# Patient Record
Sex: FEMALE | Race: WHITE | NOT HISPANIC OR LATINO | Employment: FULL TIME | URBAN - METROPOLITAN AREA
[De-identification: names, ages, dates, MRNs, and addresses within clinical notes are randomized per-mention and may not be internally consistent; named-entity substitution may affect disease eponyms.]

---

## 2017-04-17 ENCOUNTER — OFFICE VISIT (OUTPATIENT)
Dept: OTOLARYNGOLOGY | Facility: CLINIC | Age: 57
End: 2017-04-17
Payer: COMMERCIAL

## 2017-04-17 ENCOUNTER — OFFICE VISIT (OUTPATIENT)
Dept: AUDIOLOGY | Facility: CLINIC | Age: 57
End: 2017-04-17
Payer: COMMERCIAL

## 2017-04-17 VITALS — BODY MASS INDEX: 34.71 KG/M2 | RESPIRATION RATE: 18 BRPM | HEIGHT: 62 IN | WEIGHT: 188.6 LBS

## 2017-04-17 DIAGNOSIS — H90.2 CONDUCTIVE HEARING LOSS, UNILATERAL: ICD-10-CM

## 2017-04-17 DIAGNOSIS — H69.91 DYSFUNCTION OF EUSTACHIAN TUBE, RIGHT: ICD-10-CM

## 2017-04-17 DIAGNOSIS — H93.13 BILATERAL TINNITUS: ICD-10-CM

## 2017-04-17 DIAGNOSIS — Z98.890 HISTORY OF TYMPANOPLASTY OF RIGHT EAR: ICD-10-CM

## 2017-04-17 DIAGNOSIS — H93.13 TINNITUS, BILATERAL: Primary | ICD-10-CM

## 2017-04-17 DIAGNOSIS — H90.5 SNHL (SENSORINEURAL HEARING LOSS): ICD-10-CM

## 2017-04-17 PROBLEM — E78.00 HYPERCHOLESTEREMIA: Status: ACTIVE | Noted: 2017-04-17

## 2017-04-17 PROBLEM — M79.7 FIBROMYALGIA: Status: ACTIVE | Noted: 2017-04-17

## 2017-04-17 PROBLEM — I10 BENIGN ESSENTIAL HYPERTENSION: Status: ACTIVE | Noted: 2017-04-17

## 2017-04-17 PROCEDURE — 92567 TYMPANOMETRY: CPT | Performed by: AUDIOLOGIST

## 2017-04-17 PROCEDURE — 92557 COMPREHENSIVE HEARING TEST: CPT | Performed by: AUDIOLOGIST

## 2017-04-17 PROCEDURE — 99203 OFFICE O/P NEW LOW 30 MIN: CPT | Performed by: OTOLARYNGOLOGY

## 2017-04-17 RX ORDER — LISINOPRIL 40 MG/1
40 TABLET ORAL DAILY
COMMUNITY
End: 2017-12-09

## 2017-04-17 RX ORDER — MELOXICAM 15 MG/1
15 TABLET ORAL DAILY
COMMUNITY
End: 2017-12-09

## 2017-04-17 RX ORDER — TURMERIC 100 %
1 POWDER (GRAM) MISCELLANEOUS EVERY EVENING
COMMUNITY

## 2017-04-17 RX ORDER — IMIPRAMINE HCL 50 MG
150 TABLET ORAL AT BEDTIME
COMMUNITY
End: 2019-07-08

## 2017-04-17 RX ORDER — LEVOTHYROXINE SODIUM 112 UG/1
112 TABLET ORAL EVERY EVENING
COMMUNITY
End: 2019-07-03

## 2017-04-17 RX ORDER — PRAVASTATIN SODIUM 20 MG
20 TABLET ORAL DAILY
COMMUNITY
End: 2019-06-11 | Stop reason: DRUGHIGH

## 2017-04-17 RX ORDER — CETIRIZINE HYDROCHLORIDE 10 MG/1
10 TABLET ORAL DAILY
COMMUNITY
End: 2017-12-09

## 2017-04-17 RX ORDER — PREGABALIN 100 MG/1
100 CAPSULE ORAL 3 TIMES DAILY
COMMUNITY

## 2017-04-17 ASSESSMENT — PAIN SCALES - GENERAL: PAINLEVEL: NO PAIN (0)

## 2017-04-17 NOTE — MR AVS SNAPSHOT
"              After Visit Summary   2017    Britni Tavarez    MRN: [id number]           Patient Information     Date Of Birth          1960        Visit Information        Provider Department      2017 1:00 PM Alexa Orosco, [facility] [place]        Today's Diagnoses     Bilateral tinnitus        Dysfunction of eustachian tube, right        Conductive hearing loss, unilateral           Follow-ups after your visit        Who to contact     If you have questions or need follow up information about today's clinic visit or your schedule please contact [facility] directly at [phone].  Normal or non-critical lab and imaging results will be communicated to you by MyChart, letter or phone within 4 business days after the clinic has received the results. If you do not hear from us within 7 days, please contact the clinic through [employer] or phone. If you have a critical or abnormal lab result, we will notify you by phone as soon as possible.  Submit refill requests through [employer] or call your pharmacy and they will forward the refill request to us. Please allow 3 business days for your refill to be completed.          Additional Information About Your Visit        MyChart Information     [employer] lets you send messages to your doctor, view your test results, renew your prescriptions, schedule appointments and more. To sign up, go to [email or web address] . Click on \"Log in\" on the left side of the screen, which will take you to the Welcome page. Then click on \"Sign up Now\" on the right side of the page.     You will be asked to enter the access code listed below, as well as some personal information. Please follow the directions to create your username and password.     Your access code is: [email or web address]  Expires: 2017  1:54 PM     Your access code will  in 90 days. If you need help or a new code, please call your [facility] or [phone].      "   Care EveryWhere ID     This is your Care EveryWhere ID. This could be used by other organizations to access your [place] medical records  [id number]         Blood Pressure from Last 3 Encounters:   No data found for BP    Weight from Last 3 Encounters:   04/17/17 188 lb 9.6 oz (85.5 kg)              We Performed the Following     AUDIOGRAM/TYMPANOGRAM - INTERFACE     COMPREHENSIVE HEARING TEST     TYMPANOMETRY        Primary Care Provider Office Phone #    [facility] [phone]       No address on file        Thank you!     Thank you for choosing [facility]  for your care. Our goal is always to provide you with excellent care. Hearing back from our patients is one way we can continue to improve our services. Please take a few minutes to complete the written survey that you may receive in the mail after your visit with us. Thank you!             Your Updated Medication List - Protect others around you: Learn how to safely use, store and throw away your medicines at www.disposemymeds.org.          This list is accurate as of: 4/17/17  5:27 PM.  Always use your most recent med list.                   Brand Name Dispense Instructions for use    aspirin 81 MG tablet      Take by mouth daily       cetirizine 10 MG tablet    zyrTEC     Take 10 mg by mouth daily       cholecalciferol 5000 UNITS Caps capsule    vitamin D3     Take by mouth daily       Diclofenac Sodium & Capsaicin 1.5 & 0.025 % Thpk          imipramine 50 MG tablet    TOFRANIL     Take 50 mg by mouth At Bedtime       levothyroxine 112 MCG tablet    SYNTHROID/LEVOTHROID     Take 112 mcg by mouth daily       lisinopril 40 MG tablet    PRINIVIL/ZESTRIL     Take 40 mg by mouth daily       meloxicam 15 MG tablet    MOBIC     Take 15 mg by mouth daily       MULTIVITAMIN ADULT PO          pravastatin 20 MG tablet    PRAVACHOL     Take by mouth daily       pregabalin 100 MG capsule    LYRICA     Take by mouth 3 times daily        tiZANidine 4 MG tablet    ZANAFLEX     Take by mouth 3 times daily       Turmeric Root Powd

## 2017-04-17 NOTE — PATIENT INSTRUCTIONS
Scheduling Information  To schedule your CT/MRI scan, please contact [facility] at [phone] OR [facility] at [phone]    To schedule your Surgery, please contact our Specialty Schedulers at [phone]      ENT Clinic Locations Clinic Hours Telephone Number     [facility]  [address] [place] [address]   Monday:           1:00pm -- 5:00pm    Friday:              8:00am - 12:00pm   To schedule/reschedule an appointment with   Dr. Peterson,   please contact our   Specialty Scheduling Department at:     [phone]       [facility]  [address] [place] [place] [address] Tuesday:          8:00am -- 2:00pm         Urgent Care Locations Clinic Hours Telephone Numbers     [facility]  [address] [place] [address]     Monday-Friday:     11:00am - 9:00pm    Saturday-Sunday:  9:00am - 5:00pm   [phone]     [facility]  [address] [place] [address]     Monday-Friday:      5:00pm - 9:00pm     Saturday-Sunday:  9:00am - 5:00pm   [phone]

## 2017-04-17 NOTE — PROGRESS NOTES
History of Present Illness - Britni Tavarez (Jacki) is a 56 year old female who presents to me today for the first time for ear issues.  Specifically, the RIGHT ear is ringing.  She tells me that she had issues since childhood.  She had Greek Measles, and the RIGHT tympanic membrane broke down. She tells me that she has had three tympanoplasties on the RIGHT side.  Things were great for many years, barely any ear issues other than RIGHT hearing loss.    Then in the past Feburary 2017 she has started to have issues.  The RIGHT side feels like its draining.  There was no water exposure, no prodromal illness, no abnormal water exposure.    Past Medical History -   Patient Active Problem List   Diagnosis     SNHL (sensorineural hearing loss)     Tinnitus, bilateral     Benign essential hypertension     Hypercholesteremia     Fibromyalgia       Current Medications -   Current Outpatient Prescriptions:      aspirin 81 MG tablet, Take by mouth daily, Disp: , Rfl:      Diclofenac Sodium & Capsaicin 1.5 & 0.025 % THPK, , Disp: , Rfl:      imipramine (TOFRANIL) 50 MG tablet, Take 50 mg by mouth At Bedtime, Disp: , Rfl:      levothyroxine (SYNTHROID/LEVOTHROID) 112 MCG tablet, Take 112 mcg by mouth daily, Disp: , Rfl:      lisinopril (PRINIVIL/ZESTRIL) 40 MG tablet, Take 40 mg by mouth daily, Disp: , Rfl:      meloxicam (MOBIC) 15 MG tablet, Take 15 mg by mouth daily, Disp: , Rfl:      Multiple Vitamins-Minerals (MULTIVITAMIN ADULT PO), , Disp: , Rfl:      pravastatin (PRAVACHOL) 20 MG tablet, Take by mouth daily, Disp: , Rfl:      pregabalin (LYRICA) 100 MG capsule, Take by mouth 3 times daily, Disp: , Rfl:      tiZANidine (ZANAFLEX) 4 MG tablet, Take by mouth 3 times daily, Disp: , Rfl:      Turmeric, Curcuma Longa, (TURMERIC ROOT) POWD, , Disp: , Rfl:      cholecalciferol (VITAMIN D3) 5000 UNITS CAPS capsule, Take by mouth daily, Disp: , Rfl:      cetirizine (ZYRTEC) 10 MG tablet, Take 10 mg by mouth daily, Disp: ,  "Rfl:     Allergies -   Allergies   Allergen Reactions     Atorvastatin      Augmentin      Cortisone      Flagyl [Metronidazole]      Ibuprofen      Morphine      Penicillins      Prednisone      Propranolol      Simvastatin      Sulfa Drugs      Valtrex [Valacyclovir]        Social History -   Social History     Social History     Marital status:      Spouse name: N/A     Number of children: N/A     Years of education: N/A     Social History Main Topics     Smoking status: Not on file     Smokeless tobacco: Not on file     Alcohol use Not on file     Drug use: Not on file     Sexual activity: Not on file     Other Topics Concern     Not on file     Social History Narrative       Family History - No family history on file.    Review of Systems - As per HPI and PMHx, otherwise 7 system review of the head and neck negative.    Physical Exam  Resp 18  Ht 1.58 m (5' 2.21\")  Wt 85.5 kg (188 lb 9.6 oz)  BMI 34.27 kg/m2    General - The patient is well nourished and well developed, and appears to have good nutritional status.  Alert and oriented to person and place, answers questions and cooperates with examination appropriately.   Head and Face - Normocephalic and atraumatic, with no gross asymmetry noted of the contour of the facial features.  The facial nerve is intact, with strong symmetric movements.  Voice and Breathing - The patient was breathing comfortably without the use of accessory muscles. There was no wheezing, stridor, or stertor.  The patients voice was clear and strong, and had appropriate pitch and quality.  Ears - The LEFT ear tympanic membrane and canal are healthy and normal.  The RIGHT tympanic membrane is hevaily scarred, with a T Tube in place in the anterior superior qudrant, interstingly.  There is a large plug of dark material in the lumen.  Using a scope and pick, I was able to slowly tease this out and away.  Eyes - Extraocular movements intact, and the pupils were reactive to light.  " Sclera were not icteric or injected, conjunctiva were pink and moist.  Mouth - Examination of the oral cavity showed pink, healthy oral mucosa. No lesions or ulcerations noted.  The tongue was mobile and midline, and the dentition were in good condition.    Throat - The walls of the oropharynx were smooth, pink, moist, symmetric, and had no lesions or ulcerations.  The tonsillar pillars and soft palate were symmetric.  The uvula was midline on elevation.    Neck - Normal midline excursion of the laryngotracheal complex during swallowing.  Full range of motion on passive movement.  Palpation of the occipital, submental, submandibular, internal jugular chain, and supraclavicular nodes did not demonstrate any abnormal lymph nodes or masses.  The carotid pulse was palpable bilaterally.  Palpation of the thyroid was soft and smooth, with no nodules or goiter appreciated.  The trachea was mobile and midline.  Nose - External contour is symmetric, no gross deflection or scars.  Nasal mucosa is pink and moist with no abnormal mucus.  The septum was midline and non-obstructive, turbinates of normal size and position.  No polyps, masses, or purulence noted on examination.  Neurological - Cranial nerves 2 through 12 were grossly intact, and rapid alternating hand movements, and finger to nose with eyes closed were normal.    Audiologic Studies - An audiogram and tympanogram were performed today as part of the evaluation and personally reviewed. The tympanogram shows a normal Type A curve, with normal canal volume and middle ear pressure.  There is no sign of eustachian tube dysfunction or middle ear effusion.  The audiogram showed normal nerve lines bilaterally.  However, the RIGHT ear has a 20-30dB conductive hearing loss.      A/P - Britni Tavarez is a 56 year old female  (H93.13) Tinnitus, bilateral  (primary encounter diagnosis)  (H90.5) SNHL (sensorineural hearing loss)  (H69.81) Dysfunction of eustachian tube,  right  (Z98.788) History of tympanoplasty of right ear  Comment:A very interesting situation.  I suspect that jsut the weight of the debris that was on and in the T Tube is what was causing the shift in conductive hearing loss.  Now that it is cleared, the patient reports notable relief.    If everything normalizes, come and see me with a new audiogram annually.  Otherwise, return to clinic, and we may need a temporal bone CT.

## 2017-04-17 NOTE — NURSING NOTE
"Chief Complaint   Patient presents with     Tinnitus       Initial Resp 18  Ht 1.58 m (5' 2.21\")  Wt 85.5 kg (188 lb 9.6 oz)  BMI 34.27 kg/m2 Estimated body mass index is 34.27 kg/(m^2) as calculated from the following:    Height as of this encounter: 1.58 m (5' 2.21\").    Weight as of this encounter: 85.5 kg (188 lb 9.6 oz).  Medication Reconciliation: complete     Raven García MA    "

## 2017-04-17 NOTE — PROGRESS NOTES
AUDIOLOGY NOTE    SUBJECTIVE:  Britni Tavarez is a 56 year old female, was seen at [facility] and was referred for audiologic evaluation by ENT today.  The patient reports severe tinnitus bilaterally for the past 2 months and many years of right tinnitus prior with a permanent pressure equalization tube right only. The patient denies any other problems with the left ear but long standing hearing loss right.     OBJECTIVE:    Pure Tone Thresholds assessed using conventional audiometry with good reliability from 250-8000 Hz bilaterally using high frequency circumaural headphones revealed;     RIGHT:  Mild to moderate conductive hearing loss      LEFT:    Normal hearing   Please refer to scanned audiogram(s) for further details.     Speech Reception Threshold:    RIGHT: 25 dB HL    LEFT:   10 dB HL    Word Recognition Score:     RIGHT: 100% at 55 dB HL using NU-6  recorded word list.    LEFT:   96% at 45 dB HL using NU-6 recorded word list.    Tympanogram:     RIGHT: Type B - Flat     LEFT:   Type A     ASSESSMENT:   Eustachian Tube Dysfunction right   Conductive hearing loss right     Today s results were discussed with the patient in detail and a copy of the audiogram was provided upon request.    PLAN:  Britni was returned to ENT for follow up consult. Please call this clinic with questions regarding these results or recommendations.    Alexa Orosco M.S., F-AAA  Licensed Audiologist, [place] #[id number]

## 2017-04-17 NOTE — MR AVS SNAPSHOT
After Visit Summary   4/17/2017    Britni Tavarez    MRN: [id number]           Patient Information     Date Of Birth          1960        Visit Information        Provider Department      4/17/2017 1:30 PM Robert Peterson MD [facility]        Today's Diagnoses     Tinnitus, bilateral    -  1    SNHL (sensorineural hearing loss)        Dysfunction of eustachian tube, right        History of tympanoplasty of right ear          Care Instructions    Scheduling Information  To schedule your CT/MRI scan, please contact [facility] at [phone] OR [facility] at [phone]    To schedule your Surgery, please contact our Specialty Schedulers at [phone]      ENT Clinic Locations Clinic Hours Telephone Number     [facility]  [address] [place] [address]   Monday:           1:00pm -- 5:00pm    Friday:              8:00am - 12:00pm   To schedule/reschedule an appointment with   Dr. Peterson,   please contact our   Specialty Scheduling Department at:     [phone]       [facility]  [address] [place] [address] Tuesday:          8:00am -- 2:00pm         Urgent Care Locations Clinic Hours Telephone Numbers     [facility]  [address] [place] [address]     Monday-Friday:     11:00am - 9:00pm    Saturday-Sunday:  9:00am - 5:00pm   [phone]     [facility]  [address] [place] [address]     Monday-Friday:      5:00pm - 9:00pm     Saturday-Sunday:  9:00am - 5:00pm   [phone]               Follow-ups after your visit        Who to contact     If you have questions or need follow up information about today's clinic visit or your schedule please contact [facility] directly at [phone].  Normal or non-critical lab and imaging results will be communicated to you by MyChart, letter or phone within 4 business days after the clinic has received the results. If you do  "not hear from us within 7 days, please contact the clinic through [employer] or phone. If you have a critical or abnormal lab result, we will notify you by phone as soon as possible.  Submit refill requests through [employer] or call your pharmacy and they will forward the refill request to us. Please allow 3 business days for your refill to be completed.          Additional Information About Your Visit        [email or web address] Information     [employer] lets you send messages to your doctor, view your test results, renew your prescriptions, schedule appointments and more. To sign up, go to [email or web address] . Click on \"Log in\" on the left side of the screen, which will take you to the Welcome page. Then click on \"Sign up Now\" on the right side of the page.     You will be asked to enter the access code listed below, as well as some personal information. Please follow the directions to create your username and password.     Your access code is: [email or web address]  Expires: 2017  1:54 PM     Your access code will  in 90 days. If you need help or a new code, please call your [place] clinic or [phone].        Care EveryWhere ID     This is your Care EveryWhere ID. This could be used by other organizations to access your [place] medical records  [id number]        Your Vitals Were     Respirations Height BMI (Body Mass Index)             18 1.58 m (5' 2.21\") 34.27 kg/m2          Blood Pressure from Last 3 Encounters:   No data found for BP    Weight from Last 3 Encounters:   17 85.5 kg (188 lb 9.6 oz)              Today, you had the following     No orders found for display       Primary Care Provider Office Phone #    [facility] [phone]       No address on file        Thank you!     Thank you for choosing [facility]  for your care. Our goal is always to provide you with excellent care. Hearing back from our patients is one way we can continue to improve our services. Please take a " few minutes to complete the written survey that you may receive in the mail after your visit with us. Thank you!             Your Updated Medication List - Protect others around you: Learn how to safely use, store and throw away your medicines at www.disposemymeds.org.          This list is accurate as of: 4/17/17  1:54 PM.  Always use your most recent med list.                   Brand Name Dispense Instructions for use    aspirin 81 MG tablet      Take by mouth daily       cetirizine 10 MG tablet    zyrTEC     Take 10 mg by mouth daily       cholecalciferol 5000 UNITS Caps capsule    vitamin D3     Take by mouth daily       Diclofenac Sodium & Capsaicin 1.5 & 0.025 % Thpk          imipramine 50 MG tablet    TOFRANIL     Take 50 mg by mouth At Bedtime       levothyroxine 112 MCG tablet    SYNTHROID/LEVOTHROID     Take 112 mcg by mouth daily       lisinopril 40 MG tablet    PRINIVIL/ZESTRIL     Take 40 mg by mouth daily       meloxicam 15 MG tablet    MOBIC     Take 15 mg by mouth daily       MULTIVITAMIN ADULT PO          pravastatin 20 MG tablet    PRAVACHOL     Take by mouth daily       pregabalin 100 MG capsule    LYRICA     Take by mouth 3 times daily       tiZANidine 4 MG tablet    ZANAFLEX     Take by mouth 3 times daily       Turmeric Root Powd

## 2017-12-09 ENCOUNTER — HOSPITAL ENCOUNTER (EMERGENCY)
Facility: CLINIC | Age: 57
Discharge: HOME | End: 2017-12-09
Attending: FAMILY MEDICINE | Admitting: FAMILY MEDICINE
Payer: COMMERCIAL

## 2017-12-09 VITALS
WEIGHT: 180 LBS | RESPIRATION RATE: 16 BRPM | BODY MASS INDEX: 32.71 KG/M2 | SYSTOLIC BLOOD PRESSURE: 144 MMHG | HEART RATE: 78 BPM | TEMPERATURE: 98 F | OXYGEN SATURATION: 96 % | DIASTOLIC BLOOD PRESSURE: 86 MMHG

## 2017-12-09 DIAGNOSIS — M53.3 SI (SACROILIAC) JOINT DYSFUNCTION: ICD-10-CM

## 2017-12-09 DIAGNOSIS — G57.01 PIRIFORMIS SYNDROME, RIGHT: ICD-10-CM

## 2017-12-09 PROCEDURE — 25000131 ZZH RX MED GY IP 250 OP 636 PS 637: Performed by: FAMILY MEDICINE

## 2017-12-09 PROCEDURE — 99283 EMERGENCY DEPT VISIT LOW MDM: CPT | Performed by: FAMILY MEDICINE

## 2017-12-09 PROCEDURE — 99284 EMERGENCY DEPT VISIT MOD MDM: CPT | Mod: Z6 | Performed by: FAMILY MEDICINE

## 2017-12-09 RX ORDER — SULINDAC 200 MG/1
200 TABLET ORAL 2 TIMES DAILY WITH MEALS
Qty: 14 TABLET | Refills: 0 | Status: SHIPPED | OUTPATIENT
Start: 2017-12-09 | End: 2018-08-24

## 2017-12-09 RX ORDER — SODIUM PHOSPHATE,MONO-DIBASIC 19G-7G/118
1 ENEMA (ML) RECTAL 2 TIMES DAILY
COMMUNITY

## 2017-12-09 RX ORDER — DEXAMETHASONE 4 MG/1
8 TABLET ORAL ONCE
Status: DISCONTINUED | OUTPATIENT
Start: 2017-12-09 | End: 2017-12-09 | Stop reason: HOSPADM

## 2017-12-09 RX ORDER — CHLORAL HYDRATE 500 MG
1 CAPSULE ORAL EVERY EVENING
COMMUNITY

## 2017-12-09 RX ORDER — OXYCODONE AND ACETAMINOPHEN 5; 325 MG/1; MG/1
1 TABLET ORAL EVERY 8 HOURS PRN
Status: ON HOLD | COMMUNITY
End: 2019-07-29

## 2017-12-09 RX ADMIN — DEXAMETHASONE 10 MG: 2 TABLET ORAL at 13:08

## 2017-12-09 NOTE — ED AVS SNAPSHOT
[facility] Emergency Department    [address] [facility] [place] [address]    Phone:  [phone]    Fax:  [phone]                                       Britni Tavarez   MRN: [id number]    Department:  [facility] Emergency Department   Date of Visit:  12/9/2017           Patient Information     Date Of Birth          1960        Your diagnoses for this visit were:     SI (sacroiliac) joint dysfunction continue chiropracter care - SI joint maneuvers.  trial with TENS, voltaren gel.    Piriformis syndrome, right you were given decadron 10 mg orally - this will have onset in 6 hours and will last for 60-70 hours. ideally steroid injection for piriformis syndrome and possibly SI joint dysfunction may be helpful.  Take sulindac twice daily for the next 5-7 days.  return for numbness inner thighs, foot drop, new incontinence/retention urine/stool.       You were seen by Charlie Farfan MD.      Follow-up Information     Follow up with Clinic, [facility] In 1 week.    Contact information:    [address] [place] [address] [phone]          Follow up with [facility] Emergency Department.    Specialty:  EMERGENCY MEDICINE    Why:  As needed, If symptoms worsen    Contact information:    [address] [place] [id number] [phone]    Additional information:    The medical center is located at   [address] [place] [address]. (between [address] and   [address] in [place], [place] miles north   of [place]).        Discharge Instructions         ICD-10-CM    1. SI (sacroiliac) joint dysfunction M53.3     continue chiropracter care - SI joint maneuvers.  trial with TENS, voltaren gel.   2. Piriformis syndrome, right G57.01     you were given decadron 10 mg orally - this will have onset in 6 hours and will last for 60-70 hours. ideally steroid injection for piriformis syndrome and possibly SI joint dysfunction may be helpful.  Take sulindac twice daily for the next 5-7  days.  return for numbness inner thighs, foot drop, new incontinence/retention urine/stool.         24 Hour Appointment Hotline       To make an appointment at any [facility] clinic, call [phone] ([phone]). If you don't have a family doctor or clinic, we will help you find one. [facility] clinics are conveniently located to serve the needs of you and your family.             Review of your medicines      START taking        Dose / Directions Last dose taken    sulindac 200 MG tablet   Commonly known as:  CLINORIL   Dose:  200 mg   Quantity:  14 tablet        Take 1 tablet (200 mg) by mouth 2 times daily (with meals)   Refills:  0          Our records show that you are taking the medicines listed below. If these are incorrect, please call your family doctor or clinic.        Dose / Directions Last dose taken    AMLODIPINE BESYLATE PO   Dose:  10 mg        Take 10 mg by mouth daily   Refills:  0        aspirin 81 MG tablet        Take by mouth daily   Refills:  0        CRANBERRY PO        Take by mouth daily   Refills:  0        fish oil-omega-3 fatty acids 1000 MG capsule   Dose:  1 g        Take 1 g by mouth daily   Refills:  0        glucosamine-chondroitin 500-400 MG Caps per capsule   Dose:  1 capsule        Take 1 capsule by mouth daily   Refills:  0        imipramine 50 MG tablet   Commonly known as:  TOFRANIL   Dose:  150 mg        Take 150 mg by mouth At Bedtime   Refills:  0        levothyroxine 112 MCG tablet   Commonly known as:  SYNTHROID/LEVOTHROID   Dose:  112 mcg        Take 112 mcg by mouth daily   Refills:  0        MAGNESIUM OXIDE PO   Dose:  400 mg        Take 400 mg by mouth daily   Refills:  0        MULTIVITAMIN ADULT PO        Refills:  0        oxyCODONE-acetaminophen 5-325 MG per tablet   Commonly known as:  PERCOCET   Dose:  1 tablet        Take 1 tablet by mouth every 8 hours as needed for moderate to severe pain   Refills:  0        pravastatin 20 MG tablet   Commonly known as:   PRAVACHOL        Take by mouth daily   Refills:  0        pregabalin 100 MG capsule   Commonly known as:  LYRICA        Take by mouth 3 times daily   Refills:  0        tiZANidine 4 MG tablet   Commonly known as:  ZANAFLEX   Dose:  8 mg        Take 8 mg by mouth At Bedtime   Refills:  0        Turmeric Root Powd        Refills:  0        VITAMIN C PO   Dose:  500 mg        Take 500 mg by mouth daily   Refills:  0        VITAMIN D (CHOLECALCIFEROL) PO   Dose:  2000 Units        Take 2,000 Units by mouth daily   Refills:  0        XYZAL PO   Dose:  5 mg        Take 5 mg by mouth every evening   Refills:  0                Prescriptions were sent or printed at these locations (1 Prescription)                   [facility] [address] [place] [address] [address]    [address] [facility] [address]    Telephone:  [phone]   Fax:  [phone]   Hours:                  E-Prescribed (1 of 1)         sulindac (CLINORIL) 200 MG tablet                Orders Needing Specimen Collection     None      Pending Results     No orders found from 12/7/2017 to 12/10/2017.            Pending Culture Results     No orders found from 12/7/2017 to 12/10/2017.            Pending Results Instructions     If you had any lab results that were not finalized at the time of your Discharge, you can call the ED Lab Result RN at [phone]. You will be contacted by this team for any positive Lab results or changes in treatment. The nurses are available 7 days a week from 10A to 6:30P.  You can leave a message 24 hours per day and they will return your call.        Test Results From Your Hospital Stay               Thank you for choosing [facility]       Thank you for choosing [facility] for your care. Our goal is always to provide you with excellent care. Hearing back from our patients is one way we can continue to improve our services. Please take a few minutes to complete the written survey that you may receive in the mail after you  "visit with us. Thank you!        [employer] Information     [employer] lets you send messages to your doctor, view your test results, renew your prescriptions, schedule appointments and more. To sign up, go to [email or web address][email or web address] . Click on \"Log in\" on the left side of the screen, which will take you to the Welcome page. Then click on \"Sign up Now\" on the right side of the page.     You will be asked to enter the access code listed below, as well as some personal information. Please follow the directions to create your username and password.     Your access code is: [email or web address]  Expires: 3/9/2018 12:58 PM     Your access code will  in 90 days. If you need help or a new code, please call your [facility] clinic or [phone].        Care EveryWhere ID     This is your Care EveryWhere ID. This could be used by other organizations to access your [facility] medical records  [id number]        Equal Access to Services     ROSHAN PEREZ : Hadii chuck fong hadasho Soluis aali, waaxda luqadaha, qaybta kaalmada adeegyacornell, aliyah yan . So [facility] [phone].    ATENCIÓN: Si habla español, tiene a plunkett disposición servicios gratuitos de asistencia lingüística. Llame al [phone].    We comply with applicable federal civil rights laws and [place] laws. We do not discriminate on the basis of race, color, national origin, age, disability, sex, sexual orientation, or gender identity.            After Visit Summary       This is your record. Keep this with you and show to your community pharmacist(s) and doctor(s) at your next visit.                  "

## 2017-12-09 NOTE — DISCHARGE INSTRUCTIONS
ICD-10-CM    1. SI (sacroiliac) joint dysfunction M53.3     continue chiropracter care - SI joint maneuvers.  trial with TENS, voltaren gel.   2. Piriformis syndrome, right G57.01     you were given decadron 10 mg orally - this will have onset in 6 hours and will last for 60-70 hours. ideally steroid injection for piriformis syndrome and possibly SI joint dysfunction may be helpful.  Take sulindac twice daily for the next 5-7 days.  return for numbness inner thighs, foot drop, new incontinence/retention urine/stool.

## 2017-12-09 NOTE — ED PROVIDER NOTES
History     Chief Complaint   Patient presents with     Back Pain     low back pain since Wed night,      HPI  Britni Tavarez is a 57 year old female who history fibromyalgia and low back pain followed by [facility] pain management,  Dr. Nahun Singleton, Dr. alan Gallego - for ankle pain - s/p fusion, fibromyalgia - on chronic tizanidine, percocet, lyrica.      Now with low back pain  without recent injury - no fall, no recent heavy lifting.  right SI joint region - onset wednesday.  onset overnight while sitting.  associated with decreased range of motion.    pain radiates down the lateral right thigh - mid-way but not below the knee. Has been using percocet, accupuncture, chiropracter.    chronic stress incontinence and otherwise No symptoms suggestive of  cauda equina syndrome (central spinal stenosis) such as incontinence or retention of urine or stool, inner thigh numbness or foot drop.         Problem List:    Patient Active Problem List    Diagnosis Date Noted     SNHL (sensorineural hearing loss) 04/17/2017     Priority: Medium     Tinnitus, bilateral 04/17/2017     Priority: Medium     Benign essential hypertension 04/17/2017     Priority: Medium     Hypercholesteremia 04/17/2017     Priority: Medium     Fibromyalgia 04/17/2017     Priority: Medium     History of tympanoplasty of right ear 04/17/2017     Priority: Medium     Depression 10/13/2016     Priority: Medium     Midline low back pain without sciatica 06/09/2015     Priority: Medium     Spinal enthesopathy (H) 01/20/2015     Priority: Medium     Pain medication agreement 12/06/2011     Priority: Medium     Overview:   Signed 12/6/11 - OK for #75 Norco (Hydrocodone/Acetaminophen 5/325 mg) per month.  Referral to Pain Clinic made in July 2016, patient says she has an appointment October 2016. In 12/2016 Patient says she went to a pain clinic and they wanted her to come off her blood pressure meds and continue Norco (no record of that visit  have been received).  her  checked 3/10/2017 showing compliance with her agreement.  Last urine drug screen 9/2016 shows no unexpected findings.       Hypothyroidism (acquired) 12/13/2005     Priority: Medium     Migraine headache 12/13/2005     Priority: Medium     Overview:   Was on propranolol for prevention but caused falls  Also takes imipramine.  100 mg each night, consider decreasing as HA less frequent as of 12/2016  Neurology referral as of September 2016-didn't go because HA less frequent.       Osteopenia 12/13/2005     Priority: Medium     Overview:   Last DEXA in 2012 (stable), repeat in 2014--Declined in 2014.  Normal vitamin D level x 2.          Past Medical History:    No past medical history on file.    Past Surgical History:    No past surgical history on file.    Family History:    No family history on file.    Social History:  Marital Status:   [5]  Social History   Substance Use Topics     Smoking status: Current Every Day Smoker     Smokeless tobacco: Not on file     Alcohol use Not on file        Medications:      oxyCODONE-acetaminophen (PERCOCET) 5-325 MG per tablet   AMLODIPINE BESYLATE PO   Levocetirizine Dihydrochloride (XYZAL PO)   glucosamine-chondroitin 500-400 MG CAPS per capsule   CRANBERRY PO   Ascorbic Acid (VITAMIN C PO)   fish oil-omega-3 fatty acids 1000 MG capsule   VITAMIN D, CHOLECALCIFEROL, PO   MAGNESIUM OXIDE PO   aspirin 81 MG tablet   imipramine (TOFRANIL) 50 MG tablet   levothyroxine (SYNTHROID/LEVOTHROID) 112 MCG tablet   Multiple Vitamins-Minerals (MULTIVITAMIN ADULT PO)   pravastatin (PRAVACHOL) 20 MG tablet   pregabalin (LYRICA) 100 MG capsule   tiZANidine (ZANAFLEX) 4 MG tablet   Turmeric, Curcuma Longa, (TURMERIC ROOT) POWD         Review of Systems   Constitutional: Negative for chills, diaphoresis and fever.   HENT: Negative for ear pain, sinus pressure and sore throat.    Eyes: Negative for visual disturbance.   Respiratory: Negative for cough,  shortness of breath and wheezing.    Cardiovascular: Negative for chest pain and palpitations.   Gastrointestinal: Negative for abdominal pain, blood in stool, constipation, diarrhea, nausea and vomiting.   Genitourinary: Negative for dysuria, frequency and urgency.   Musculoskeletal: Positive for back pain.   Skin: Negative for rash.   Neurological: Negative for headaches.   All other systems reviewed and are negative.      Physical Exam   BP: 144/86  Pulse: 78  Temp: 98  F (36.7  C)  Resp: 16  Weight: 81.6 kg (180 lb)  SpO2: 96 %      Physical Exam     GENERAL: Alert and moderate distress in all directions  CARDIOVASCULAR: Peripheral pulses are palpable  GASTROINTESTINAL: No abdominal tenderness, mass or organomegaly.  No flank pain    MUSCULOSKELTAL:  Lumbosacral spine area reveals local tenderness at SI joint and sciatic notch.  painful and reduced range of motion in lateral and forward bending. .  Straight leg raise     Lying: Left (-) Right (-)  SI Joint Testing     functional leg length discrepancy on the RT, with the RT leg length increasing on moving from lying to sitting.    Other joints      Hips: full range of motion without pain.      Knees: full range of motion without pain.    NEURO:      Deep Tendon Reflexes: Present and symmetric      Motor strength: 5/5 throughout      Sensation to light touch intact        ED Course     ED Course     Procedures               Critical Care time:  none                   Assessments & Plan (with Medical Decision Making)     MDM: Britni Tavarez is a 57 year old female who presented with a history of chronic back pain, fibromyalgia, and followed by the [facility] also for chronic ankle pain status post prior ankle  fracture.  She is also followed by chiropractic care and they have been working with her on SI joint most recently.  Her current findings on exam and history suggest to diagnoses one is an SI joint dysfunction and the other is piriformis syndrome.   I recommended that she continue with the chiropractic care is manual medicine for the SI joint may be very beneficial.  We also discussed additional measures that are described below.  She has also the piriformis syndrome which could benefit from a localized steroid injection at [facility].  We discussed the potential for a steroid dose that could be given here.   single dose of Decadron here as this may offer benefit both at the SI and at the sciatic notch/piriformis.  We also discussed maintaining low back range of motion.  Local measures to the low back she already has a topical NSAID that she uses sometimes and I encouraged that as well as possibly using her TENS unit in this area.  We also discussed switching an NSAID out and using in place of ibuprofen or other NSAIDs sulindac for short period of time and this is written for her as well.  She is given precautions for return.  She has no red flag findings today.  No cauda equina symptoms or significant weakness or fever.      I have reviewed the nursing notes.    I have reviewed the findings, diagnosis, plan and need for follow up with the patient.       New Prescriptions    No medications on file       Final diagnoses:   SI (sacroiliac) joint dysfunction - continue chiropracter care - SI joint maneuvers.  trial with TENS, voltaren gel.   Piriformis syndrome, right - you were given decadron 10 mg orally - this will have onset in 6 hours and will last for 60-70 hours. ideally steroid injection for piriformis syndrome and possibly SI joint dysfunction may be helpful.  Take sulindac twice daily for the next 5-7 days.  return for numbness inner thighs, foot drop, new incontinence/retention urine/stool.       12/9/2017   [facility] EMERGENCY DEPARTMENT     Charlie Farfan MD  12/10/17 0861

## 2017-12-09 NOTE — ED AVS SNAPSHOT
[facility] Emergency Department    [address] [facility] [address]    Phone:  [phone]    Fax:  [phone]                                       Britni Tavarez   MRN: [id number]    Department:  [facility] Emergency Department   Date of Visit:  12/9/2017           After Visit Summary Signature Page     I have received my discharge instructions, and my questions have been answered. I have discussed any challenges I see with this plan with the nurse or doctor.    ..........................................................................................................................................  Patient/Patient Representative Signature      ..........................................................................................................................................  Patient Representative Print Name and Relationship to Patient    ..................................................               ................................................  Date                                            Time    ..........................................................................................................................................  Reviewed by Signature/Title    ...................................................              ..............................................  Date                                                            Time

## 2017-12-10 ASSESSMENT — ENCOUNTER SYMPTOMS
BLOOD IN STOOL: 0
SINUS PRESSURE: 0
CHILLS: 0
DIARRHEA: 0
COUGH: 0
SORE THROAT: 0
SHORTNESS OF BREATH: 0
FREQUENCY: 0
VOMITING: 0
ABDOMINAL PAIN: 0
FEVER: 0
CONSTIPATION: 0
DYSURIA: 0
HEADACHES: 0
WHEEZING: 0
DIAPHORESIS: 0
PALPITATIONS: 0
NAUSEA: 0
BACK PAIN: 1

## 2017-12-22 ENCOUNTER — HOSPITAL ENCOUNTER (EMERGENCY)
Facility: CLINIC | Age: 57
Discharge: HOME | End: 2017-12-22
Attending: NURSE PRACTITIONER | Admitting: NURSE PRACTITIONER
Payer: COMMERCIAL

## 2017-12-22 ENCOUNTER — APPOINTMENT (OUTPATIENT)
Dept: CT IMAGING | Facility: CLINIC | Age: 57
End: 2017-12-22
Attending: NURSE PRACTITIONER
Payer: COMMERCIAL

## 2017-12-22 VITALS
RESPIRATION RATE: 16 BRPM | TEMPERATURE: 97.9 F | OXYGEN SATURATION: 98 % | DIASTOLIC BLOOD PRESSURE: 84 MMHG | SYSTOLIC BLOOD PRESSURE: 135 MMHG

## 2017-12-22 DIAGNOSIS — J01.01 ACUTE RECURRENT MAXILLARY SINUSITIS: ICD-10-CM

## 2017-12-22 LAB
BASOPHILS # BLD AUTO: 0.1 10E9/L (ref 0–0.2)
BASOPHILS NFR BLD AUTO: 0.6 %
DIFFERENTIAL METHOD BLD: ABNORMAL
EOSINOPHIL # BLD AUTO: 0.2 10E9/L (ref 0–0.7)
EOSINOPHIL NFR BLD AUTO: 1.5 %
ERYTHROCYTE [DISTWIDTH] IN BLOOD BY AUTOMATED COUNT: 12.9 % (ref 10–15)
HCT VFR BLD AUTO: 42.5 % (ref 35–47)
HGB BLD-MCNC: 14.4 G/DL (ref 11.7–15.7)
IMM GRANULOCYTES # BLD: 0 10E9/L (ref 0–0.4)
IMM GRANULOCYTES NFR BLD: 0.2 %
LYMPHOCYTES # BLD AUTO: 3.5 10E9/L (ref 0.8–5.3)
LYMPHOCYTES NFR BLD AUTO: 28.6 %
MCH RBC QN AUTO: 30.2 PG (ref 26.5–33)
MCHC RBC AUTO-ENTMCNC: 33.9 G/DL (ref 31.5–36.5)
MCV RBC AUTO: 89 FL (ref 78–100)
MONOCYTES # BLD AUTO: 0.8 10E9/L (ref 0–1.3)
MONOCYTES NFR BLD AUTO: 6.8 %
NEUTROPHILS # BLD AUTO: 7.6 10E9/L (ref 1.6–8.3)
NEUTROPHILS NFR BLD AUTO: 62.3 %
PLATELET # BLD AUTO: 320 10E9/L (ref 150–450)
RBC # BLD AUTO: 4.77 10E12/L (ref 3.8–5.2)
WBC # BLD AUTO: 12.1 10E9/L (ref 4–11)

## 2017-12-22 PROCEDURE — 70486 CT MAXILLOFACIAL W/O DYE: CPT

## 2017-12-22 PROCEDURE — 85025 COMPLETE CBC W/AUTO DIFF WBC: CPT | Performed by: NURSE PRACTITIONER

## 2017-12-22 PROCEDURE — 99284 EMERGENCY DEPT VISIT MOD MDM: CPT | Mod: 25 | Performed by: NURSE PRACTITIONER

## 2017-12-22 PROCEDURE — 99284 EMERGENCY DEPT VISIT MOD MDM: CPT | Mod: Z6 | Performed by: NURSE PRACTITIONER

## 2017-12-22 RX ORDER — DOXYCYCLINE 100 MG/1
100 TABLET ORAL 2 TIMES DAILY
Qty: 20 TABLET | Refills: 0 | Status: SHIPPED | OUTPATIENT
Start: 2017-12-22 | End: 2018-01-01

## 2017-12-22 ASSESSMENT — ENCOUNTER SYMPTOMS
BACK PAIN: 0
FEVER: 0
HEADACHES: 1
CHILLS: 0
DYSURIA: 0
SORE THROAT: 1
SINUS PRESSURE: 1
SHORTNESS OF BREATH: 0
DIZZINESS: 1
COUGH: 1
WEAKNESS: 0
SINUS PAIN: 1
ABDOMINAL PAIN: 0
FATIGUE: 0
APPETITE CHANGE: 0
LIGHT-HEADEDNESS: 0

## 2017-12-22 NOTE — ED PROVIDER NOTES
History     Chief Complaint   Patient presents with     Sinusitis     Pt has had sinus congestion since September.  Ringing in ears, sore throat, pounding in throat, green drainage from eyes.  Illness is getting worse, not better.  Z-sirisha in Oct, Levaquin in Nov, recently finished steroid.      HPI  Britni Tavarez is a 57 year old female who presents to the emergency department for chronic recurrent sinusitis. Symptoms have been persistent sine September (4 months).  Patient has been treated with Zithromax in October and Levaquin in November.  Reports mild improvement with antibiotics but has not been completely resolved since September when this started. Increased sinus pressure and nasal congestion for the last couple weeks. Patient has history to T-tube to right TM x 3 and hearing loss in right ear. Evaluated by ENT this past year and told she may need a CT if persistent symptoms. Denies fever.     Problem List:    Patient Active Problem List    Diagnosis Date Noted     SNHL (sensorineural hearing loss) 04/17/2017     Priority: Medium     Tinnitus, bilateral 04/17/2017     Priority: Medium     Benign essential hypertension 04/17/2017     Priority: Medium     Hypercholesteremia 04/17/2017     Priority: Medium     Fibromyalgia 04/17/2017     Priority: Medium     History of tympanoplasty of right ear 04/17/2017     Priority: Medium     Depression 10/13/2016     Priority: Medium     Midline low back pain without sciatica 06/09/2015     Priority: Medium     Spinal enthesopathy (H) 01/20/2015     Priority: Medium     Pain medication agreement 12/06/2011     Priority: Medium     Overview:   Signed 12/6/11 - OK for #75 Norco (Hydrocodone/Acetaminophen 5/325 mg) per month.  Referral to Pain Clinic made in July 2016, patient says she has an appointment October 2016. In 12/2016 Patient says she went to a pain clinic and they wanted her to come off her blood pressure meds and continue Norco (no record of that visit  have been received).  her  checked 3/10/2017 showing compliance with her agreement.  Last urine drug screen 9/2016 shows no unexpected findings.       Hypothyroidism (acquired) 12/13/2005     Priority: Medium     Migraine headache 12/13/2005     Priority: Medium     Overview:   Was on propranolol for prevention but caused falls  Also takes imipramine.  100 mg each night, consider decreasing as HA less frequent as of 12/2016  Neurology referral as of September 2016-didn't go because HA less frequent.       Osteopenia 12/13/2005     Priority: Medium     Overview:   Last DEXA in 2012 (stable), repeat in 2014--Declined in 2014.  Normal vitamin D level x 2.          Past Medical History:    No past medical history on file.    Past Surgical History:    No past surgical history on file.    Family History:    No family history on file.    Social History:  Marital Status:   [5]  Social History   Substance Use Topics     Smoking status: Current Every Day Smoker     Smokeless tobacco: Not on file     Alcohol use Not on file        Medications:      doxycycline Monohydrate 100 MG TABS   oxyCODONE-acetaminophen (PERCOCET) 5-325 MG per tablet   AMLODIPINE BESYLATE PO   Levocetirizine Dihydrochloride (XYZAL PO)   glucosamine-chondroitin 500-400 MG CAPS per capsule   CRANBERRY PO   Ascorbic Acid (VITAMIN C PO)   fish oil-omega-3 fatty acids 1000 MG capsule   VITAMIN D, CHOLECALCIFEROL, PO   MAGNESIUM OXIDE PO   sulindac (CLINORIL) 200 MG tablet   aspirin 81 MG tablet   imipramine (TOFRANIL) 50 MG tablet   levothyroxine (SYNTHROID/LEVOTHROID) 112 MCG tablet   Multiple Vitamins-Minerals (MULTIVITAMIN ADULT PO)   pravastatin (PRAVACHOL) 20 MG tablet   pregabalin (LYRICA) 100 MG capsule   tiZANidine (ZANAFLEX) 4 MG tablet   Turmeric, Curcuma Longa, (TURMERIC ROOT) POWD         Review of Systems   Constitutional: Negative for appetite change, chills, fatigue and fever.   HENT: Positive for congestion, ear discharge, sinus  pain, sinus pressure and sore throat. Negative for ear pain and tinnitus.    Respiratory: Positive for cough. Negative for shortness of breath.    Cardiovascular: Negative for chest pain.   Gastrointestinal: Negative for abdominal pain.   Genitourinary: Negative for dysuria.   Musculoskeletal: Negative for back pain.   Skin: Negative for rash.   Neurological: Positive for dizziness (when bending forward) and headaches. Negative for weakness and light-headedness.         Physical Exam   BP: 135/84  Heart Rate: 67  Temp: 97.9  F (36.6  C)  Resp: 16  SpO2: 98 %      Physical Exam   Constitutional: She is oriented to person, place, and time. She appears well-developed and well-nourished. No distress.   HENT:   Head: Normocephalic and atraumatic.   Right Ear: External ear normal. Tympanic membrane is scarred and perforated (T-tube). Tympanic membrane is not injected, not erythematous and not bulging. A middle ear effusion is present.   Left Ear: External ear normal. Tympanic membrane is not injected, not erythematous and not bulging. A middle ear effusion is present.   Nose: Rhinorrhea present. Right sinus exhibits maxillary sinus tenderness and frontal sinus tenderness. Left sinus exhibits maxillary sinus tenderness and frontal sinus tenderness.       Mouth/Throat: Oropharynx is clear and moist. No oropharyngeal exudate.   Eyes: Conjunctivae and EOM are normal.   Neck: Normal range of motion. Neck supple.   Cardiovascular: Normal rate, regular rhythm and normal heart sounds.    No murmur heard.  Pulmonary/Chest: Effort normal and breath sounds normal. No respiratory distress. She has no wheezes. She has no rales.   Musculoskeletal: Normal range of motion.   Neurological: She is alert and oriented to person, place, and time.   Skin: Skin is warm and dry.       ED Course     ED Course     Procedures          Results for orders placed or performed during the hospital encounter of 12/22/17 (from the past 48 hour(s))   CBC  with platelets differential   Result Value Ref Range    WBC 12.1 (H) 4.0 - 11.0 10e9/L    RBC Count 4.77 3.8 - 5.2 10e12/L    Hemoglobin 14.4 11.7 - 15.7 g/dL    Hematocrit 42.5 35.0 - 47.0 %    MCV 89 78 - 100 fl    MCH 30.2 26.5 - 33.0 pg    MCHC 33.9 31.5 - 36.5 g/dL    RDW 12.9 10.0 - 15.0 %    Platelet Count 320 150 - 450 10e9/L    Diff Method Automated Method     % Neutrophils 62.3 %    % Lymphocytes 28.6 %    % Monocytes 6.8 %    % Eosinophils 1.5 %    % Basophils 0.6 %    % Immature Granulocytes 0.2 %    Absolute Neutrophil 7.6 1.6 - 8.3 10e9/L    Absolute Lymphocytes 3.5 0.8 - 5.3 10e9/L    Absolute Monocytes 0.8 0.0 - 1.3 10e9/L    Absolute Eosinophils 0.2 0.0 - 0.7 10e9/L    Absolute Basophils 0.1 0.0 - 0.2 10e9/L    Abs Immature Granulocytes 0.0 0 - 0.4 10e9/L   CT Maxillofacial w/o Contrast    Narrative    CT SCAN OF THE PARANASAL SINUSES AND FACE  12/22/2017 11:36 AM     HISTORY: Chronic sinusitis for three months. History of right  tympanostomy tube.    TECHNIQUE: Radiation dose for this scan was reduced using automated  exposure control, adjustment of the mA and/or kV according to patient  size, or iterative reconstruction technique.  Noncontrast axial scans  and coronal and sagittal reformations.       COMPARISON: None.    FINDINGS:   Frontal sinuses:   Normal.      Ethmoid sinuses:   Normal.     Right maxillary sinus:   Normal.  The ostiomeatal unit is normal with  a patent infundibulum.     Left maxillary sinus:   Normal.  The ostiomeatal unit is normal with a  patent infundibulum.      Sphenoid sinus:  Moderate mucosal thickening on the left with  obstructed left ostium. Mild mucosal thickening on the right with  patent right ostiomeatal.    Nasal septum:  Slightly deviated to the right.    Turbinates and nasal cavity:   Normal.     Laminae papyracea and cribriform plate: Normal.     Other findings: Orbits, sella, maxilla and nasopharynx appear normal.   Bony temporomandibular joints appear  normal.      Impression    IMPRESSION:  Mucosal thickening in the sphenoid sinus, left more than  right. Otherwise the sinuses are clear.    KEVEN ROPER MD       Assessments & Plan (with Medical Decision Making)     Britni Tavarez is a 57 year old female who presents to the emergency department for chronic recurrent sinusitis. Symptoms have been persistent sine September (4 months).  Patient has been treated with Zithromax in October and Levaquin in November.  Reports mild improvement with antibiotics but has not been completely resolved since September when this started. Increased sinus pressure and nasal congestion for the last couple weeks. Patient has history to T-tube to right TM x 3 and hearing loss in right ear. Evaluated by ENT this past year and told she may need a CT if persistent symptoms. Denies fever. On exam patient is afebrile, NAD. Rhinorrhea. Tenderness with palpation to the ethmoid and maxillary sinus region. Less tender over the frontal sinus. Right TM middle ear effusion, scarring, and T-tube.  No erythema, bulging or injection of the TM. Left TM middle ear effusion. Left TM is not erythematous or bulging. Given the ongoing symptoms and suggestion by patient and per ENT note from 4/2017 a CT of maxillofacial bones obtained and reveals mucosal thickening in the sphenoid sinus (left>right), otherwise no other significant findings. WBC is 12.1 today. I discussed results with patient. Recommend treating bacterial sinus infection with Doxycyline given not relieved by course of Azithromycin or Levaquin and patient is allergic to Augmentin.  Instructed to follow-up with ENT in 2 weeks for recheck of likely chronic sinusitis. Return for fever, headaches, vomiting or worse in any way.    I have reviewed the nursing notes.    I have reviewed the findings, diagnosis, plan and need for follow up with the patient.    New Prescriptions    DOXYCYCLINE MONOHYDRATE 100 MG TABS    Take 100 mg by mouth 2  times daily for 10 days       Final diagnoses:   Acute recurrent maxillary sinusitis       12/22/2017   [facility] EMERGENCY DEPARTMENT     Usha Goodman APRN CNP  12/22/17 [id number]

## 2017-12-22 NOTE — DISCHARGE INSTRUCTIONS
Discharge Instructions  Sinus Infection    You have acute sinusitis, or an infection of the sinuses. The sinuses are the hollow areas within the facial bones that are connected to the nasal opening. The most common cause of acute sinusitis is a virus infection associated with the common cold. Bacterial sinusitis occurs much less commonly, usually as a complication of viral sinusitis. Experts say that most sinusitis is caused by a virus within the first 7-10 days of illness. Antibiotics do nothing to help with virus infections, so most people do not need antibiotics for acute sinusitis.     Return to the Emergency Department if:    Your vision changes.    You are confused or have difficulty thinking clearly.    You have swelling around your eye.    You develop a severe headache or neck stiffness.    You have a fever over 101 degrees.    Your symptoms get worse and you are unable to see your primary doctor.    Follow-up with your doctor:     See your primary doctor in one week if not improving.    Treatment:    Pain relief -- Non-prescription pain medications, such as Tylenol  (acetaminophen) or Motrin  or Advil  (ibuprofen) are recommended for pain.  Do not use a medicine that you are allergic to, or if your doctor has told you not to use it.       Nasal irrigation -- Flushing the nose and sinuses with a saline solution several times per day can help to decrease pain caused by congestion.    Nasal decongestants -- Nasal decongestant sprays, including Afrin  (oxymetazoline) and Luis-Synephrine  (phenylephrine) can be used to temporarily treat congestion. However, these sprays should not be used for more than two to three days due to the risk of rebound congestion (when the nose is congested constantly unless the medication is used repeatedly).    Nasal glucocorticoids -- These are prescription steroids delivered by a nasal spray that can help to reduce swelling inside the nose, usually within two to three days. These  "drugs have few side effects and dramatically relieve symptoms in most people.  If you use these in conjunction with Afrin  you will need to use at least 15 minutes prior to the nasal decongestant.      Do I need an antibiotic? -- Sometimes, but not always, antibiotics are used along with the above treatments.    Antibiotic Warning:     If you have been placed on antibiotics - watch for signs of allergic reaction.  These include rash, lip swelling, difficulty breathing, wheezing, and dizziness.  If you develop any of these symptoms, stop the antibiotic immediately and go to an Emergency Department or Urgent Care for evaluation.    Probiotics: If you have been given an antibiotic, you may want to also take a probiotic pill or eat yogurt with live cultures. Probiotics have \"good bacteria\" to help your intestines stay healthy. Studies have shown that probiotics help prevent diarrhea and other intestine problems (including C. diff infection) when you take antibiotics. You can buy these without a prescription in the pharmacy section of the store.   If you were given a prescription for medicine here today, be sure to read all of the information (including the package insert) that comes with your prescription.  This will include important information about the medicine, its side effects, and any warnings that you need to know about.  The pharmacist who fills the prescription can provide more information and answer questions you may have about the medicine.  If you have questions or concerns that the pharmacist cannot address, please call or return to the Emergency Department.   Remember that you can always come back to the Emergency Department if you are not able to see your regular doctor in the amount of time listed above, if you get any new symptoms, or if there is anything that worries you.    "

## 2017-12-22 NOTE — ED AVS SNAPSHOT
[facility]    [address] [address]    Phone:  [phone]    Fax:  [phone]                                       Britni Tavarez   MRN: [id number]    Department:  [facility]   Date of Visit:  12/22/2017           After Visit Summary Signature Page     I have received my discharge instructions, and my questions have been answered. I have discussed any challenges I see with this plan with the nurse or doctor.    ..........................................................................................................................................  Patient/Patient Representative Signature      ..........................................................................................................................................  Patient Representative Print Name and Relationship to Patient    ..................................................               ................................................  Date                                            Time    ..........................................................................................................................................  Reviewed by Signature/Title    ...................................................              ..............................................  Date                                                            Time

## 2017-12-22 NOTE — ED AVS SNAPSHOT
[facility] Emergency Department    [address]    [facility] [place] [address]    Phone:  [phone]    Fax:  [phone]                                       Britni Tavarez   MRN: [id number]    Department:  [facility] Emergency Department   Date of Visit:  12/22/2017           Patient Information     Date Of Birth          1960        Your diagnoses for this visit were:     Acute recurrent maxillary sinusitis        You were seen by Usha Goodman, JOSE LAINEZ.      Follow-up Information     Follow up with [facility].    Why:  As needed    Contact information:    [address]  [place] [address]  [phone]          Discharge Instructions       Discharge Instructions  Sinus Infection    You have acute sinusitis, or an infection of the sinuses. The sinuses are the hollow areas within the facial bones that are connected to the nasal opening. The most common cause of acute sinusitis is a virus infection associated with the common cold. Bacterial sinusitis occurs much less commonly, usually as a complication of viral sinusitis. Experts say that most sinusitis is caused by a virus within the first 7-10 days of illness. Antibiotics do nothing to help with virus infections, so most people do not need antibiotics for acute sinusitis.     Return to the Emergency Department if:    Your vision changes.    You are confused or have difficulty thinking clearly.    You have swelling around your eye.    You develop a severe headache or neck stiffness.    You have a fever over 101 degrees.    Your symptoms get worse and you are unable to see your primary doctor.    Follow-up with your doctor:     See your primary doctor in one week if not improving.    Treatment:    Pain relief -- Non-prescription pain medications, such as Tylenol  (acetaminophen) or Motrin  or Advil  (ibuprofen) are recommended for pain.  Do not use a medicine that you are allergic to, or if your doctor has told  "you not to use it.       Nasal irrigation -- Flushing the nose and sinuses with a saline solution several times per day can help to decrease pain caused by congestion.    Nasal decongestants -- Nasal decongestant sprays, including Afrin  (oxymetazoline) and Luis-Synephrine  (phenylephrine) can be used to temporarily treat congestion. However, these sprays should not be used for more than two to three days due to the risk of rebound congestion (when the nose is congested constantly unless the medication is used repeatedly).    Nasal glucocorticoids -- These are prescription steroids delivered by a nasal spray that can help to reduce swelling inside the nose, usually within two to three days. These drugs have few side effects and dramatically relieve symptoms in most people.  If you use these in conjunction with Afrin  you will need to use at least 15 minutes prior to the nasal decongestant.      Do I need an antibiotic? -- Sometimes, but not always, antibiotics are used along with the above treatments.    Antibiotic Warning:     If you have been placed on antibiotics - watch for signs of allergic reaction.  These include rash, lip swelling, difficulty breathing, wheezing, and dizziness.  If you develop any of these symptoms, stop the antibiotic immediately and go to an Emergency Department or Urgent Care for evaluation.    Probiotics: If you have been given an antibiotic, you may want to also take a probiotic pill or eat yogurt with live cultures. Probiotics have \"good bacteria\" to help your intestines stay healthy. Studies have shown that probiotics help prevent diarrhea and other intestine problems (including C. diff infection) when you take antibiotics. You can buy these without a prescription in the pharmacy section of the store.   If you were given a prescription for medicine here today, be sure to read all of the information (including the package insert) that comes with your prescription.  This will include " important information about the medicine, its side effects, and any warnings that you need to know about.  The pharmacist who fills the prescription can provide more information and answer questions you may have about the medicine.  If you have questions or concerns that the pharmacist cannot address, please call or return to the Emergency Department.   Remember that you can always come back to the Emergency Department if you are not able to see your regular doctor in the amount of time listed above, if you get any new symptoms, or if there is anything that worries you.      24 Hour Appointment Hotline       To make an appointment at any [facility]3], call [phone] ([phone]). If you don't have a family doctor or clinic, we will help you find one. [facility] clinics are conveniently located to serve the needs of you and your family.             Review of your medicines      START taking        Dose / Directions Last dose taken    doxycycline Monohydrate 100 MG Tabs   Dose:  100 mg   Quantity:  20 tablet        Take 100 mg by mouth 2 times daily for 10 days   Refills:  0          Our records show that you are taking the medicines listed below. If these are incorrect, please call your family doctor or clinic.        Dose / Directions Last dose taken    AMLODIPINE BESYLATE PO   Dose:  10 mg        Take 10 mg by mouth daily   Refills:  0        aspirin 81 MG tablet        Take by mouth daily   Refills:  0        CRANBERRY PO        Take by mouth daily   Refills:  0        fish oil-omega-3 fatty acids 1000 MG capsule   Dose:  1 g        Take 1 g by mouth daily   Refills:  0        glucosamine-chondroitin 500-400 MG Caps per capsule   Dose:  1 capsule        Take 1 capsule by mouth daily   Refills:  0        imipramine 50 MG tablet   Commonly known as:  TOFRANIL   Dose:  150 mg        Take 150 mg by mouth At Bedtime   Refills:  0        levothyroxine 112 MCG tablet   Commonly known as:  SYNTHROID/LEVOTHROID    Dose:  112 mcg        Take 112 mcg by mouth daily   Refills:  0        MAGNESIUM OXIDE PO   Dose:  400 mg        Take 400 mg by mouth daily   Refills:  0        MULTIVITAMIN ADULT PO        Refills:  0        oxyCODONE-acetaminophen 5-325 MG per tablet   Commonly known as:  PERCOCET   Dose:  1 tablet        Take 1 tablet by mouth every 8 hours as needed for moderate to severe pain   Refills:  0        pravastatin 20 MG tablet   Commonly known as:  PRAVACHOL        Take by mouth daily   Refills:  0        pregabalin 100 MG capsule   Commonly known as:  LYRICA        Take by mouth 3 times daily   Refills:  0        sulindac 200 MG tablet   Commonly known as:  CLINORIL   Dose:  200 mg   Quantity:  14 tablet        Take 1 tablet (200 mg) by mouth 2 times daily (with meals)   Refills:  0        tiZANidine 4 MG tablet   Commonly known as:  ZANAFLEX   Dose:  8 mg        Take 8 mg by mouth At Bedtime   Refills:  0        Turmeric Root Powd        Refills:  0        VITAMIN C PO   Dose:  500 mg        Take 500 mg by mouth daily   Refills:  0        VITAMIN D (CHOLECALCIFEROL) PO   Dose:  2000 Units        Take 2,000 Units by mouth daily   Refills:  0        XYZAL PO   Dose:  5 mg        Take 5 mg by mouth every evening   Refills:  0                Prescriptions were sent or printed at these locations (1 Prescription)                   [facility] [address] [place], [place] [address] [address]   [address] [facility] [address]    Telephone:  [phone]   Fax:  [phone]   Hours:                  E-Prescribed (1 of 1)         doxycycline Monohydrate 100 MG TABS                Procedures and tests performed during your visit     CBC with platelets differential    CT Maxillofacial w/o Contrast      Orders Needing Specimen Collection     None      Pending Results     No orders found from 12/20/2017 to 12/23/2017.            Pending Culture Results     No orders found from 12/20/2017 to 12/23/2017.            Pending  Results Instructions     If you had any lab results that were not finalized at the time of your Discharge, you can call the ED Lab Result RN at [phone]. You will be contacted by this team for any positive Lab results or changes in treatment. The nurses are available 7 days a week from 10A to 6:30P.  You can leave a message 24 hours per day and they will return your call.        Test Results From Your Hospital Stay        12/22/2017 11:31 AM      Component Results     Component Value Ref Range & Units Status    WBC 12.1 (H) 4.0 - 11.0 10e9/L Final    RBC Count 4.77 3.8 - 5.2 10e12/L Final    Hemoglobin 14.4 11.7 - 15.7 g/dL Final    Hematocrit 42.5 35.0 - 47.0 % Final    MCV 89 78 - 100 fl Final    MCH 30.2 26.5 - 33.0 pg Final    MCHC 33.9 31.5 - 36.5 g/dL Final    RDW 12.9 10.0 - 15.0 % Final    Platelet Count 320 150 - 450 10e9/L Final    Diff Method Automated Method  Final    % Neutrophils 62.3 % Final    % Lymphocytes 28.6 % Final    % Monocytes 6.8 % Final    % Eosinophils 1.5 % Final    % Basophils 0.6 % Final    % Immature Granulocytes 0.2 % Final    Absolute Neutrophil 7.6 1.6 - 8.3 10e9/L Final    Absolute Lymphocytes 3.5 0.8 - 5.3 10e9/L Final    Absolute Monocytes 0.8 0.0 - 1.3 10e9/L Final    Absolute Eosinophils 0.2 0.0 - 0.7 10e9/L Final    Absolute Basophils 0.1 0.0 - 0.2 10e9/L Final    Abs Immature Granulocytes 0.0 0 - 0.4 10e9/L Final         12/22/2017 12:09 PM      Narrative     CT SCAN OF THE PARANASAL SINUSES AND FACE  12/22/2017 11:36 AM     HISTORY: Chronic sinusitis for three months. History of right  tympanostomy tube.    TECHNIQUE: Radiation dose for this scan was reduced using automated  exposure control, adjustment of the mA and/or kV according to patient  size, or iterative reconstruction technique.  Noncontrast axial scans  and coronal and sagittal reformations.       COMPARISON: None.    FINDINGS:   Frontal sinuses:   Normal.      Ethmoid sinuses:   Normal.     Right maxillary  "sinus:   Normal.  The ostiomeatal unit is normal with  a patent infundibulum.     Left maxillary sinus:   Normal.  The ostiomeatal unit is normal with a  patent infundibulum.      Sphenoid sinus:  Moderate mucosal thickening on the left with  obstructed left ostium. Mild mucosal thickening on the right with  patent right ostiomeatal.    Nasal septum:  Slightly deviated to the right.    Turbinates and nasal cavity:   Normal.     Laminae papyracea and cribriform plate: Normal.     Other findings: Orbits, sella, maxilla and nasopharynx appear normal.   Bony temporomandibular joints appear normal.        Impression     IMPRESSION:  Mucosal thickening in the sphenoid sinus, left more than  right. Otherwise the sinuses are clear.    KEVEN ROPER MD                Thank you for choosing [facility]       Thank you for choosing [facility] for your care. Our goal is always to provide you with excellent care. Hearing back from our patients is one way we can continue to improve our services. Please take a few minutes to complete the written survey that you may receive in the mail after you visit with us. Thank you!        [employer] Information     [employer] lets you send messages to your doctor, view your test results, renew your prescriptions, schedule appointments and more. To sign up, go to [email or web address] . Click on \"Log in\" on the left side of the screen, which will take you to the Welcome page. Then click on \"Sign up Now\" on the right side of the page.     You will be asked to enter the access code listed below, as well as some personal information. Please follow the directions to create your username and password.     Your access code is: [email or web address]  Expires: 3/9/2018 12:58 PM     Your access code will  in 90 days. If you need help or a new code, please call your [facility] clinic or [phone].        Care EveryWhere ID     This is your Care EveryWhere ID. This could be used by other organizations to access " your [facility] medical records  [id number]        Equal Access to Services     ROSHAN PEREZ : Anna Olmos, michela dhillon, aliyah guzman. So [facility] [phone].    ATENCIÓN: Si habla español, tiene a plunkett disposición servicios gratuitos de asistencia lingüística. Llame al [phone].    We comply with applicable federal civil rights laws and [place] laws. We do not discriminate on the basis of race, color, national origin, age, disability, sex, sexual orientation, or gender identity.            After Visit Summary       This is your record. Keep this with you and show to your community pharmacist(s) and doctor(s) at your next visit.

## 2018-05-06 ENCOUNTER — HOSPITAL ENCOUNTER (EMERGENCY)
Facility: CLINIC | Age: 58
Discharge: HOME | End: 2018-05-06
Attending: FAMILY MEDICINE | Admitting: FAMILY MEDICINE
Payer: COMMERCIAL

## 2018-05-06 VITALS
HEART RATE: 81 BPM | RESPIRATION RATE: 16 BRPM | DIASTOLIC BLOOD PRESSURE: 72 MMHG | SYSTOLIC BLOOD PRESSURE: 118 MMHG | BODY MASS INDEX: 33.61 KG/M2 | OXYGEN SATURATION: 96 % | WEIGHT: 185 LBS | TEMPERATURE: 98 F

## 2018-05-06 DIAGNOSIS — L03.119 CELLULITIS OF LOWER EXTREMITY, UNSPECIFIED LATERALITY: ICD-10-CM

## 2018-05-06 PROCEDURE — 25000132 ZZH RX MED GY IP 250 OP 250 PS 637: Performed by: FAMILY MEDICINE

## 2018-05-06 PROCEDURE — 99283 EMERGENCY DEPT VISIT LOW MDM: CPT | Mod: Z6 | Performed by: FAMILY MEDICINE

## 2018-05-06 PROCEDURE — 99283 EMERGENCY DEPT VISIT LOW MDM: CPT

## 2018-05-06 RX ORDER — CEPHALEXIN 500 MG/1
1000 CAPSULE ORAL ONCE
Status: COMPLETED | OUTPATIENT
Start: 2018-05-06 | End: 2018-05-06

## 2018-05-06 RX ORDER — CEPHALEXIN 500 MG/1
500 CAPSULE ORAL 4 TIMES DAILY
Qty: 40 CAPSULE | Refills: 0 | Status: SHIPPED | OUTPATIENT
Start: 2018-05-06 | End: 2018-05-16

## 2018-05-06 RX ADMIN — CEPHALEXIN 1000 MG: 500 CAPSULE ORAL at 14:28

## 2018-05-06 NOTE — ED AVS SNAPSHOT
[facility] Emergency Department    [address] [address]    Phone:  [phone]    Fax:  [phone]                                       Britni Tavarez   MRN: [id number]    Department:  [facility] Emergency Department   Date of Visit:  5/6/2018           Patient Information     Date Of Birth          1960        Your diagnoses for this visit were:     Cellulitis of lower extremity, unspecified laterality        You were seen by Greg Odonnell MD and Aníbal Ramires MD.      Follow-up Information     Schedule an appointment as soon as possible for a visit with [facility].    Why:  As needed, If symptoms worsen    Contact information:    [address]  [place] [address]  [phone]          Discharge Instructions         Discharge Instructions for Cellulitis  You have been diagnosed with cellulitis. This is an infection in the deepest layer of the skin. In some cases, the infection also affects the muscle. Cellulitis is caused by bacteria. The bacteria can enter the body through broken skin. This can happen with a cut, scratch, animal bite, or an insect bite that has been scratched. You may have been treated in the hospital with antibiotics and fluids. You will likely be given a prescription for antibiotics to take at home. This sheet will help you take care of yourself at home.  Home care  When you are home:    Take the prescribed antibiotic medicine you are given as directed until it is gone. Take it even if you feel better. It treats the infection and stops it from returning. Not taking all the medicine can make future infections hard to treat.    Keep the infected area clean.    When possible, raise the infected area above the level of your heart. This helps keep swelling down.    Talk with your healthcare provider if you are in pain. Ask what kind of over-the-counter medicine you can take for pain.    Apply clean bandages as  advised.    Take your temperature once a day for a week.    Wash your hands often to prevent spreading the infection.  In the future, wash your hands before and after you touch cuts, scratches, or bandages. This will help prevent infection.   When to call your healthcare provider  Call your healthcare provider immediately if you have any of the following:    Difficulty or pain when moving the joints above or below the infected area    Discharge or pus draining from the area    Fever of 100.4 F (38 C) or higher, or as directed by your healthcare provider    Pain that gets worse in or around the infected     Redness that gets worse in or around the infected area, particularly if the area of redness expands to a wider area    Shaking chills    Swelling of the infected area    Vomiting   Date Last Reviewed: 8/1/2016 2000-2017 [employer]. [address] [place] [address]. All rights reserved. This information is not intended as a substitute for professional medical care. Always follow your healthcare professional's instructions.    Leg elevation, rest.  Keflex 500 mg 4 times daily ×10 days.  If not clearly improving over the next 48-72 hours please follow-up in urgent care or with your primary care provider.  Return to the emergency department if worse or changes.      24 Hour Appointment Hotline       To make an appointment at any [facility], call [phone] ([phone]). If you don't have a family doctor or clinic, we will help you find one. [facility] clinics are conveniently located to serve the needs of you and your family.             Review of your medicines      START taking        Dose / Directions Last dose taken    cephALEXin 500 MG capsule   Commonly known as:  KEFLEX   Dose:  500 mg   Quantity:  40 capsule        Take 1 capsule (500 mg) by mouth 4 times daily for 10 days   Refills:  0          Our records show that you are taking the medicines listed below. If these are  incorrect, please call your family doctor or clinic.        Dose / Directions Last dose taken    AMLODIPINE BESYLATE PO   Dose:  10 mg        Take 10 mg by mouth daily   Refills:  0        aspirin 81 MG tablet        Take by mouth daily   Refills:  0        CRANBERRY PO        Take by mouth daily   Refills:  0        fish oil-omega-3 fatty acids 1000 MG capsule   Dose:  1 g        Take 1 g by mouth daily   Refills:  0        glucosamine-chondroitin 500-400 MG Caps per capsule   Dose:  1 capsule        Take 1 capsule by mouth daily   Refills:  0        imipramine 50 MG tablet   Commonly known as:  TOFRANIL   Dose:  150 mg        Take 150 mg by mouth At Bedtime   Refills:  0        levothyroxine 112 MCG tablet   Commonly known as:  SYNTHROID/LEVOTHROID   Dose:  112 mcg        Take 112 mcg by mouth daily   Refills:  0        MAGNESIUM OXIDE PO   Dose:  400 mg        Take 400 mg by mouth daily   Refills:  0        MULTIVITAMIN ADULT PO        Refills:  0        oxyCODONE-acetaminophen 5-325 MG per tablet   Commonly known as:  PERCOCET   Dose:  1 tablet        Take 1 tablet by mouth every 8 hours as needed for moderate to severe pain   Refills:  0        pravastatin 20 MG tablet   Commonly known as:  PRAVACHOL        Take by mouth daily   Refills:  0        pregabalin 100 MG capsule   Commonly known as:  LYRICA        Take by mouth 3 times daily   Refills:  0        sulindac 200 MG tablet   Commonly known as:  CLINORIL   Dose:  200 mg   Quantity:  14 tablet        Take 1 tablet (200 mg) by mouth 2 times daily (with meals)   Refills:  0        tiZANidine 4 MG tablet   Commonly known as:  ZANAFLEX   Dose:  8 mg        Take 8 mg by mouth At Bedtime   Refills:  0        Turmeric Root Powd        Refills:  0        VITAMIN C PO   Dose:  500 mg        Take 500 mg by mouth daily   Refills:  0        VITAMIN D (CHOLECALCIFEROL) PO   Dose:  2000 Units        Take 2,000 Units by mouth daily   Refills:  0        XYZAL PO   Dose:  5  "mg        Take 5 mg by mouth every evening   Refills:  0                Prescriptions were sent or printed at these locations (1 Prescription)                   [facility] JOCELYNE JAMES - [address]   [address] [place] [address]    Telephone:  [phone]   Fax:  [phone]   Hours:                  E-Prescribed (1 of 1)         cephALEXin (KEFLEX) 500 MG capsule                Orders Needing Specimen Collection     None      Pending Results     No orders found from 5/4/2018 to 5/7/2018.            Pending Culture Results     No orders found from 5/4/2018 to 5/7/2018.            Pending Results Instructions     If you had any lab results that were not finalized at the time of your Discharge, you can call the ED Lab Result RN at [phone]. You will be contacted by this team for any positive Lab results or changes in treatment. The nurses are available 7 days a week from 10A to 6:30P.  You can leave a message 24 hours per day and they will return your call.        Test Results From Your Hospital Stay               Thank you for choosing [facility]       Thank you for choosing [facility] for your care. Our goal is always to provide you with excellent care. Hearing back from our patients is one way we can continue to improve our services. Please take a few minutes to complete the written survey that you may receive in the mail after you visit with us. Thank you!        [employer] Information     [employer] lets you send messages to your doctor, view your test results, renew your prescriptions, schedule appointments and more. To sign up, go to [email or web address] . Click on \"Log in\" on the left side of the screen, which will take you to the Welcome page. Then click on \"Sign up Now\" on the right side of the page.     You will be asked to enter the access code listed below, as well as some personal information. Please follow the directions to create your username and password.     Your access code " is: [email or web address]  Expires: 2018  2:44 PM     Your access code will  in 90 days. If you need help or a new code, please call your [facility] clinic or [phone].        Care EveryWhere ID     This is your Care EveryWhere ID. This could be used by other organizations to access your [facility] medical records  [id number]        Equal Access to Services     ROSHAN PEREZ : Hadii chuck herrerao Soluis aali, waaxda luqadaha, qaybta kaalmada adeegyada, aliyah yan . So [facility] [phone].    ATENCIÓN: Si habla supa, tiene a plunkett disposición servicios gratuitos de asistencia lingüística. Llame al [phone].    We comply with applicable federal civil rights laws and [place] laws. We do not discriminate on the basis of race, color, national origin, age, disability, sex, sexual orientation, or gender identity.            After Visit Summary       This is your record. Keep this with you and show to your community pharmacist(s) and doctor(s) at your next visit.

## 2018-05-06 NOTE — ED PROVIDER NOTES
History     Chief Complaint   Patient presents with     Cellulitis     hx of LE cellulitis, bilat cellulitis 2 days      HPI  Britni Tavarez is a 57 year old female, past medical history reviewed as below, presents to the emergency department with approximately 2 days history of what appears to be increasing bilateral lower extremity rash associated with warmth and mild tenderness.  The patient notes that she did break the skin on the left anterior shin area couple of days ago when she had overtly dropped something on her leg.  No such trauma to the right leg.  Denied systemic features such as fever chills or sweats.  No nausea or vomiting.    Problem List:    Patient Active Problem List    Diagnosis Date Noted     SNHL (sensorineural hearing loss) 04/17/2017     Priority: Medium     Tinnitus, bilateral 04/17/2017     Priority: Medium     Benign essential hypertension 04/17/2017     Priority: Medium     Hypercholesteremia 04/17/2017     Priority: Medium     Fibromyalgia 04/17/2017     Priority: Medium     History of tympanoplasty of right ear 04/17/2017     Priority: Medium     Depression 10/13/2016     Priority: Medium     Midline low back pain without sciatica 06/09/2015     Priority: Medium     Spinal enthesopathy (H) 01/20/2015     Priority: Medium     Pain medication agreement 12/06/2011     Priority: Medium     Overview:   Signed 12/6/11 - OK for #75 Norco (Hydrocodone/Acetaminophen 5/325 mg) per month.  Referral to Pain Clinic made in July 2016, patient says she has an appointment October 2016. In 12/2016 Patient says she went to a pain clinic and they wanted her to come off her blood pressure meds and continue Norco (no record of that visit have been received).  her  checked 3/10/2017 showing compliance with her agreement.  Last urine drug screen 9/2016 shows no unexpected findings.       Hypothyroidism (acquired) 12/13/2005     Priority: Medium     Migraine headache 12/13/2005     Priority:  Medium     Overview:   Was on propranolol for prevention but caused falls  Also takes imipramine.  100 mg each night, consider decreasing as HA less frequent as of 12/2016  Neurology referral as of September 2016-didn't go because HA less frequent.       Osteopenia 12/13/2005     Priority: Medium     Overview:   Last DEXA in 2012 (stable), repeat in 2014--Declined in 2014.  Normal vitamin D level x 2.          Past Medical History:    No past medical history on file.    Past Surgical History:    No past surgical history on file.    Family History:    No family history on file.    Social History:  Marital Status:   [5]  Social History   Substance Use Topics     Smoking status: Current Every Day Smoker     Smokeless tobacco: Not on file     Alcohol use Not on file        Medications:      cephALEXin (KEFLEX) 500 MG capsule   AMLODIPINE BESYLATE PO   Ascorbic Acid (VITAMIN C PO)   aspirin 81 MG tablet   CRANBERRY PO   fish oil-omega-3 fatty acids 1000 MG capsule   glucosamine-chondroitin 500-400 MG CAPS per capsule   imipramine (TOFRANIL) 50 MG tablet   Levocetirizine Dihydrochloride (XYZAL PO)   levothyroxine (SYNTHROID/LEVOTHROID) 112 MCG tablet   MAGNESIUM OXIDE PO   Multiple Vitamins-Minerals (MULTIVITAMIN ADULT PO)   oxyCODONE-acetaminophen (PERCOCET) 5-325 MG per tablet   pravastatin (PRAVACHOL) 20 MG tablet   pregabalin (LYRICA) 100 MG capsule   sulindac (CLINORIL) 200 MG tablet   tiZANidine (ZANAFLEX) 4 MG tablet   Turmeric, Curcuma Longa, (TURMERIC ROOT) POWD   VITAMIN D, CHOLECALCIFEROL, PO         Review of Systems   All other systems reviewed and are negative.      Physical Exam   BP: 125/63  Pulse: 81  Temp: 98  F (36.7  C)  Resp: 16  Weight: 83.9 kg (185 lb)  SpO2: 94 %      Physical Exam   Musculoskeletal:   Bilateral faint erythema, warmth and subtle swelling in a patchy distribution of the bilateral lower extremities.  There are numerous breaks in skin bilaterally to suggest portal of entry for  skin organisms.   Nursing note and vitals reviewed.      ED Course     ED Course     Procedures               Critical Care time:  none               No results found for this or any previous visit (from the past 24 hour(s)).    Medications   cephALEXin (KEFLEX) capsule 1,000 mg (not administered)       Assessments & Plan (with Medical Decision Making)   57-year-old female past medical history reviewed as above, presents the emergency department with concerns of erythematous warm rash bilateral lower extremities.  2 days duration.  Physical exam is consistent with cellulitis.  I plan to treat the patient with oral Keflex and discussed this with her.  She should note some improvement after about 48-72 hours.  Return if worse otherwise to follow-up in clinic with her primary care provider.      Disclaimer: This note consists of symbols derived from keyboarding, dictation and/or voice recognition software. As a result, there may be errors in the script that have gone undetected. Please consider this when interpreting information found in this chart.      I have reviewed the nursing notes.    I have reviewed the findings, diagnosis, plan and need for follow up with the patient.            New Prescriptions    CEPHALEXIN (KEFLEX) 500 MG CAPSULE    Take 1 capsule (500 mg) by mouth 4 times daily for 10 days       Final diagnoses:   Cellulitis of lower extremity, unspecified laterality       5/6/2018   [facility] EMERGENCY DEPARTMENT     Aníbal Ramires MD  05/06/18 [id number]

## 2018-05-06 NOTE — ED AVS SNAPSHOT
[facility] Emergency Department    [address] [address]    Phone:  [phone]    Fax:  [phone]                                       Britni Tavarez   MRN: [id number]    Department:  [facility] Emergency Department   Date of Visit:  5/6/2018           After Visit Summary Signature Page     I have received my discharge instructions, and my questions have been answered. I have discussed any challenges I see with this plan with the nurse or doctor.    ..........................................................................................................................................  Patient/Patient Representative Signature      ..........................................................................................................................................  Patient Representative Print Name and Relationship to Patient    ..................................................               ................................................  Date                                            Time    ..........................................................................................................................................  Reviewed by Signature/Title    ...................................................              ..............................................  Date                                                            Time

## 2018-05-06 NOTE — DISCHARGE INSTRUCTIONS
Discharge Instructions for Cellulitis  You have been diagnosed with cellulitis. This is an infection in the deepest layer of the skin. In some cases, the infection also affects the muscle. Cellulitis is caused by bacteria. The bacteria can enter the body through broken skin. This can happen with a cut, scratch, animal bite, or an insect bite that has been scratched. You may have been treated in the hospital with antibiotics and fluids. You will likely be given a prescription for antibiotics to take at home. This sheet will help you take care of yourself at home.  Home care  When you are home:    Take the prescribed antibiotic medicine you are given as directed until it is gone. Take it even if you feel better. It treats the infection and stops it from returning. Not taking all the medicine can make future infections hard to treat.    Keep the infected area clean.    When possible, raise the infected area above the level of your heart. This helps keep swelling down.    Talk with your healthcare provider if you are in pain. Ask what kind of over-the-counter medicine you can take for pain.    Apply clean bandages as advised.    Take your temperature once a day for a week.    Wash your hands often to prevent spreading the infection.  In the future, wash your hands before and after you touch cuts, scratches, or bandages. This will help prevent infection.   When to call your healthcare provider  Call your healthcare provider immediately if you have any of the following:    Difficulty or pain when moving the joints above or below the infected area    Discharge or pus draining from the area    Fever of 100.4 F (38 C) or higher, or as directed by your healthcare provider    Pain that gets worse in or around the infected     Redness that gets worse in or around the infected area, particularly if the area of redness expands to a wider area    Shaking chills    Swelling of the infected area    Vomiting   Date Last Reviewed:  8/1/2016 2000-2017 [employer]. [address] [place] [address], [place] [address]. All rights reserved. This information is not intended as a substitute for professional medical care. Always follow your healthcare professional's instructions.    Leg elevation, rest.  Keflex 500 mg 4 times daily ×10 days.  If not clearly improving over the next 48-72 hours please follow-up in urgent care or with your primary care provider.  Return to the emergency department if worse or changes.

## 2018-08-24 ENCOUNTER — HOSPITAL ENCOUNTER (EMERGENCY)
Facility: CLINIC | Age: 58
Discharge: HOME | End: 2018-08-24
Attending: PHYSICIAN ASSISTANT | Admitting: PHYSICIAN ASSISTANT
Payer: COMMERCIAL

## 2018-08-24 ENCOUNTER — APPOINTMENT (OUTPATIENT)
Dept: ULTRASOUND IMAGING | Facility: CLINIC | Age: 58
End: 2018-08-24
Attending: PHYSICIAN ASSISTANT
Payer: COMMERCIAL

## 2018-08-24 VITALS
OXYGEN SATURATION: 100 % | WEIGHT: 175 LBS | SYSTOLIC BLOOD PRESSURE: 160 MMHG | DIASTOLIC BLOOD PRESSURE: 94 MMHG | RESPIRATION RATE: 16 BRPM | TEMPERATURE: 98.2 F | HEART RATE: 74 BPM | BODY MASS INDEX: 31.8 KG/M2

## 2018-08-24 DIAGNOSIS — L03.115 CELLULITIS OF RIGHT LOWER EXTREMITY: ICD-10-CM

## 2018-08-24 PROCEDURE — 93971 EXTREMITY STUDY: CPT | Mod: RT

## 2018-08-24 PROCEDURE — 99213 OFFICE O/P EST LOW 20 MIN: CPT | Mod: Z6 | Performed by: PHYSICIAN ASSISTANT

## 2018-08-24 PROCEDURE — G0463 HOSPITAL OUTPT CLINIC VISIT: HCPCS | Mod: 25 | Performed by: PHYSICIAN ASSISTANT

## 2018-08-24 RX ORDER — OXYCODONE AND ACETAMINOPHEN 10; 325 MG/1; MG/1
1 TABLET ORAL EVERY 6 HOURS PRN
COMMUNITY
End: 2019-06-11

## 2018-08-24 RX ORDER — CEPHALEXIN 500 MG/1
500 CAPSULE ORAL 4 TIMES DAILY
Qty: 28 CAPSULE | Refills: 0 | Status: SHIPPED | OUTPATIENT
Start: 2018-08-24 | End: 2018-08-31

## 2018-08-24 NOTE — ED PROVIDER NOTES
History     Chief Complaint   Patient presents with     Ankle Pain     hx of ankle fx, now with redness and swelling, hx of cellulitis.      HPI  Britni Tavarez is a 58 year old female who presents to urgent care with concern for possible cellulitis.  Patient has chronic history of ongoing pain.  She she had a fracture of her right ankle approximately 20 years ago which required open reduction internal fixation.  Over the last several months she has also had increased throbbing sensation in the leg which was thought to be secondary to low back radiculopathy.  She had surgery on her back on the 24 of July, however she is unsure which procedure was done.  She states that since surgery he has had persistent swelling which she is unsure is worse in the last 24-48 hours.  However, in the last 24 hours she has developed erythema, increasing pain of the right ankle.  She states that symptoms feel similar to when she has been diagnosed with cellulitis previously.  She has not attempted any OTC treatments directly for her symptoms however this on a regular Percocet and states she has prescription to  extended release tablets which she has not yet started.      Problem List:    Patient Active Problem List    Diagnosis Date Noted     SNHL (sensorineural hearing loss) 04/17/2017     Priority: Medium     Tinnitus, bilateral 04/17/2017     Priority: Medium     Benign essential hypertension 04/17/2017     Priority: Medium     Hypercholesteremia 04/17/2017     Priority: Medium     Fibromyalgia 04/17/2017     Priority: Medium     History of tympanoplasty of right ear 04/17/2017     Priority: Medium     Depression 10/13/2016     Priority: Medium     Midline low back pain without sciatica 06/09/2015     Priority: Medium     Spinal enthesopathy (H) 01/20/2015     Priority: Medium     Pain medication agreement 12/06/2011     Priority: Medium     Overview:   Signed 12/6/11 - OK for #75 Norco (Hydrocodone/Acetaminophen  5/325 mg) per month.  Referral to Pain Clinic made in July 2016, patient says she has an appointment October 2016. In 12/2016 Patient says she went to a pain clinic and they wanted her to come off her blood pressure meds and continue Norco (no record of that visit have been received).  her  checked 3/10/2017 showing compliance with her agreement.  Last urine drug screen 9/2016 shows no unexpected findings.       Hypothyroidism (acquired) 12/13/2005     Priority: Medium     Migraine headache 12/13/2005     Priority: Medium     Overview:   Was on propranolol for prevention but caused falls  Also takes imipramine.  100 mg each night, consider decreasing as HA less frequent as of 12/2016  Neurology referral as of September 2016-didn't go because HA less frequent.       Osteopenia 12/13/2005     Priority: Medium     Overview:   Last DEXA in 2012 (stable), repeat in 2014--Declined in 2014.  Normal vitamin D level x 2.          Past Medical History:    History reviewed. No pertinent past medical history.    Past Surgical History:    History reviewed. No pertinent surgical history.    Family History:    No family history on file.    Social History:  Marital Status:   [5]  Social History   Substance Use Topics     Smoking status: Current Every Day Smoker     Smokeless tobacco: Never Used     Alcohol use Not on file      Medications:      HYDRALAZINE-HCTZ PO   oxyCODONE-acetaminophen (PERCOCET)  MG per tablet   AMLODIPINE BESYLATE PO   Ascorbic Acid (VITAMIN C PO)   CRANBERRY PO   fish oil-omega-3 fatty acids 1000 MG capsule   glucosamine-chondroitin 500-400 MG CAPS per capsule   imipramine (TOFRANIL) 50 MG tablet   Levocetirizine Dihydrochloride (XYZAL PO)   levothyroxine (SYNTHROID/LEVOTHROID) 112 MCG tablet   MAGNESIUM OXIDE PO   Multiple Vitamins-Minerals (MULTIVITAMIN ADULT PO)   oxyCODONE-acetaminophen (PERCOCET) 5-325 MG per tablet   pravastatin (PRAVACHOL) 20 MG tablet   pregabalin (LYRICA) 100 MG  capsule   tiZANidine (ZANAFLEX) 4 MG tablet   Turmeric, Curcuma Longa, (TURMERIC ROOT) POWD   VITAMIN D, CHOLECALCIFEROL, PO     Review of Systems  CONSTITUTIONAL:NEGATIVE for fever, chills, change in weight  INTEGUMENTARY/SKIN: POSITIVE for erythema, right lower leg   RESP:NEGATIVE for significant cough or SOB  MUSCULOSKELETAL: POSITIVE  for right lower leg pain and swelling and NEGATIVE for other new concerning onset arthralgias or myalgias   NEURO: POSITIVE for ongoing paresthesias right leg NEGATIVE for numbness   Physical Exam   BP: (!) 160/94  Pulse: 74  Temp: 98.2  F (36.8  C)  Resp: 16  Weight: 79.4 kg (175 lb)  SpO2: 100 %  Physical Exam   Constitutional: She is oriented to person, place, and time. She appears well-developed and well-nourished. No distress.   Cardiovascular: Normal rate, regular rhythm and normal heart sounds.  Exam reveals no gallop and no friction rub.    No murmur heard.  Pulses:       Posterior tibial pulses are 2+ on the right side   Musculoskeletal:        Right ankle: She exhibits decreased range of motion (secondary to history of fusion) and swelling. She exhibits no deformity, no laceration and normal pulse. Tenderness.        Right lower leg: She exhibits tenderness, swelling and edema. She exhibits no bony tenderness, no deformity and no laceration.        Legs:  Neurological: She is alert and oriented to person, place, and time. No sensory deficit.   Skin: No abrasion, no ecchymosis, no laceration and no rash noted. There is erythema.     ED Course     ED Course     Procedures        Critical Care time:  none        Results for orders placed or performed during the hospital encounter of 08/24/18   US Lower Extremity Venous Duplex Right    Narrative    VENOUS ULTRASOUND RIGHT LEG  8/24/2018 3:24 PM     HISTORY: right lower extremity swelling since history of back surgery  one month ago;     COMPARISON: None.    FINDINGS:  Examination of the deep veins with graded compression  and  color flow Doppler with spectral wave form analysis was performed.      Impression    IMPRESSION: No evidence of deep venous thrombosis.    THIEN ARVIZU MD     Medications - No data to display  Assessments & Plan (with Medical Decision Making)     I have reviewed the nursing notes.  I have reviewed the findings, diagnosis, plan and need for follow up with the patient.     Discharge Medication List as of 8/24/2018  3:27 PM      START taking these medications    Details   cephALEXin (KEFLEX) 500 MG capsule Take 1 capsule (500 mg) by mouth 4 times daily for 7 days, Disp-28 capsule, R-0, E-Prescribe           Final diagnoses:   Cellulitis of right lower extremity     A 8-year-old female presents to urgent care with concern over possible cellulitis after noticing ongoing swelling of her right lower extremity with increasing pain, erythema the last 48 hours.  She had stable vital signs upon arrival.  Physical exam findings as described above.  As part of evaluation patient did have ultrasound of her right lower leg which was negative for acute DVT.  Her symptoms are most consistent with cellulitis.  I do not suspect septic arthritis at this time.  She was discharged home stable with prescription for Keflex 500 mg 4 times daily for 7 days which she has responded to well previously.  Symptomatic treatment with rest, heat, Tylenol/ibuprofen if tolerated.  May use home opioid prescriptions.  Follow up with PCP if no improvement in 48-72 hours.  Worrisome reasons to return to ER/UC sooner discussed.      Disclaimer: This note consists of symbols derived from keyboarding, dictation, and/or voice recognition software. As a result, there may be errors in the script that have gone undetected.  Please consider this when interpreting information found in the chart.    8/24/2018   [facility] EMERGENCY DEPARTMENT     Peri Benavides PA-C  08/29/18 121

## 2018-08-24 NOTE — ED AVS SNAPSHOT
[facility]    [address] [address]    Phone:  [phone]    Fax:  [phone]                                       Britni Tavarez   MRN: [id number]    Department:  [facility]   Date of Visit:  8/24/2018           Patient Information     Date Of Birth          1960        Your diagnoses for this visit were:     Cellulitis of right lower extremity        You were seen by Peri Benavides PA-C.      Follow-up Information     Follow up with [facility], [facility] In 3 days.    Why:  if no improvement or sooner if new or worsening symptoms     Contact information:    [address] [facility]  [place] [address]  [phone]          Discharge Instructions         Discharge Instructions for Cellulitis  You have been diagnosed with cellulitis. This is an infection in the deepest layer of the skin. In some cases, the infection also affects the muscle. Cellulitis is caused by bacteria. The bacteria can enter the body through broken skin. This can happen with a cut, scratch, animal bite, or an insect bite that has been scratched. You may have been treated in the hospital with antibiotics and fluids. You will likely be given a prescription for antibiotics to take at home. This sheet will help you take care of yourself at home.  Home care  When you are home:    Take the prescribed antibiotic medicine you are given as directed until it is gone. Take it even if you feel better. It treats the infection and stops it from returning. Not taking all the medicine can make future infections hard to treat.    Keep the infected area clean.    When possible, raise the infected area above the level of your heart. This helps keep swelling down.    Talk with your healthcare provider if you are in pain. Ask what kind of over-the-counter medicine you can take for pain.    Apply clean bandages as advised.    Take your temperature once a day for a week.    Wash  your hands often to prevent spreading the infection.  In the future, wash your hands before and after you touch cuts, scratches, or bandages. This will help prevent infection.   When to call your healthcare provider  Call your healthcare provider immediately if you have any of the following:    Difficulty or pain when moving the joints above or below the infected area    Discharge or pus draining from the area    Fever of 100.4 F (38 C) or higher, or as directed by your healthcare provider    Pain that gets worse in or around the infected     Redness that gets worse in or around the infected area, particularly if the area of redness expands to a wider area    Shaking chills    Swelling of the infected area    Vomiting   Date Last Reviewed: 8/1/2016 2000-2017 [employer]. [address] [place] [address], [place] [address]. All rights reserved. This information is not intended as a substitute for professional medical care. Always follow your healthcare professional's instructions.          24 Hour Appointment Hotline       To make an appointment at any [facility], call [phone]). If you don't have a family doctor or clinic, we will help you find one. [facility] are conveniently located to serve the needs of you and your family.             Review of your medicines      START taking        Dose / Directions Last dose taken    cephALEXin 500 MG capsule   Commonly known as:  KEFLEX   Dose:  500 mg   Quantity:  28 capsule        Take 1 capsule (500 mg) by mouth 4 times daily for 7 days   Refills:  0          Our records show that you are taking the medicines listed below. If these are incorrect, please call your family doctor or clinic.        Dose / Directions Last dose taken    AMLODIPINE BESYLATE PO   Dose:  10 mg        Take 10 mg by mouth daily   Refills:  0        CRANBERRY PO        Take by mouth daily   Refills:  0        fish oil-omega-3 fatty acids 1000 MG capsule   Dose:   1 g        Take 1 g by mouth daily   Refills:  0        glucosamine-chondroitin 500-400 MG Caps per capsule   Dose:  1 capsule        Take 1 capsule by mouth daily   Refills:  0        HYDRALAZINE-HCTZ PO   Dose:  25 mg        Take 25 mg by mouth   Refills:  0        imipramine 50 MG tablet   Commonly known as:  TOFRANIL   Dose:  150 mg        Take 150 mg by mouth At Bedtime   Refills:  0        levothyroxine 112 MCG tablet   Commonly known as:  SYNTHROID/LEVOTHROID   Dose:  112 mcg        Take 112 mcg by mouth daily   Refills:  0        MAGNESIUM OXIDE PO   Dose:  400 mg        Take 400 mg by mouth daily   Refills:  0        MULTIVITAMIN ADULT PO        Refills:  0        * oxyCODONE-acetaminophen 5-325 MG per tablet   Commonly known as:  PERCOCET   Dose:  1 tablet        Take 1 tablet by mouth every 8 hours as needed for moderate to severe pain   Refills:  0        * oxyCODONE-acetaminophen  MG per tablet   Commonly known as:  PERCOCET   Dose:  1 tablet        Take 1 tablet by mouth every 6 hours as needed for severe pain   Refills:  0        pravastatin 20 MG tablet   Commonly known as:  PRAVACHOL        Take by mouth daily   Refills:  0        pregabalin 100 MG capsule   Commonly known as:  LYRICA        Take by mouth 3 times daily   Refills:  0        tiZANidine 4 MG tablet   Commonly known as:  ZANAFLEX   Dose:  8 mg        Take 8 mg by mouth At Bedtime   Refills:  0        Turmeric Root Powd        Refills:  0        VITAMIN C PO   Dose:  500 mg        Take 500 mg by mouth daily   Refills:  0        VITAMIN D (CHOLECALCIFEROL) PO   Dose:  2000 Units        Take 2,000 Units by mouth daily   Refills:  0        XYZAL PO   Dose:  5 mg        Take 5 mg by mouth every evening   Refills:  0        * Notice:  This list has 2 medication(s) that are the same as other medications prescribed for you. Read the directions carefully, and ask your doctor or other care provider to review them with you.             Information about OPIOIDS     PRESCRIPTION OPIOIDS: WHAT YOU NEED TO KNOW   We gave you an opioid (narcotic) pain medicine. It is important to manage your pain, but opioids are not always the best choice. You should first try all the other options your care team gave you. Take this medicine for as short a time (and as few doses) as possible.    Some activities can increase your pain, such as bandage changes or therapy sessions. It may help to take your pain medicine 30 to 60 minutes before these activities. Reduce your stress by getting enough sleep, working on hobbies you enjoy and practicing relaxation or meditation. Talk to your care team about ways to manage your pain beyond prescription opioids.    These medicines have risks:    DO NOT drive when on new or higher doses of pain medicine. These medicines can affect your alertness and reaction times, and you could be arrested for driving under the influence (DUI). If you need to use opioids long-term, talk to your care team about driving.    DO NOT operate heavy machinery    DO NOT do any other dangerous activities while taking these medicines.    DO NOT drink any alcohol while taking these medicines.     If the opioid prescribed includes acetaminophen, DO NOT take with any other medicines that contain acetaminophen. Read all labels carefully. Look for the word  acetaminophen  or  Tylenol.  Ask your pharmacist if you have questions or are unsure.    You can get addicted to pain medicines, especially if you have a history of addiction (chemical, alcohol or substance dependence). Talk to your care team about ways to reduce this risk.    All opioids tend to cause constipation. Drink plenty of water and eat foods that have a lot of fiber, such as fruits, vegetables, prune juice, apple juice and high-fiber cereal. Take a laxative (Miralax, milk of magnesia, Colace, Senna) if you don t move your bowels at least every other day. Other side effects include upset  stomach, sleepiness, dizziness, throwing up, tolerance (needing more of the medicine to have the same effect), physical dependence and slowed breathing.    Store your pills in a secure place, locked if possible. We will not replace any lost or stolen medicine. If you don t finish your medicine, please throw away (dispose) as directed by your pharmacist. The [place] [employer] has more information about safe disposal: https://www.pca.state.[place]/living-green/managing-unwanted-medications        Prescriptions were sent or printed at these locations (1 Prescription)                   [facility] [address] [place] [address] [address] [facility]   [address] [facility] [address]    Telephone:  [phone]   Fax:  [phone]   Hours:                  E-Prescribed (1 of 1)         cephALEXin (KEFLEX) 500 MG capsule                Procedures and tests performed during your visit     US Lower Extremity Venous Duplex Right      Orders Needing Specimen Collection     None      Pending Results     No orders found from 8/22/2018 to 8/25/2018.            Pending Culture Results     No orders found from 8/22/2018 to 8/25/2018.            Pending Results Instructions     If you had any lab results that were not finalized at the time of your Discharge, you can call the ED Lab Result RN at [phone]. You will be contacted by this team for any positive Lab results or changes in treatment. The nurses are available 7 days a week from 10A to 6:30P.  You can leave a message 24 hours per day and they will return your call.        Test Results From Your Hospital Stay        8/24/2018  3:25 PM      Narrative     VENOUS ULTRASOUND RIGHT LEG  8/24/2018 3:24 PM     HISTORY: right lower extremity swelling since history of back surgery  one month ago;     COMPARISON: None.    FINDINGS:  Examination of the deep veins with graded compression and  color flow Doppler with spectral wave form analysis was performed.        Impression  "    IMPRESSION: No evidence of deep venous thrombosis.    THIEN ARVIZU MD                Thank you for choosing [facility]       Thank you for choosing [facility] for your care. Our goal is always to provide you with excellent care. Hearing back from our patients is one way we can continue to improve our services. Please take a few minutes to complete the written survey that you may receive in the mail after you visit with us. Thank you!        [employer] Information     [employer] lets you send messages to your doctor, view your test results, renew your prescriptions, schedule appointments and more. To sign up, go to [email or web address] . Click on \"Log in\" on the left side of the screen, which will take you to the Welcome page. Then click on \"Sign up Now\" on the right side of the page.     You will be asked to enter the access code listed below, as well as some personal information. Please follow the directions to create your username and password.     Your access code is: [email or web address]  Expires: 2018  3:27 PM     Your access code will  in 90 days. If you need help or a new code, please call your [facility] clinic or [phone].        Care EveryWhere ID     This is your Care EveryWhere ID. This could be used by other organizations to access your [facility] medical records  [id number]        Equal Access to Services     ROSHAN PEREZ : Hadii chuck herrerao Sojackson, waaxda luqadaha, qaybta kaalmada adeegyacornell, aliyah yan . So [facility] [phone].    ATENCIÓN: Si habla español, tiene a plunkett disposición servicios gratuitos de asistencia lingüística. Llame al [phone].    We comply with applicable federal civil rights laws and [place] laws. We do not discriminate on the basis of race, color, national origin, age, disability, sex, sexual orientation, or gender identity.            After Visit Summary       This is your record. Keep this with you and show to your community " pharmacist(s) and doctor(s) at your next visit.

## 2018-08-24 NOTE — DISCHARGE INSTRUCTIONS
Discharge Instructions for Cellulitis  You have been diagnosed with cellulitis. This is an infection in the deepest layer of the skin. In some cases, the infection also affects the muscle. Cellulitis is caused by bacteria. The bacteria can enter the body through broken skin. This can happen with a cut, scratch, animal bite, or an insect bite that has been scratched. You may have been treated in the hospital with antibiotics and fluids. You will likely be given a prescription for antibiotics to take at home. This sheet will help you take care of yourself at home.  Home care  When you are home:    Take the prescribed antibiotic medicine you are given as directed until it is gone. Take it even if you feel better. It treats the infection and stops it from returning. Not taking all the medicine can make future infections hard to treat.    Keep the infected area clean.    When possible, raise the infected area above the level of your heart. This helps keep swelling down.    Talk with your healthcare provider if you are in pain. Ask what kind of over-the-counter medicine you can take for pain.    Apply clean bandages as advised.    Take your temperature once a day for a week.    Wash your hands often to prevent spreading the infection.  In the future, wash your hands before and after you touch cuts, scratches, or bandages. This will help prevent infection.   When to call your healthcare provider  Call your healthcare provider immediately if you have any of the following:    Difficulty or pain when moving the joints above or below the infected area    Discharge or pus draining from the area    Fever of 100.4 F (38 C) or higher, or as directed by your healthcare provider    Pain that gets worse in or around the infected     Redness that gets worse in or around the infected area, particularly if the area of redness expands to a wider area    Shaking chills    Swelling of the infected area    Vomiting   Date Last Reviewed:  8/1/2016 2000-2017 [employer]. [address] [place] [address], [place] [address]. All rights reserved. This information is not intended as a substitute for professional medical care. Always follow your healthcare professional's instructions.

## 2018-08-24 NOTE — ED AVS SNAPSHOT
[facility]    [address] [facility] [address]    Phone:  [phone]    Fax:  [phone]                                       Britni Tavarez   MRN: [id number]    Department:  [facility]   Date of Visit:  8/24/2018           After Visit Summary Signature Page     I have received my discharge instructions, and my questions have been answered. I have discussed any challenges I see with this plan with the nurse or doctor.    ..........................................................................................................................................  Patient/Patient Representative Signature      ..........................................................................................................................................  Patient Representative Print Name and Relationship to Patient    ..................................................               ................................................  Date                                            Time    ..........................................................................................................................................  Reviewed by Signature/Title    ...................................................              ..............................................  Date                                                            Time          22EPIC Rev 08/18

## 2018-08-24 NOTE — ED TRIAGE NOTES
Patient here for pain in the R ankle - swelling for the past few months - has seen her primary and had a CT done, redness started last evening.  Patient presents ambulatory to the urgent care.

## 2019-03-29 ENCOUNTER — TRANSFERRED RECORDS (OUTPATIENT)
Dept: HEALTH INFORMATION MANAGEMENT | Facility: CLINIC | Age: 59
End: 2019-03-29

## 2019-03-29 LAB
ALT SERPL-CCNC: 15 U/L (ref 7–52)
AST SERPL-CCNC: 15 U/L (ref 8–43)
CREAT SERPL-MCNC: 0.79 MG/DL (ref 0.6–1.2)
GFR SERPL CREATININE-BSD FRML MDRD: 83.1 ML/MIN/1.73M2
GLUCOSE SERPL-MCNC: 93 MG/DL (ref 74–126)
POTASSIUM SERPL-SCNC: 3.3 MEQL/L (ref 3.5–5.1)
TSH SERPL-ACNC: 4.03 UIU/ML (ref 0.3–4.2)

## 2019-05-22 ENCOUNTER — TRANSFERRED RECORDS (OUTPATIENT)
Dept: HEALTH INFORMATION MANAGEMENT | Facility: CLINIC | Age: 59
End: 2019-05-22

## 2019-05-24 ENCOUNTER — TRANSFERRED RECORDS (OUTPATIENT)
Dept: HEALTH INFORMATION MANAGEMENT | Facility: CLINIC | Age: 59
End: 2019-05-24

## 2019-06-06 ENCOUNTER — OFFICE VISIT (OUTPATIENT)
Dept: FAMILY MEDICINE | Facility: CLINIC | Age: 59
End: 2019-06-06
Payer: COMMERCIAL

## 2019-06-06 VITALS
OXYGEN SATURATION: 97 % | WEIGHT: 202.9 LBS | SYSTOLIC BLOOD PRESSURE: 132 MMHG | TEMPERATURE: 97.4 F | HEIGHT: 62 IN | DIASTOLIC BLOOD PRESSURE: 74 MMHG | HEART RATE: 74 BPM | BODY MASS INDEX: 37.34 KG/M2 | RESPIRATION RATE: 16 BRPM

## 2019-06-06 DIAGNOSIS — E87.6 HYPOKALEMIA: ICD-10-CM

## 2019-06-06 DIAGNOSIS — Z12.11 SPECIAL SCREENING FOR MALIGNANT NEOPLASMS, COLON: ICD-10-CM

## 2019-06-06 DIAGNOSIS — Z01.818 PREOP GENERAL PHYSICAL EXAM: Primary | ICD-10-CM

## 2019-06-06 DIAGNOSIS — M79.671 RIGHT FOOT PAIN: ICD-10-CM

## 2019-06-06 DIAGNOSIS — I10 BENIGN ESSENTIAL HYPERTENSION: ICD-10-CM

## 2019-06-06 DIAGNOSIS — E03.9 HYPOTHYROIDISM, UNSPECIFIED TYPE: ICD-10-CM

## 2019-06-06 PROBLEM — J32.9 RECURRENT SINUS INFECTIONS: Status: ACTIVE | Noted: 2019-06-06

## 2019-06-06 LAB
ALBUMIN SERPL-MCNC: 3.9 G/DL (ref 3.4–5)
ALP SERPL-CCNC: 98 U/L (ref 40–150)
ALT SERPL W P-5'-P-CCNC: 19 U/L (ref 0–50)
ANION GAP SERPL CALCULATED.3IONS-SCNC: 6 MMOL/L (ref 3–14)
AST SERPL W P-5'-P-CCNC: 22 U/L (ref 0–45)
BILIRUB SERPL-MCNC: 0.2 MG/DL (ref 0.2–1.3)
BUN SERPL-MCNC: 13 MG/DL (ref 7–30)
CALCIUM SERPL-MCNC: 10.1 MG/DL (ref 8.5–10.1)
CHLORIDE SERPL-SCNC: 99 MMOL/L (ref 94–109)
CO2 SERPL-SCNC: 33 MMOL/L (ref 20–32)
CREAT SERPL-MCNC: 0.66 MG/DL (ref 0.52–1.04)
ERYTHROCYTE [DISTWIDTH] IN BLOOD BY AUTOMATED COUNT: 13.7 % (ref 10–15)
GFR SERPL CREATININE-BSD FRML MDRD: >90 ML/MIN/{1.73_M2}
GLUCOSE SERPL-MCNC: 106 MG/DL (ref 70–99)
HCT VFR BLD AUTO: 48.7 % (ref 35–47)
HGB BLD-MCNC: 16.4 G/DL (ref 11.7–15.7)
MCH RBC QN AUTO: 30.4 PG (ref 26.5–33)
MCHC RBC AUTO-ENTMCNC: 33.7 G/DL (ref 31.5–36.5)
MCV RBC AUTO: 90 FL (ref 78–100)
PLATELET # BLD AUTO: 301 10E9/L (ref 150–450)
POTASSIUM SERPL-SCNC: 3.1 MMOL/L (ref 3.4–5.3)
PROT SERPL-MCNC: 8.1 G/DL (ref 6.8–8.8)
RBC # BLD AUTO: 5.4 10E12/L (ref 3.8–5.2)
SODIUM SERPL-SCNC: 138 MMOL/L (ref 133–144)
TSH SERPL DL<=0.005 MIU/L-ACNC: 1 MU/L (ref 0.4–4)
WBC # BLD AUTO: 9.8 10E9/L (ref 4–11)

## 2019-06-06 PROCEDURE — 80053 COMPREHEN METABOLIC PANEL: CPT | Performed by: NURSE PRACTITIONER

## 2019-06-06 PROCEDURE — 84443 ASSAY THYROID STIM HORMONE: CPT | Performed by: NURSE PRACTITIONER

## 2019-06-06 PROCEDURE — 36415 COLL VENOUS BLD VENIPUNCTURE: CPT | Performed by: NURSE PRACTITIONER

## 2019-06-06 PROCEDURE — 85027 COMPLETE CBC AUTOMATED: CPT | Performed by: NURSE PRACTITIONER

## 2019-06-06 PROCEDURE — 99215 OFFICE O/P EST HI 40 MIN: CPT | Performed by: NURSE PRACTITIONER

## 2019-06-06 SDOH — HEALTH STABILITY: MENTAL HEALTH: HOW OFTEN DO YOU HAVE A DRINK CONTAINING ALCOHOL?: NEVER

## 2019-06-06 ASSESSMENT — ANXIETY QUESTIONNAIRES
7. FEELING AFRAID AS IF SOMETHING AWFUL MIGHT HAPPEN: NOT AT ALL
3. WORRYING TOO MUCH ABOUT DIFFERENT THINGS: SEVERAL DAYS
2. NOT BEING ABLE TO STOP OR CONTROL WORRYING: SEVERAL DAYS
1. FEELING NERVOUS, ANXIOUS, OR ON EDGE: SEVERAL DAYS
5. BEING SO RESTLESS THAT IT IS HARD TO SIT STILL: SEVERAL DAYS
GAD7 TOTAL SCORE: 10
4. TROUBLE RELAXING: NEARLY EVERY DAY
6. BECOMING EASILY ANNOYED OR IRRITABLE: NEARLY EVERY DAY
IF YOU CHECKED OFF ANY PROBLEMS ON THIS QUESTIONNAIRE, HOW DIFFICULT HAVE THESE PROBLEMS MADE IT FOR YOU TO DO YOUR WORK, TAKE CARE OF THINGS AT HOME, OR GET ALONG WITH OTHER PEOPLE: SOMEWHAT DIFFICULT

## 2019-06-06 ASSESSMENT — MIFFLIN-ST. JEOR: SCORE: 1445.66

## 2019-06-06 ASSESSMENT — PATIENT HEALTH QUESTIONNAIRE - PHQ9: SUM OF ALL RESPONSES TO PHQ QUESTIONS 1-9: 9

## 2019-06-06 NOTE — PROGRESS NOTES
[facility] [place]  [address] [address]  [phone]  Dept: [phone]    PRE-OP EVALUATION:  Today's date: 2019    Britni Tavarez (: 1960) presents for pre-operative evaluation assessment as requested by Dr. Escobedo.  She requires evaluation and anesthesia risk assessment prior to undergoing surgery/procedure for treatment of  Ankle Deep Hardware Removal,Debridement of Subtalar and Talonnavicular Joints    Proposed Surgery/ Procedure: Ankle Deep Hardware Removal,Debridement of Subtalar and Talonnavicular Joints  Date of Surgery/ Procedure: 19   Time of Surgery/ Procedure: 2:00 PM   Hospital/Surgical Facility: [facility]   Primary Physician: Chaka Bradford  Type of Anesthesia Anticipated: combined general w/ popliteal block     Patient has a Health Care Directive or Living Will:  NO      1. NO - Do you have a history of heart attack, stroke, stent, bypass or surgery on an artery in the head, neck, heart or legs?  2. NO - Do you ever have any pain or discomfort in your chest?  3. NO - Do you have a history of  Heart Failure?  4. YES - Are you troubled by shortness of breath when: walking on the level, up a slight hill or at night?  5. NO - Do you currently have a cold, bronchitis or other respiratory infection? History of chronic recurrent sinus infections    6. NO - Do you have a cough, shortness of breath or wheezing?  7. YES - Do you sometimes get pains in the calves of your legs when you walk? Right ankle and foot  8. NO - Do you or anyone in your family have previous history of blood clots?  9. NO - Do you or does anyone in your family have a serious bleeding problem such as prolonged bleeding following surgeries or cuts?  10. NO - Have you ever had problems with anemia or been told to take iron pills?   11. NO - Have you had any abnormal blood loss such as black, tarry or bloody stools, or abnormal vaginal bleeding?  12. NO -  Have you ever had a blood transfusion?  13. NO - Have you or any of your relatives ever had problems with anesthesia?  14. YES - Do you have sleep apnea, excessive snoring or daytime drowsiness? Snoring   15. NO - Do you have any prosthetic heart valves?  16. NO - Do you have prosthetic joints?  17. NO - Is there any chance that you may be pregnant?      HPI:     HPI related to upcoming procedure: history of chronic pain in right ankle and foot, history of recurrent cellulitis in the right foot, history of right 5 th metatarsal fracture and post fusion arthritis of right ankle, scheduled for surgery: Ankle Deep Hardware Removal,Debridement of Subtalar and Talonnavicular Joints    HYPERTENSION - Patient has longstanding history of HTN , currently denies any symptoms referable to elevated blood pressure. Specifically denies chest pain, palpitations, dyspnea, orthopnea, PND or peripheral edema. Blood pressure readings have been in normal range. Current medication regimen is as listed below. Patient denies any side effects of medication.     HYPOTHYROIDISM - Patient has a longstanding history of chronic Hypothyroidism. Patient has been doing well, noting no tremor, insomnia, hair loss or changes in skin texture. Continues to take medications as directed, without adverse reactions or side effects. Last TSH   Lab Results   Component Value Date    TSH 1.00 06/06/2019   .        MEDICAL HISTORY:     Patient Active Problem List    Diagnosis Date Noted     Recurrent sinus infections 06/06/2019     Priority: Medium     SNHL (sensorineural hearing loss) 04/17/2017     Priority: Medium     Tinnitus, bilateral 04/17/2017     Priority: Medium     Benign essential hypertension 04/17/2017     Priority: Medium     Hypercholesteremia 04/17/2017     Priority: Medium     Fibromyalgia 04/17/2017     Priority: Medium     History of tympanoplasty of right ear 04/17/2017     Priority: Medium     Depression 10/13/2016     Priority: Medium      Midline low back pain without sciatica 06/09/2015     Priority: Medium     Spinal enthesopathy (H) 01/20/2015     Priority: Medium     Pain medication agreement 12/06/2011     Priority: Medium     Overview:   Signed 12/6/11 - OK for #75 Norco (Hydrocodone/Acetaminophen 5/325 mg) per month.  Referral to Pain Clinic made in July 2016, patient says she has an appointment October 2016. In 12/2016 Patient says she went to a pain clinic and they wanted her to come off her blood pressure meds and continue Norco (no record of that visit have been received).  her  checked 3/10/2017 showing compliance with her agreement.  Last urine drug screen 9/2016 shows no unexpected findings.       Hypothyroidism (acquired) 12/13/2005     Priority: Medium     Migraine headache 12/13/2005     Priority: Medium     Overview:   Was on propranolol for prevention but caused falls  Also takes imipramine.  100 mg each night, consider decreasing as HA less frequent as of 12/2016  Neurology referral as of September 2016-didn't go because HA less frequent.       Osteopenia 12/13/2005     Priority: Medium     Overview:   Last DEXA in 2012 (stable), repeat in 2014--Declined in 2014.  Normal vitamin D level x 2.        History reviewed. No pertinent past medical history.  History reviewed. No pertinent surgical history.  Current Outpatient Medications   Medication Sig Dispense Refill     AMLODIPINE BESYLATE PO Take 10 mg by mouth daily       Ascorbic Acid (VITAMIN C PO) Take 500 mg by mouth daily       CRANBERRY PO Take by mouth daily       fish oil-omega-3 fatty acids 1000 MG capsule Take 1 g by mouth daily       glucosamine-chondroitin 500-400 MG CAPS per capsule Take 1 capsule by mouth daily       HYDRALAZINE-HCTZ PO Take 25 mg by mouth       imipramine (TOFRANIL) 50 MG tablet Take 150 mg by mouth At Bedtime        Levocetirizine Dihydrochloride (XYZAL PO) Take 5 mg by mouth every evening       levothyroxine (SYNTHROID/LEVOTHROID) 112 MCG  "tablet Take 112 mcg by mouth daily       MAGNESIUM OXIDE PO Take 400 mg by mouth daily       Multiple Vitamins-Minerals (MULTIVITAMIN ADULT PO)        oxyCODONE-acetaminophen (PERCOCET)  MG per tablet Take 1 tablet by mouth every 6 hours as needed for severe pain       oxyCODONE-acetaminophen (PERCOCET) 5-325 MG per tablet Take 1 tablet by mouth every 8 hours as needed for moderate to severe pain       pravastatin (PRAVACHOL) 20 MG tablet Take by mouth daily       pregabalin (LYRICA) 100 MG capsule Take by mouth 3 times daily       tiZANidine (ZANAFLEX) 4 MG tablet Take 8 mg by mouth At Bedtime        Turmeric, Curcuma Longa, (TURMERIC ROOT) POWD        VITAMIN D, CHOLECALCIFEROL, PO Take 2,000 Units by mouth daily       OTC products: none    Allergies   Allergen Reactions     Atorvastatin      Augmentin      Cortisone      Flagyl [Metronidazole]      Ibuprofen      Morphine      Penicillins      Prednisone      Propranolol      Simvastatin      Sulfa Drugs      Valtrex [Valacyclovir]       Latex Allergy: NO    Social History     Tobacco Use     Smoking status: Current Every Day Smoker     Packs/day: 1.00     Smokeless tobacco: Never Used   Substance Use Topics     Alcohol use: Not Currently     Frequency: Never     History   Drug Use Unknown       REVIEW OF SYSTEMS:   Constitutional, HEENT, cardiovascular, pulmonary, gi and gu systems are negative, except as otherwise noted.    EXAM:   /74 (BP Location: Left arm, Patient Position: Sitting, Cuff Size: Adult Large)   Pulse 74   Temp 97.4  F (36.3  C) (Tympanic)   Resp 16   Ht 1.562 m (5' 1.5\")   Wt 92 kg (202 lb 14.4 oz)   SpO2 97%   BMI 37.72 kg/m      GENERAL APPEARANCE: healthy, alert and no distress     EYES: EOMI, PERRL     HENT: ear canals and TM's normal and nose and mouth without ulcers or lesions     NECK: no adenopathy, no asymmetry, masses, or scars and thyroid normal to palpation     RESP: lungs clear to auscultation - no rales, rhonchi " or wheezes     CV: regular rates and rhythm, normal S1 S2, no S3 or S4 and no murmur, click or rub     MS: extremities normal- no gross deformities noted, no evidence of inflammation in joints, FROM in all extremities.     SKIN: no suspicious lesions or rashes     NEURO: Normal strength and tone, sensory exam grossly normal, mentation intact and speech normal     PSYCH: mentation appears normal. and affect normal/bright     LYMPHATICS: No cervical adenopathy    DIAGNOSTICS:     Labs Resulted Today:   Results for orders placed or performed in visit on 06/06/19   TSH with free T4 reflex   Result Value Ref Range    TSH 1.00 0.40 - 4.00 mU/L   Comprehensive metabolic panel (BMP + Alb, Alk Phos, ALT, AST, Total. Bili, TP)   Result Value Ref Range    Sodium 138 133 - 144 mmol/L    Potassium 3.1 (L) 3.4 - 5.3 mmol/L    Chloride 99 94 - 109 mmol/L    Carbon Dioxide 33 (H) 20 - 32 mmol/L    Anion Gap 6 3 - 14 mmol/L    Glucose 106 (H) 70 - 99 mg/dL    Urea Nitrogen 13 7 - 30 mg/dL    Creatinine 0.66 0.52 - 1.04 mg/dL    GFR Estimate >90 >60 mL/min/[1.73_m2]    GFR Estimate If Black >90 >60 mL/min/[1.73_m2]    Calcium 10.1 8.5 - 10.1 mg/dL    Bilirubin Total 0.2 0.2 - 1.3 mg/dL    Albumin 3.9 3.4 - 5.0 g/dL    Protein Total 8.1 6.8 - 8.8 g/dL    Alkaline Phosphatase 98 40 - 150 U/L    ALT 19 0 - 50 U/L    AST 22 0 - 45 U/L   CBC with platelets   Result Value Ref Range    WBC 9.8 4.0 - 11.0 10e9/L    RBC Count 5.40 (H) 3.8 - 5.2 10e12/L    Hemoglobin 16.4 (H) 11.7 - 15.7 g/dL    Hematocrit 48.7 (H) 35.0 - 47.0 %    MCV 90 78 - 100 fl    MCH 30.4 26.5 - 33.0 pg    MCHC 33.7 31.5 - 36.5 g/dL    RDW 13.7 10.0 - 15.0 %    Platelet Count 301 150 - 450 10e9/L       Recent Labs   Lab Test 12/22/17  1122   HGB 14.4           IMPRESSION:   Reason for surgery/procedure: chronic pain of the r ankle and history of recurrent cellulitis   Diagnosis/reason for consult: pre-op evaluation     The proposed surgical procedure is  considered INTERMEDIATE risk.    REVISED CARDIAC RISK INDEX  The patient has the following serious cardiovascular risks for perioperative complications such as (MI, PE, VFib and 3  AV Block):  No serious cardiac risks  INTERPRETATION: 0 risks: Class I (very low risk - 0.4% complication rate)    The patient has the following additional risks for perioperative complications:  No identified additional risks      ICD-10-CM    1. Preop general physical exam Z01.818 Comprehensive metabolic panel (BMP + Alb, Alk Phos, ALT, AST, Total. Bili, TP)     CBC with platelets   2. Hypothyroidism, unspecified type E03.9 TSH with free T4 reflex   3. Benign essential hypertension I10 Comprehensive metabolic panel (BMP + Alb, Alk Phos, ALT, AST, Total. Bili, TP)   4. Special screening for malignant neoplasms, colon Z12.11 Fecal colorectal cancer screen (FIT)   5. Right foot pain M79.671        RECOMMENDATIONS:       Cardiovascular Risk  Performs 4 METs exercise without symptoms (Light housework (dusting, washing dishes) and Climb a flight of stairs) .       --Patient is to take all scheduled medications on the day of surgery EXCEPT for modifications listed below.  --hold hydrochlorothiazide  24 hrs prior surgery  -mild hypokalemia, potassium replacement for 3 days and recheck Potassium level on 6/10 prior surgery.    Addendum:   The patient had very low Potassium on 6/11, was seen in ER, Potassium was replaced but not rechecked. Recheck is ordered. The patient will be cleared for surgery only if her Potassium normal.   Recheck Potassium on 6/13 still slightly low 3.2, but in acceptable range for surgery     The patient is cleared for surgery    Follow up in 1 week to recheck labs and BP. Recheck Potassium after surgery and replace if still low.   Start Potassium supplement after surgery 20 MEQ daily.        Signed Electronically by: JOSE Bailey CNP    Copy of this evaluation report is provided to requesting  physician.    [facility] Preop Guidelines    Revised Cardiac Risk Index

## 2019-06-06 NOTE — PATIENT INSTRUCTIONS
Before Your Surgery      Call your surgeon if there is any change in your health. This includes signs of a cold or flu (such as a sore throat, runny nose, cough, rash or fever).    Do not smoke, drink alcohol or take over the counter medicine (unless your surgeon or primary care doctor tells you to) for the 24 hours before and after surgery.    If you take prescribed drugs: Follow your doctor s orders about which medicines to take and which to stop until after surgery.    Eating and drinking prior to surgery: follow the instructions from your surgeon    Take a shower or bath the night before surgery. Use the soap your surgeon gave you to gently clean your skin. If you do not have soap from your surgeon, use your regular soap. Do not shave or scrub the surgery site.  Wear clean pajamas and have clean sheets on your bed.   Hold hydrochlorothiazide  24 hrs prior anesthesia

## 2019-06-07 RX ORDER — POTASSIUM CHLORIDE 750 MG/1
10 TABLET, EXTENDED RELEASE ORAL 2 TIMES DAILY
Qty: 6 TABLET | Refills: 0 | Status: SHIPPED | OUTPATIENT
Start: 2019-06-07 | End: 2019-06-11

## 2019-06-07 ASSESSMENT — ANXIETY QUESTIONNAIRES: GAD7 TOTAL SCORE: 10

## 2019-06-10 RX ORDER — HYDROCHLOROTHIAZIDE 25 MG/1
25 TABLET ORAL EVERY EVENING
Refills: 0 | COMMUNITY
Start: 2019-05-24 | End: 2019-07-03

## 2019-06-11 ENCOUNTER — APPOINTMENT (OUTPATIENT)
Dept: ULTRASOUND IMAGING | Facility: CLINIC | Age: 59
End: 2019-06-11
Attending: EMERGENCY MEDICINE
Payer: COMMERCIAL

## 2019-06-11 ENCOUNTER — TELEPHONE (OUTPATIENT)
Dept: FAMILY MEDICINE | Facility: CLINIC | Age: 59
End: 2019-06-11

## 2019-06-11 ENCOUNTER — HOSPITAL ENCOUNTER (EMERGENCY)
Facility: CLINIC | Age: 59
Discharge: HOME | End: 2019-06-11
Attending: EMERGENCY MEDICINE | Admitting: EMERGENCY MEDICINE
Payer: COMMERCIAL

## 2019-06-11 VITALS
BODY MASS INDEX: 37.32 KG/M2 | OXYGEN SATURATION: 87 % | WEIGHT: 202.82 LBS | HEIGHT: 62 IN | HEART RATE: 80 BPM | RESPIRATION RATE: 18 BRPM | TEMPERATURE: 98.1 F | DIASTOLIC BLOOD PRESSURE: 66 MMHG | SYSTOLIC BLOOD PRESSURE: 134 MMHG

## 2019-06-11 DIAGNOSIS — R06.02 SOB (SHORTNESS OF BREATH): ICD-10-CM

## 2019-06-11 DIAGNOSIS — E87.6 HYPOKALEMIA: ICD-10-CM

## 2019-06-11 DIAGNOSIS — R60.0 BILATERAL LEG EDEMA: ICD-10-CM

## 2019-06-11 DIAGNOSIS — G89.29 CHRONIC FOOT PAIN, RIGHT: ICD-10-CM

## 2019-06-11 DIAGNOSIS — M79.671 CHRONIC FOOT PAIN, RIGHT: ICD-10-CM

## 2019-06-11 DIAGNOSIS — E87.6 HYPOKALEMIA: Primary | ICD-10-CM

## 2019-06-11 LAB
ANION GAP SERPL CALCULATED.3IONS-SCNC: 4 MMOL/L (ref 3–14)
BASOPHILS # BLD AUTO: 0.1 10E9/L (ref 0–0.2)
BASOPHILS NFR BLD AUTO: 0.7 %
BUN SERPL-MCNC: 8 MG/DL (ref 7–30)
CALCIUM SERPL-MCNC: 9.3 MG/DL (ref 8.5–10.1)
CHLORIDE SERPL-SCNC: 99 MMOL/L (ref 94–109)
CO2 SERPL-SCNC: 35 MMOL/L (ref 20–32)
CREAT SERPL-MCNC: 0.57 MG/DL (ref 0.52–1.04)
CRP SERPL-MCNC: 37.5 MG/L (ref 0–8)
DIFFERENTIAL METHOD BLD: ABNORMAL
EOSINOPHIL # BLD AUTO: 0.2 10E9/L (ref 0–0.7)
EOSINOPHIL NFR BLD AUTO: 1.7 %
ERYTHROCYTE [DISTWIDTH] IN BLOOD BY AUTOMATED COUNT: 13.1 % (ref 10–15)
ERYTHROCYTE [SEDIMENTATION RATE] IN BLOOD BY WESTERGREN METHOD: 15 MM/H (ref 0–30)
GFR SERPL CREATININE-BSD FRML MDRD: >90 ML/MIN/{1.73_M2}
GLUCOSE SERPL-MCNC: 115 MG/DL (ref 70–99)
HCT VFR BLD AUTO: 43.7 % (ref 35–47)
HGB BLD-MCNC: 14.3 G/DL (ref 11.7–15.7)
IMM GRANULOCYTES # BLD: 0.1 10E9/L (ref 0–0.4)
IMM GRANULOCYTES NFR BLD: 0.8 %
LYMPHOCYTES # BLD AUTO: 3.6 10E9/L (ref 0.8–5.3)
LYMPHOCYTES NFR BLD AUTO: 29.6 %
MCH RBC QN AUTO: 29.6 PG (ref 26.5–33)
MCHC RBC AUTO-ENTMCNC: 32.7 G/DL (ref 31.5–36.5)
MCV RBC AUTO: 91 FL (ref 78–100)
MONOCYTES # BLD AUTO: 1.1 10E9/L (ref 0–1.3)
MONOCYTES NFR BLD AUTO: 8.7 %
NEUTROPHILS # BLD AUTO: 7.1 10E9/L (ref 1.6–8.3)
NEUTROPHILS NFR BLD AUTO: 58.5 %
NRBC # BLD AUTO: 0 10*3/UL
NRBC BLD AUTO-RTO: 0 /100
NT-PROBNP SERPL-MCNC: 109 PG/ML (ref 0–900)
PLATELET # BLD AUTO: 315 10E9/L (ref 150–450)
POTASSIUM SERPL-SCNC: 2.5 MMOL/L (ref 3.4–5.3)
RBC # BLD AUTO: 4.83 10E12/L (ref 3.8–5.2)
SODIUM SERPL-SCNC: 138 MMOL/L (ref 133–144)
WBC # BLD AUTO: 12.1 10E9/L (ref 4–11)

## 2019-06-11 PROCEDURE — 96365 THER/PROPH/DIAG IV INF INIT: CPT | Performed by: EMERGENCY MEDICINE

## 2019-06-11 PROCEDURE — 80048 BASIC METABOLIC PNL TOTAL CA: CPT | Performed by: EMERGENCY MEDICINE

## 2019-06-11 PROCEDURE — 99285 EMERGENCY DEPT VISIT HI MDM: CPT | Mod: 25 | Performed by: EMERGENCY MEDICINE

## 2019-06-11 PROCEDURE — 93970 EXTREMITY STUDY: CPT

## 2019-06-11 PROCEDURE — 99284 EMERGENCY DEPT VISIT MOD MDM: CPT | Mod: Z6 | Performed by: EMERGENCY MEDICINE

## 2019-06-11 PROCEDURE — 86140 C-REACTIVE PROTEIN: CPT | Performed by: EMERGENCY MEDICINE

## 2019-06-11 PROCEDURE — 85025 COMPLETE CBC W/AUTO DIFF WBC: CPT | Performed by: EMERGENCY MEDICINE

## 2019-06-11 PROCEDURE — 83880 ASSAY OF NATRIURETIC PEPTIDE: CPT | Performed by: EMERGENCY MEDICINE

## 2019-06-11 PROCEDURE — 25000128 H RX IP 250 OP 636: Performed by: EMERGENCY MEDICINE

## 2019-06-11 PROCEDURE — 96366 THER/PROPH/DIAG IV INF ADDON: CPT | Performed by: EMERGENCY MEDICINE

## 2019-06-11 PROCEDURE — 85652 RBC SED RATE AUTOMATED: CPT | Performed by: EMERGENCY MEDICINE

## 2019-06-11 PROCEDURE — 25000132 ZZH RX MED GY IP 250 OP 250 PS 637: Performed by: EMERGENCY MEDICINE

## 2019-06-11 RX ORDER — POTASSIUM CHLORIDE 750 MG/1
20 TABLET, EXTENDED RELEASE ORAL 2 TIMES DAILY
Qty: 6 TABLET | Refills: 0 | Status: ON HOLD | OUTPATIENT
Start: 2019-06-11 | End: 2019-07-04

## 2019-06-11 RX ORDER — POTASSIUM CHLORIDE 1.5 G/1.58G
60 POWDER, FOR SOLUTION ORAL ONCE
Status: COMPLETED | OUTPATIENT
Start: 2019-06-11 | End: 2019-06-11

## 2019-06-11 RX ORDER — POTASSIUM CL/LIDO/0.9 % NACL 10MEQ/0.1L
10 INTRAVENOUS SOLUTION, PIGGYBACK (ML) INTRAVENOUS
Status: COMPLETED | OUTPATIENT
Start: 2019-06-11 | End: 2019-06-11

## 2019-06-11 RX ORDER — OXYCODONE HCL 10 MG/1
10 TABLET, FILM COATED, EXTENDED RELEASE ORAL
COMMUNITY

## 2019-06-11 RX ORDER — LEVOFLOXACIN 500 MG/1
500 TABLET, FILM COATED ORAL DAILY
Qty: 5 TABLET | Refills: 0 | Status: SHIPPED | OUTPATIENT
Start: 2019-06-11 | End: 2019-07-03

## 2019-06-11 RX ORDER — PRAVASTATIN SODIUM 40 MG
40 TABLET ORAL DAILY
COMMUNITY
End: 2019-07-03

## 2019-06-11 RX ADMIN — Medication 10 MEQ: at 09:15

## 2019-06-11 RX ADMIN — Medication 10 MEQ: at 08:07

## 2019-06-11 RX ADMIN — Medication 10 MEQ: at 07:07

## 2019-06-11 RX ADMIN — POTASSIUM CHLORIDE 60 MEQ: 1.5 POWDER, FOR SOLUTION ORAL at 07:09

## 2019-06-11 ASSESSMENT — ENCOUNTER SYMPTOMS
SHORTNESS OF BREATH: 0
FEVER: 0
ABDOMINAL PAIN: 0

## 2019-06-11 ASSESSMENT — MIFFLIN-ST. JEOR: SCORE: 1445.31

## 2019-06-11 NOTE — TELEPHONE ENCOUNTER
Patient was in Emergency Department today and  had pre-op done 6/6/19.  Same day surgery is Asking if cleared for surgery Thursday?  Provider please see message below and advise. Thank you.

## 2019-06-11 NOTE — DISCHARGE INSTRUCTIONS
1) Evaluation and work-up in the emergency department today suggest- no exact cause for your leg swelling.  You are noted to have low potassium (lower from your clinic visit 5 days ago).  We have agreed to discharge you home with plan for continued potassium repletion and follow-up in clinic with both your primary podiatrist will be performed your procedure and your primary care provider.    2) plan is to have you take antibiotics daily to your surgery on June 13, 2019.  Dr. Escobedo-team will call you for follow-up care and additional recommendations.    3) have your potassium rechecked either during your evaluation with your podiatrist or with your primary care provider in the next 2 to 3 days.

## 2019-06-11 NOTE — ED PROVIDER NOTES
History     Chief Complaint   Patient presents with     Shortness of Breath     states onset this morning     Leg Swelling     onset 4-5 weeks. States recent diagnosis of cellulitis.     HPI  Britni Tavarez is a 59 year old female who has past medical history significant for chronic pain, hypertension, hyperlipidemia, fibromyalgia, with planned surgical procedure in 2 days, with ankle hardware removal with debridement of the talonavicular and subtalar joints, who presents to the emergency department with concerns of warmth of the lower extremities, in addition to bilateral lower extremity edema.  Patient was seen on June 6, 2019 with her primary care provider for preop physical exam.  Noted to have slight hypokalemia, and therefore was instructed to have repeat potassium performed yesterday, however patient was unable to obtain this.  She presents today because of concerns of wanting to make sure that everything is checked out before her surgical procedure.  Also is concerned because of increased amounts of bilateral lower extremity swelling.  Denies any chest pain.  Does have some shortness of breath, which is not present currently.  States that she has not had any chest pain.  However, does have some shortness of breath with exertional type activities.  No fever.  No chills.  No abdominal pains.  No history of blood clots.    Allergies:  Allergies   Allergen Reactions     Atorvastatin      Augmentin      Cortisone      Flagyl [Metronidazole]      Ibuprofen      Morphine      Penicillins      Prednisone      Propranolol      Simvastatin      Sulfa Drugs      Valtrex [Valacyclovir]        Problem List:    Patient Active Problem List    Diagnosis Date Noted     Recurrent sinus infections 06/06/2019     Priority: Medium     SNHL (sensorineural hearing loss) 04/17/2017     Priority: Medium     Tinnitus, bilateral 04/17/2017     Priority: Medium     Benign essential hypertension 04/17/2017     Priority: Medium      Hypercholesteremia 04/17/2017     Priority: Medium     Fibromyalgia 04/17/2017     Priority: Medium     History of tympanoplasty of right ear 04/17/2017     Priority: Medium     Depression 10/13/2016     Priority: Medium     Midline low back pain without sciatica 06/09/2015     Priority: Medium     Spinal enthesopathy (H) 01/20/2015     Priority: Medium     Pain medication agreement 12/06/2011     Priority: Medium     Overview:   Signed 12/6/11 - OK for #75 Norco (Hydrocodone/Acetaminophen 5/325 mg) per month.  Referral to Pain Clinic made in July 2016, patient says she has an appointment October 2016. In 12/2016 Patient says she went to a pain clinic and they wanted her to come off her blood pressure meds and continue Norco (no record of that visit have been received).  her  checked 3/10/2017 showing compliance with her agreement.  Last urine drug screen 9/2016 shows no unexpected findings.       Hypothyroidism (acquired) 12/13/2005     Priority: Medium     Migraine headache 12/13/2005     Priority: Medium     Overview:   Was on propranolol for prevention but caused falls  Also takes imipramine.  100 mg each night, consider decreasing as HA less frequent as of 12/2016  Neurology referral as of September 2016-didn't go because HA less frequent.       Osteopenia 12/13/2005     Priority: Medium     Overview:   Last DEXA in 2012 (stable), repeat in 2014--Declined in 2014.  Normal vitamin D level x 2.          Past Medical History:    Past Medical History:   Diagnosis Date     Back pain      Cellulitis      Chronic pain      Depressive disorder      Elevated cholesterol      Fibromyalgia      Hypertension      Hypothyroid      Migraines      Osteopenia      Recurrent sinus infections      Spinal enthesopathy (H)        Past Surgical History:    Past Surgical History:   Procedure Laterality Date     HYSTERECTOMY       TYMPANOPLASTY         Family History:    Family History   Problem Relation Age of Onset      "Cancer Father         Lung        Social History:  Marital Status:   [5]  Social History     Tobacco Use     Smoking status: Current Every Day Smoker     Packs/day: 1.00     Smokeless tobacco: Never Used   Substance Use Topics     Alcohol use: Not Currently     Frequency: Never     Drug use: Never        Medications:      AMLODIPINE BESYLATE PO   Ascorbic Acid (VITAMIN C PO)   CRANBERRY PO   fish oil-omega-3 fatty acids 1000 MG capsule   glucosamine-chondroitin 500-400 MG CAPS per capsule   HYDRALAZINE-HCTZ PO   hydrochlorothiazide (HYDRODIURIL) 25 MG tablet   imipramine (TOFRANIL) 50 MG tablet   Levocetirizine Dihydrochloride (XYZAL PO)   levothyroxine (SYNTHROID/LEVOTHROID) 112 MCG tablet   MAGNESIUM OXIDE PO   Multiple Vitamins-Minerals (MULTIVITAMIN ADULT PO)   oxyCODONE-acetaminophen (PERCOCET)  MG per tablet   oxyCODONE-acetaminophen (PERCOCET) 5-325 MG per tablet   pravastatin (PRAVACHOL) 20 MG tablet   pregabalin (LYRICA) 100 MG capsule   tiZANidine (ZANAFLEX) 4 MG tablet   Turmeric, Curcuma Longa, (TURMERIC ROOT) POWD   VITAMIN D, CHOLECALCIFEROL, PO         Review of Systems   Constitutional: Negative for fever.   Respiratory: Negative for shortness of breath.    Cardiovascular: Positive for leg swelling. Negative for chest pain.   Gastrointestinal: Negative for abdominal pain.   All other systems reviewed and are negative.      Physical Exam   BP: 174/76  Pulse: 82  Temp: 98.1  F (36.7  C)  Resp: 18  Height: 156.2 cm (5' 1.5\")  Weight: 92 kg (202 lb 13.2 oz)  SpO2: 97 %      Physical Exam  /76   Pulse 82   Temp 98.1  F (36.7  C) (Oral)   Resp 18   Ht 1.562 m (5' 1.5\")   Wt 92 kg (202 lb 13.2 oz)   SpO2 97%   BMI 37.70 kg/m    General: alert, interactive, in no apparent distress  Head: atraumatic  Nose: no rhinorrhea or epistaxis  Ears: no external auditory canal discharge or bleeding.    Eyes: Sclera nonicteric. Conjunctiva noninjected.   Mouth: no tonsillar erythema, edema, or " exudate  Neck: supple, no palp LAD  Lungs: CTAB  CV: RRR, S1/S2; peripheral pulses palpable and symmetric  Abdomen: soft, nt, nd, no guarding or rebound. Positive bowel sounds  Extremities: no cyanosis or edema            Skin: no rash or diaphoresis  Neuro:   strength 5/5 in UE and LEs bilaterally, sensation intact to light touch in UE and LEs bilaterally;       ED Course        Procedures               Critical Care time:  none               Results for orders placed or performed during the hospital encounter of 06/11/19 (from the past 24 hour(s))   CBC with platelets differential   Result Value Ref Range    WBC 12.1 (H) 4.0 - 11.0 10e9/L    RBC Count 4.83 3.8 - 5.2 10e12/L    Hemoglobin 14.3 11.7 - 15.7 g/dL    Hematocrit 43.7 35.0 - 47.0 %    MCV 91 78 - 100 fl    MCH 29.6 26.5 - 33.0 pg    MCHC 32.7 31.5 - 36.5 g/dL    RDW 13.1 10.0 - 15.0 %    Platelet Count 315 150 - 450 10e9/L    Diff Method Automated Method     % Neutrophils 58.5 %    % Lymphocytes 29.6 %    % Monocytes 8.7 %    % Eosinophils 1.7 %    % Basophils 0.7 %    % Immature Granulocytes 0.8 %    Nucleated RBCs 0 0 /100    Absolute Neutrophil 7.1 1.6 - 8.3 10e9/L    Absolute Lymphocytes 3.6 0.8 - 5.3 10e9/L    Absolute Monocytes 1.1 0.0 - 1.3 10e9/L    Absolute Eosinophils 0.2 0.0 - 0.7 10e9/L    Absolute Basophils 0.1 0.0 - 0.2 10e9/L    Abs Immature Granulocytes 0.1 0 - 0.4 10e9/L    Absolute Nucleated RBC 0.0        Medications - No data to display    Assessments & Plan (with Medical Decision Making)  59 year old female, with past medical history significant for multiple medical issues, presenting to the emergency department with concerns regarding bilateral lower extremity edema, and wanting to get things checked out prior to her planned surgery to occur in 2 days.  Patient denies any recent fever.  She reports increased amounts of bilateral lower extremity swelling over the past couple days.  Patient has swelling up to approximately the level  of the knee.  No prior history of clots.  Patient also does have history of cellulitis, and was slightly concerned that she has had had warmth of the bilateral lower extremities.    Patient with a clinical exam that is not concerning for cellulitis.  There is no significant erythema.  There is lower extremity swelling, however does not feel overly warm, or appearing to be of cellulitis.  Patient has been on antibiotics as of approximately 2 weeks ago.  No antibiotics over the past 10 days.  At this point, I would likely defer until surgery in 2 days as any antibiotic therapy would potentially complicate the cultures that are planned to be performed during surgical procedure.  Additionally, patient with no clear signs of cellulitis at this point.    Patient was evaluated by myself, with laboratory tests which have been ordered, and are pending at this time.  At time of signout, patient is pending laboratory work-up, in addition to bilateral lower extremity ultrasound to rule out DVT.  Likely discharge home, with a follow-up as planned with surgery in 2 days.  Notably, patient had slight hypokalemia, with potassium of 3.1 on June 6.  Follow-up with potassium as patient has been on potassium supplementation.     I have reviewed the nursing notes.    I have reviewed the findings, diagnosis, plan and need for follow up with the patient.          Medication List      ASK your doctor about these medications    potassium chloride ER 10 MEQ CR tablet  Commonly known as:  K-DUR/KLOR-CON M  10 mEq, Oral, 2 TIMES DAILY  Ask about: Should I take this medication?            Final diagnoses:   Bilateral leg edema   SOB (shortness of breath)       6/11/2019   [facility] EMERGENCY DEPARTMENT     [place] Briceno MD  06/11/19 0609

## 2019-06-11 NOTE — ED NOTES
DATE:  6/11/2019   TIME OF RECEIPT FROM LAB:  0616  LAB TEST:  Potassium  LAB VALUE:  2.5  RESULTS GIVEN WITH READ-BACK TO (PROVIDER):  Selvin Fierro, *  TIME LAB VALUE REPORTED TO PROVIDER:   0616

## 2019-06-11 NOTE — ED TRIAGE NOTES
Pt states leg edema for 4-5 weeks. States has edema whenever she gets cellulitis. Pt states recently diagnosis of cellulitis with completion of a 2 week course of antibiotics. States SOB this morning.

## 2019-06-11 NOTE — ED AVS SNAPSHOT
[facility]  [address] [facility] [address]  Phone:  [phone]  Fax:  [phone]                                    Britni Tavarez   MRN: [id number]    Department:  [facility]   Date of Visit:  6/11/2019           After Visit Summary Signature Page    I have received my discharge instructions, and my questions have been answered. I have discussed any challenges I see with this plan with the nurse or doctor.    ..........................................................................................................................................  Patient/Patient Representative Signature      ..........................................................................................................................................  Patient Representative Print Name and Relationship to Patient    ..................................................               ................................................  Date                                   Time    ..........................................................................................................................................  Reviewed by Signature/Title    ...................................................              ..............................................  Date                                               Time          22EPIC Rev 08/18

## 2019-06-11 NOTE — TELEPHONE ENCOUNTER
Reason for Call:  Other call back    Detailed comments: Same day is calling asking that patient has pre op on 6/6/19 and if she is cleared for surgery needs to go back in OV note and state that she is cleared after her labs where done. They want to note that patient is in the ER right now and wants to make sure she is still of for surgery on Thursday.    Phone Number Same Day Surgery can be reached at: [phone]    Best Time: any    Can we leave a detailed message on this number? YES    Call taken on 6/11/2019 at 8:47 AM by Sheridan Escobedo

## 2019-06-11 NOTE — ED PROVIDER NOTES
"     Emergency Department Patient Sign-out       Brief HPI:  This is a 59 year old female signed out to me by Dr. CHRISTIAN Briceno at shift change at 6AM.  See Dr Briceno's ED Provider note for details of the presentation, working diagnosis, interventions, and ED course of care prior to assuming care.  Patient has multiple comorbidities including history of fibromyalgia, depression hypothyroidism migraine headaches spinal enthesopathy osteopenia.  She presented for concern for bilateral lower extremity swelling and recheck of her electrolytes, and \"wanting to make sure everything was checked out before surgery in 2 days\".  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  She is scheduled for podiatry surgery in 2 days with Dr. Nikita Escobedo.  Plan of care disposition pending based on ED course of care, work-up, diagnostic studies and work-up/ results.       Significant Events prior to my assuming care: I reviewed ED note by Dr Briceno, pending lab results ordered, recent clinic evaluation on June 6, 2019-for pre-op evaluation.      Significant Events after to my assuming care: Patient was seen and examined at 6.15AM, she was resting comfortably.  Handoff and plan of care was reviewed with patient.  She confirmed history as reported to Dr. Briceno.  She reported no chest pain or pressure, she did report a fall on Thursday home on June 6/2019 when she was at home with her grandson going to the bathroom tripping results in a bloody nose with the lights off in the bathroom.  She is unclear if this traumatic incident resulted in her increasing lower extremity swelling she did not report any related injury as result of her fall including hip pain she did report some left knee swelling.  No back pain.  No headache no neck pain no other complaints or concerns.   Plan of care was also reviewed.  We discussed that her potassium is 2.5 today lower than " it was on June 6, 2019.  She will need potassium repletion and will need to be discharged home potassium supplementation.  She reports she was on 12.5 mg of hydrochlorthiazide and this was increased to 25 mg daily.    Lab results today show a white count of 12.1.(normal on 6/6/19), BNP of 109.  CRP of 37.5, normal ESR.  Normal glucose normal renal function.  Normal Hgb/Hct.    IV potassium and oral potassium was ordered.  Lower extremity ultrasound was negative for DVT.  Please see the interpreting radiologist report below.      We discussed her elevated white count of 12.1 and CRP of 37.5 which are nonspecific but inflammatory/infectious markers. Patient did report she did not feel well and had mild warmth overlying her lower legs.  She did report she felt like the degree of swelling has improved during her time in the emergency department.  We discussed options for care in light of her upcoming surgery in the next 48 hours.  Patient was documented to have shortness of breath, as her chief complaint prior to arrival in the emergency department.  She reported on my exam that her shortness of breath was improved.  She admits to smoking on average half a pack per day but has been smoking more lately because of anxiety and her upcoming surgery.  Lung exam was clear without crackles or wheezing. (she was placed on supplemental oxygen because she desaturated while sleeping during care). No respiratory distress or shortness of breath while awake.    I spoke with Dr. GRACIELA Escobedo-by telephone at 7:30 AM who recommended patient be discharged home with oral antibiotics based on her lab markers patient's symptoms and history with plan to still keep her on the schedule for surgery on June 13, 2019. Dr Escobedo did report to follow-up with the patient.  Plan of care and discussion with her podiatrist was reviewed with the patient.  I recommended a recheck of her potassium in clinic in the next 48 hours  Either preop prior to her  "surgery on June 13, 2019 or in clinic through primary care.      Exam: ,  Patient Vitals for the past 24 hrs:   BP Temp Temp src Pulse Heart Rate Resp SpO2 Height Weight   06/11/19 0900 -- -- -- 80 80 18 (!) 87 % -- --   06/11/19 0830 -- -- -- 80 78 15 95 % -- --   06/11/19 0800 134/66 -- -- 86 85 23 91 % -- --   06/11/19 0750 133/69 -- -- -- 80 -- -- -- --   06/11/19 0730 112/71 -- -- 81 78 26 94 % -- --   06/11/19 0720 135/62 -- -- 80 -- -- -- -- --   06/11/19 0537 174/76 98.1  F (36.7  C) Oral 82 -- 18 97 % 1.562 m (5' 1.5\") 92 kg (202 lb 13.2 oz)           ED RESULTS:   Results for orders placed or performed during the hospital encounter of 06/11/19 (from the past 24 hour(s))   CBC with platelets differential     Status: Abnormal    Collection Time: 06/11/19  5:57 AM   Result Value Ref Range    WBC 12.1 (H) 4.0 - 11.0 10e9/L    RBC Count 4.83 3.8 - 5.2 10e12/L    Hemoglobin 14.3 11.7 - 15.7 g/dL    Hematocrit 43.7 35.0 - 47.0 %    MCV 91 78 - 100 fl    MCH 29.6 26.5 - 33.0 pg    MCHC 32.7 31.5 - 36.5 g/dL    RDW 13.1 10.0 - 15.0 %    Platelet Count 315 150 - 450 10e9/L    Diff Method Automated Method     % Neutrophils 58.5 %    % Lymphocytes 29.6 %    % Monocytes 8.7 %    % Eosinophils 1.7 %    % Basophils 0.7 %    % Immature Granulocytes 0.8 %    Nucleated RBCs 0 0 /100    Absolute Neutrophil 7.1 1.6 - 8.3 10e9/L    Absolute Lymphocytes 3.6 0.8 - 5.3 10e9/L    Absolute Monocytes 1.1 0.0 - 1.3 10e9/L    Absolute Eosinophils 0.2 0.0 - 0.7 10e9/L    Absolute Basophils 0.1 0.0 - 0.2 10e9/L    Abs Immature Granulocytes 0.1 0 - 0.4 10e9/L    Absolute Nucleated RBC 0.0    Basic metabolic panel     Status: Abnormal    Collection Time: 06/11/19  5:57 AM   Result Value Ref Range    Sodium 138 133 - 144 mmol/L    Potassium 2.5 (LL) 3.4 - 5.3 mmol/L    Chloride 99 94 - 109 mmol/L    Carbon Dioxide 35 (H) 20 - 32 mmol/L    Anion Gap 4 3 - 14 mmol/L    Glucose 115 (H) 70 - 99 mg/dL    Urea Nitrogen 8 7 - 30 mg/dL    " Creatinine 0.57 0.52 - 1.04 mg/dL    GFR Estimate >90 >60 mL/min/[1.73_m2]    GFR Estimate If Black >90 >60 mL/min/[1.73_m2]    Calcium 9.3 8.5 - 10.1 mg/dL   Nt probnp inpatient (BNP)     Status: None    Collection Time: 06/11/19  5:57 AM   Result Value Ref Range    N-Terminal Pro BNP Inpatient 109 0 - 900 pg/mL   CRP inflammation     Status: Abnormal    Collection Time: 06/11/19  5:57 AM   Result Value Ref Range    CRP Inflammation 37.5 (H) 0.0 - 8.0 mg/L   Erythrocyte sedimentation rate auto     Status: None    Collection Time: 06/11/19  5:57 AM   Result Value Ref Range    Sed Rate 15 0 - 30 mm/h   US Lower Extremity Venous Duplex Bilateral     Status: None    Collection Time: 06/11/19  6:54 AM    Narrative    US LOWER EXTREMITY VENOUS DUPLEX BILATERAL   6/11/2019 6:54 AM     HISTORY: Bilateral leg swelling and redness.    COMPARISON: None.    FINDINGS: Gray-scale, color and Doppler spectral analysis ultrasound  was performed of the legs. Compression and augmentation imaging was  performed.    There is no evidence for deep venous thrombosis. The veins compress  and augment normally.      Impression    IMPRESSION: No DVT.    INÉS BLACKWOOD MD       ED MEDICATIONS:   Medications   potassium chloride 10 mEq in 100 mL intermittent infusion with 10 mg lidocaine (10 mEq Intravenous New Bag 6/11/19 0915)   potassium chloride (KLOR-CON) Packet 60 mEq (60 mEq Oral Given 6/11/19 0709)         Impression:    ICD-10-CM    1. Bilateral leg edema R60.0 CBC with platelets differential     Basic metabolic panel     Nt probnp inpatient (BNP)     CRP inflammation     Erythrocyte sedimentation rate auto   2. SOB (shortness of breath) R06.02    3. Hypokalemia E87.6 potassium chloride ER (K-DUR/KLOR-CON M) 10 MEQ CR tablet    acute on chronic. Worsened from 6/6/19. Now 2.5, down from 3.1   4. Chronic foot pain, right M79.671     G89.29     With history of recurrent report of cellulitis and foot pain.  Plan for hardware removal on  6/13/19   5. Hypokalemia E87.6 potassium chloride ER (K-DUR/KLOR-CON M) 10 MEQ CR tablet       Plan:     Discharge to home with close outpatient follow-up with PCP and Dr GRACIELA Escobedo (Podiatry).      Selvin Fitzpatrick MD  06/11/19 6868

## 2019-06-12 ENCOUNTER — ANESTHESIA EVENT (OUTPATIENT)
Dept: SURGERY | Facility: CLINIC | Age: 59
End: 2019-06-12
Payer: COMMERCIAL

## 2019-06-12 NOTE — TELEPHONE ENCOUNTER
She needs to check Potassium before surgery. I can not clear her for surgery if she does not check her Potassium.     JOSE Bailey CNP

## 2019-06-12 NOTE — TELEPHONE ENCOUNTER
Pt returned call.  She does not want to come in today for more lab work because she had lab work done in ER yesterday and was given Potassium.  She wants to have lab work done when she comes in for surgery tomorrow.  Please call patient and advise.

## 2019-06-13 ENCOUNTER — HOSPITAL ENCOUNTER (OUTPATIENT)
Facility: CLINIC | Age: 59
Discharge: HOME | End: 2019-06-13
Attending: PODIATRIST | Admitting: PODIATRIST
Payer: COMMERCIAL

## 2019-06-13 ENCOUNTER — ANESTHESIA (OUTPATIENT)
Dept: SURGERY | Facility: CLINIC | Age: 59
End: 2019-06-13
Payer: COMMERCIAL

## 2019-06-13 ENCOUNTER — APPOINTMENT (OUTPATIENT)
Dept: GENERAL RADIOLOGY | Facility: CLINIC | Age: 59
End: 2019-06-13
Attending: PODIATRIST
Payer: COMMERCIAL

## 2019-06-13 VITALS
DIASTOLIC BLOOD PRESSURE: 74 MMHG | SYSTOLIC BLOOD PRESSURE: 132 MMHG | HEART RATE: 64 BPM | RESPIRATION RATE: 16 BRPM | OXYGEN SATURATION: 92 % | TEMPERATURE: 98.8 F

## 2019-06-13 DIAGNOSIS — G89.18 ACUTE POST-OPERATIVE PAIN: Primary | ICD-10-CM

## 2019-06-13 DIAGNOSIS — E87.6 HYPOKALEMIA: ICD-10-CM

## 2019-06-13 LAB
ANION GAP SERPL CALCULATED.3IONS-SCNC: 4 MMOL/L (ref 3–14)
BUN SERPL-MCNC: 8 MG/DL (ref 7–30)
CALCIUM SERPL-MCNC: 9.5 MG/DL (ref 8.5–10.1)
CHLORIDE SERPL-SCNC: 104 MMOL/L (ref 94–109)
CO2 SERPL-SCNC: 29 MMOL/L (ref 20–32)
CREAT SERPL-MCNC: 1.03 MG/DL (ref 0.52–1.04)
GFR SERPL CREATININE-BSD FRML MDRD: 60 ML/MIN/{1.73_M2}
GLUCOSE SERPL-MCNC: 117 MG/DL (ref 70–99)
GRAM STN SPEC: ABNORMAL
GRAM STN SPEC: ABNORMAL
Lab: ABNORMAL
POTASSIUM SERPL-SCNC: 3.2 MMOL/L (ref 3.4–5.3)
SODIUM SERPL-SCNC: 137 MMOL/L (ref 133–144)
SPECIMEN SOURCE: ABNORMAL

## 2019-06-13 PROCEDURE — 25000128 H RX IP 250 OP 636: Performed by: PODIATRIST

## 2019-06-13 PROCEDURE — 25000128 H RX IP 250 OP 636

## 2019-06-13 PROCEDURE — 27210794 ZZH OR GENERAL SUPPLY STERILE: Performed by: PODIATRIST

## 2019-06-13 PROCEDURE — 37000009 ZZH ANESTHESIA TECHNICAL FEE, EACH ADDTL 15 MIN: Performed by: PODIATRIST

## 2019-06-13 PROCEDURE — 25800030 ZZH RX IP 258 OP 636: Performed by: NURSE ANESTHETIST, CERTIFIED REGISTERED

## 2019-06-13 PROCEDURE — 25000128 H RX IP 250 OP 636: Performed by: NURSE ANESTHETIST, CERTIFIED REGISTERED

## 2019-06-13 PROCEDURE — 71000012 ZZH RECOVERY PHASE 1 LEVEL 1 FIRST HR: Performed by: PODIATRIST

## 2019-06-13 PROCEDURE — 37000011 ZZH ANESTHESIA WARD SERVICE: Performed by: NURSE ANESTHETIST, CERTIFIED REGISTERED

## 2019-06-13 PROCEDURE — 25000132 ZZH RX MED GY IP 250 OP 250 PS 637: Performed by: NURSE ANESTHETIST, CERTIFIED REGISTERED

## 2019-06-13 PROCEDURE — 25000125 ZZHC RX 250

## 2019-06-13 PROCEDURE — 87070 CULTURE OTHR SPECIMN AEROBIC: CPT | Performed by: PODIATRIST

## 2019-06-13 PROCEDURE — 80048 BASIC METABOLIC PNL TOTAL CA: CPT | Performed by: NURSE ANESTHETIST, CERTIFIED REGISTERED

## 2019-06-13 PROCEDURE — 87075 CULTR BACTERIA EXCEPT BLOOD: CPT | Performed by: PODIATRIST

## 2019-06-13 PROCEDURE — 36000054 ZZH SURGERY LEVEL 2 W FLUORO 1ST 30 MIN: Performed by: PODIATRIST

## 2019-06-13 PROCEDURE — 40000277 XR SURGERY CARM FLUORO LESS THAN 5 MIN W STILLS: Mod: TC

## 2019-06-13 PROCEDURE — 25000125 ZZHC RX 250: Performed by: NURSE ANESTHETIST, CERTIFIED REGISTERED

## 2019-06-13 PROCEDURE — 71000027 ZZH RECOVERY PHASE 2 EACH 15 MINS: Performed by: PODIATRIST

## 2019-06-13 PROCEDURE — 25800030 ZZH RX IP 258 OP 636: Performed by: PODIATRIST

## 2019-06-13 PROCEDURE — 40000305 ZZH STATISTIC PRE PROC ASSESS I: Performed by: PODIATRIST

## 2019-06-13 PROCEDURE — 36415 COLL VENOUS BLD VENIPUNCTURE: CPT | Performed by: NURSE PRACTITIONER

## 2019-06-13 PROCEDURE — 87176 TISSUE HOMOGENIZATION CULTR: CPT | Performed by: PODIATRIST

## 2019-06-13 PROCEDURE — 37000008 ZZH ANESTHESIA TECHNICAL FEE, 1ST 30 MIN: Performed by: PODIATRIST

## 2019-06-13 PROCEDURE — 71000013 ZZH RECOVERY PHASE 1 LEVEL 1 EA ADDTL HR: Performed by: PODIATRIST

## 2019-06-13 PROCEDURE — 36000052 ZZH SURGERY LEVEL 2 EA 15 ADDTL MIN: Performed by: PODIATRIST

## 2019-06-13 PROCEDURE — 80048 BASIC METABOLIC PNL TOTAL CA: CPT | Performed by: NURSE PRACTITIONER

## 2019-06-13 PROCEDURE — 87205 SMEAR GRAM STAIN: CPT | Performed by: PODIATRIST

## 2019-06-13 RX ORDER — PROPOFOL 10 MG/ML
INJECTION, EMULSION INTRAVENOUS CONTINUOUS PRN
Status: DISCONTINUED | OUTPATIENT
Start: 2019-06-13 | End: 2019-06-13

## 2019-06-13 RX ORDER — NALOXONE HYDROCHLORIDE 0.4 MG/ML
.1-.4 INJECTION, SOLUTION INTRAMUSCULAR; INTRAVENOUS; SUBCUTANEOUS
Status: DISCONTINUED | OUTPATIENT
Start: 2019-06-13 | End: 2019-06-13 | Stop reason: HOSPADM

## 2019-06-13 RX ORDER — BUPIVACAINE HYDROCHLORIDE 5 MG/ML
INJECTION, SOLUTION PERINEURAL PRN
Status: DISCONTINUED | OUTPATIENT
Start: 2019-06-13 | End: 2019-06-13 | Stop reason: HOSPADM

## 2019-06-13 RX ORDER — GABAPENTIN 300 MG/1
300 CAPSULE ORAL ONCE
Status: COMPLETED | OUTPATIENT
Start: 2019-06-13 | End: 2019-06-13

## 2019-06-13 RX ORDER — OXYCODONE HYDROCHLORIDE 5 MG/1
10 TABLET ORAL EVERY 4 HOURS PRN
Qty: 24 TABLET | Refills: 0 | Status: ON HOLD | OUTPATIENT
Start: 2019-06-13 | End: 2019-07-04

## 2019-06-13 RX ORDER — FENTANYL CITRATE 50 UG/ML
25-50 INJECTION, SOLUTION INTRAMUSCULAR; INTRAVENOUS
Status: DISCONTINUED | OUTPATIENT
Start: 2019-06-13 | End: 2019-06-13 | Stop reason: HOSPADM

## 2019-06-13 RX ORDER — SODIUM CHLORIDE, SODIUM LACTATE, POTASSIUM CHLORIDE, CALCIUM CHLORIDE 600; 310; 30; 20 MG/100ML; MG/100ML; MG/100ML; MG/100ML
INJECTION, SOLUTION INTRAVENOUS CONTINUOUS
Status: DISCONTINUED | OUTPATIENT
Start: 2019-06-13 | End: 2019-06-13 | Stop reason: HOSPADM

## 2019-06-13 RX ORDER — POTASSIUM CHLORIDE 1500 MG/1
20 TABLET, EXTENDED RELEASE ORAL DAILY
Qty: 30 TABLET | Refills: 0 | Status: SHIPPED | OUTPATIENT
Start: 2019-06-13 | End: 2019-07-03

## 2019-06-13 RX ORDER — METOCLOPRAMIDE HYDROCHLORIDE 5 MG/ML
10 INJECTION INTRAMUSCULAR; INTRAVENOUS EVERY 6 HOURS PRN
Status: DISCONTINUED | OUTPATIENT
Start: 2019-06-13 | End: 2019-06-13 | Stop reason: HOSPADM

## 2019-06-13 RX ORDER — METOCLOPRAMIDE 10 MG/1
10 TABLET ORAL EVERY 6 HOURS PRN
Status: DISCONTINUED | OUTPATIENT
Start: 2019-06-13 | End: 2019-06-13 | Stop reason: HOSPADM

## 2019-06-13 RX ORDER — PROPOFOL 10 MG/ML
INJECTION, EMULSION INTRAVENOUS PRN
Status: DISCONTINUED | OUTPATIENT
Start: 2019-06-13 | End: 2019-06-13

## 2019-06-13 RX ORDER — ALBUTEROL SULFATE 0.83 MG/ML
2.5 SOLUTION RESPIRATORY (INHALATION) EVERY 4 HOURS PRN
Status: DISCONTINUED | OUTPATIENT
Start: 2019-06-13 | End: 2019-06-13 | Stop reason: HOSPADM

## 2019-06-13 RX ORDER — FENTANYL CITRATE 50 UG/ML
INJECTION, SOLUTION INTRAMUSCULAR; INTRAVENOUS PRN
Status: DISCONTINUED | OUTPATIENT
Start: 2019-06-13 | End: 2019-06-13

## 2019-06-13 RX ORDER — ONDANSETRON 2 MG/ML
4 INJECTION INTRAMUSCULAR; INTRAVENOUS EVERY 30 MIN PRN
Status: DISCONTINUED | OUTPATIENT
Start: 2019-06-13 | End: 2019-06-13 | Stop reason: HOSPADM

## 2019-06-13 RX ORDER — LIDOCAINE 40 MG/G
CREAM TOPICAL
Status: DISCONTINUED | OUTPATIENT
Start: 2019-06-13 | End: 2019-06-13 | Stop reason: HOSPADM

## 2019-06-13 RX ORDER — LIDOCAINE HYDROCHLORIDE 10 MG/ML
INJECTION, SOLUTION EPIDURAL; INFILTRATION; INTRACAUDAL; PERINEURAL PRN
Status: DISCONTINUED | OUTPATIENT
Start: 2019-06-13 | End: 2019-06-13

## 2019-06-13 RX ORDER — MEPERIDINE HYDROCHLORIDE 25 MG/ML
12.5 INJECTION INTRAMUSCULAR; INTRAVENOUS; SUBCUTANEOUS
Status: DISCONTINUED | OUTPATIENT
Start: 2019-06-13 | End: 2019-06-13 | Stop reason: HOSPADM

## 2019-06-13 RX ORDER — GLYCOPYRROLATE 0.2 MG/ML
INJECTION, SOLUTION INTRAMUSCULAR; INTRAVENOUS PRN
Status: DISCONTINUED | OUTPATIENT
Start: 2019-06-13 | End: 2019-06-13

## 2019-06-13 RX ORDER — ONDANSETRON 4 MG/1
4 TABLET, ORALLY DISINTEGRATING ORAL EVERY 30 MIN PRN
Status: DISCONTINUED | OUTPATIENT
Start: 2019-06-13 | End: 2019-06-13 | Stop reason: HOSPADM

## 2019-06-13 RX ORDER — ROPIVACAINE HYDROCHLORIDE 5 MG/ML
INJECTION, SOLUTION EPIDURAL; INFILTRATION; PERINEURAL PRN
Status: DISCONTINUED | OUTPATIENT
Start: 2019-06-13 | End: 2019-06-13

## 2019-06-13 RX ORDER — LIDOCAINE HYDROCHLORIDE AND EPINEPHRINE BITARTRATE 20; .01 MG/ML; MG/ML
INJECTION, SOLUTION SUBCUTANEOUS PRN
Status: DISCONTINUED | OUTPATIENT
Start: 2019-06-13 | End: 2019-06-13

## 2019-06-13 RX ORDER — ACETAMINOPHEN 325 MG/1
975 TABLET ORAL ONCE
Status: COMPLETED | OUTPATIENT
Start: 2019-06-13 | End: 2019-06-13

## 2019-06-13 RX ORDER — DEXAMETHASONE SODIUM PHOSPHATE 4 MG/ML
INJECTION, SOLUTION INTRA-ARTICULAR; INTRALESIONAL; INTRAMUSCULAR; INTRAVENOUS; SOFT TISSUE PRN
Status: DISCONTINUED | OUTPATIENT
Start: 2019-06-13 | End: 2019-06-13

## 2019-06-13 RX ORDER — ONDANSETRON 2 MG/ML
INJECTION INTRAMUSCULAR; INTRAVENOUS PRN
Status: DISCONTINUED | OUTPATIENT
Start: 2019-06-13 | End: 2019-06-13

## 2019-06-13 RX ORDER — HYDROXYZINE HYDROCHLORIDE 25 MG/1
25 TABLET, FILM COATED ORAL EVERY 4 HOURS PRN
Qty: 24 TABLET | Refills: 0 | Status: SHIPPED | OUTPATIENT
Start: 2019-06-13 | End: 2019-07-03

## 2019-06-13 RX ORDER — KETOROLAC TROMETHAMINE 30 MG/ML
INJECTION, SOLUTION INTRAMUSCULAR; INTRAVENOUS PRN
Status: DISCONTINUED | OUTPATIENT
Start: 2019-06-13 | End: 2019-06-13

## 2019-06-13 RX ORDER — VANCOMYCIN HYDROCHLORIDE 1 G/20ML
INJECTION, POWDER, LYOPHILIZED, FOR SOLUTION INTRAVENOUS PRN
Status: DISCONTINUED | OUTPATIENT
Start: 2019-06-13 | End: 2019-06-13 | Stop reason: HOSPADM

## 2019-06-13 RX ADMIN — MIDAZOLAM HYDROCHLORIDE 3 MG: 1 INJECTION, SOLUTION INTRAMUSCULAR; INTRAVENOUS at 12:37

## 2019-06-13 RX ADMIN — ACETAMINOPHEN 975 MG: 325 TABLET, FILM COATED ORAL at 12:08

## 2019-06-13 RX ADMIN — GABAPENTIN 300 MG: 300 CAPSULE ORAL at 12:09

## 2019-06-13 RX ADMIN — MIDAZOLAM HYDROCHLORIDE 2 MG: 1 INJECTION, SOLUTION INTRAMUSCULAR; INTRAVENOUS at 14:01

## 2019-06-13 RX ADMIN — ROPIVACAINE HYDROCHLORIDE 30 ML: 5 INJECTION, SOLUTION EPIDURAL; INFILTRATION; PERINEURAL at 13:00

## 2019-06-13 RX ADMIN — SODIUM CHLORIDE, POTASSIUM CHLORIDE, SODIUM LACTATE AND CALCIUM CHLORIDE: 600; 310; 30; 20 INJECTION, SOLUTION INTRAVENOUS at 14:35

## 2019-06-13 RX ADMIN — DEXAMETHASONE SODIUM PHOSPHATE 3 MG: 4 INJECTION, SOLUTION INTRA-ARTICULAR; INTRALESIONAL; INTRAMUSCULAR; INTRAVENOUS; SOFT TISSUE at 13:00

## 2019-06-13 RX ADMIN — DEXAMETHASONE SODIUM PHOSPHATE 1 MG: 4 INJECTION, SOLUTION INTRA-ARTICULAR; INTRALESIONAL; INTRAMUSCULAR; INTRAVENOUS; SOFT TISSUE at 13:03

## 2019-06-13 RX ADMIN — FENTANYL CITRATE 100 MCG: 50 INJECTION, SOLUTION INTRAMUSCULAR; INTRAVENOUS at 14:03

## 2019-06-13 RX ADMIN — LIDOCAINE HYDROCHLORIDE 2 ML: 10 INJECTION, SOLUTION EPIDURAL; INFILTRATION; INTRACAUDAL; PERINEURAL at 12:56

## 2019-06-13 RX ADMIN — SODIUM CHLORIDE, POTASSIUM CHLORIDE, SODIUM LACTATE AND CALCIUM CHLORIDE: 600; 310; 30; 20 INJECTION, SOLUTION INTRAVENOUS at 12:08

## 2019-06-13 RX ADMIN — LIDOCAINE HYDROCHLORIDE 2 ML: 10 INJECTION, SOLUTION EPIDURAL; INFILTRATION; INTRACAUDAL; PERINEURAL at 13:02

## 2019-06-13 RX ADMIN — LIDOCAINE HYDROCHLORIDE 0.4 ML: 10 INJECTION, SOLUTION EPIDURAL; INFILTRATION; INTRACAUDAL; PERINEURAL at 12:09

## 2019-06-13 RX ADMIN — PROPOFOL 50 MG: 10 INJECTION, EMULSION INTRAVENOUS at 15:05

## 2019-06-13 RX ADMIN — FENTANYL CITRATE 50 MCG: 50 INJECTION, SOLUTION INTRAMUSCULAR; INTRAVENOUS at 12:37

## 2019-06-13 RX ADMIN — MIDAZOLAM HYDROCHLORIDE 2 MG: 1 INJECTION, SOLUTION INTRAMUSCULAR; INTRAVENOUS at 12:49

## 2019-06-13 RX ADMIN — FENTANYL CITRATE 50 MCG: 50 INJECTION, SOLUTION INTRAMUSCULAR; INTRAVENOUS at 12:57

## 2019-06-13 RX ADMIN — KETOROLAC TROMETHAMINE 30 MG: 30 INJECTION, SOLUTION INTRAMUSCULAR at 15:00

## 2019-06-13 RX ADMIN — ROPIVACAINE HYDROCHLORIDE 10 ML: 5 INJECTION, SOLUTION EPIDURAL; INFILTRATION; PERINEURAL at 13:03

## 2019-06-13 RX ADMIN — PROPOFOL 50 MG: 10 INJECTION, EMULSION INTRAVENOUS at 14:04

## 2019-06-13 RX ADMIN — ONDANSETRON 4 MG: 2 INJECTION INTRAMUSCULAR; INTRAVENOUS at 14:03

## 2019-06-13 RX ADMIN — MIDAZOLAM HYDROCHLORIDE 2 MG: 1 INJECTION, SOLUTION INTRAMUSCULAR; INTRAVENOUS at 14:02

## 2019-06-13 RX ADMIN — FENTANYL CITRATE 100 MCG: 50 INJECTION, SOLUTION INTRAMUSCULAR; INTRAVENOUS at 12:49

## 2019-06-13 RX ADMIN — VANCOMYCIN HYDROCHLORIDE 1500 MG: 1 INJECTION, POWDER, LYOPHILIZED, FOR SOLUTION INTRAVENOUS at 12:08

## 2019-06-13 RX ADMIN — Medication 5 ML: at 12:59

## 2019-06-13 RX ADMIN — PROPOFOL 100 MCG/KG/MIN: 10 INJECTION, EMULSION INTRAVENOUS at 14:23

## 2019-06-13 RX ADMIN — PROPOFOL 250 MG: 10 INJECTION, EMULSION INTRAVENOUS at 14:03

## 2019-06-13 RX ADMIN — GLYCOPYRROLATE 0.2 MG: 0.2 INJECTION, SOLUTION INTRAMUSCULAR; INTRAVENOUS at 14:04

## 2019-06-13 RX ADMIN — PROPOFOL 15 MG: 10 INJECTION, EMULSION INTRAVENOUS at 15:03

## 2019-06-13 RX ADMIN — FENTANYL CITRATE 50 MCG: 50 INJECTION, SOLUTION INTRAMUSCULAR; INTRAVENOUS at 13:58

## 2019-06-13 ASSESSMENT — LIFESTYLE VARIABLES: TOBACCO_USE: 1

## 2019-06-13 NOTE — OP NOTE
[facility] ORTHOPEDICS OPERATIVE REPORT  Operative Report - Orthopedics  Britni Tavarez,  1960, MRN [id number]    Surgery Date: 19    PCP: Chaka Bradford, [phone]   Code status:  No Order       OPERATION SITE:  [facility] Operating Room       OPERATIVE REPORT  DR. NIKITA FAN  FOOT & ANKLE SURGEON  [facility] ORTHOPEDICS    DATE OF PROCEDURE: 19    SITE: [facility] Operating Room    SURGEON: Dr. Nikita Fan - [facility]    ASSISTANT: Eugene Prakash, PGY 2-[facility]      Pre-Operative Diagnosis:  1.  Right ankle post prior arthrodesis, several years prior  2.  Retained deep hardware right ankle x4 screws  3.  Concern for deep potential infection, lateral margin  4.  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  Right ankle post prior arthrodesis, several years prior  2.  Retained deep hardware right ankle x4 screws  3.  Concern for deep potential infection, lateral margin  4.  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  Right ankle deep hardware removal x4 screws  2.  Right lateral fibular partial excision, debridement bone biopsy  3.  Intraprocedural fluoroscopic assistance  4.  Right subtalar joint debridement, partial excision talus calcaneal inferior lateral margins  5.  Talonavicular debridement, partial excision navicular and dorsal talar margin  6.  Posterior splint application below the knee ankle neutral, fiberglass material    Anesthesia: General with popliteal block performed under ultrasonic guidance with additional regional/local anesthetic  Hemostasis: Right-sided thigh tourniquet at 300 mmHg  EBL:  10 mL  Findings:  Right ankle post prior arthrodesis with stable, mature tibiotalar fusion with bone hypertrophy.  Lateral fibula was found to be quite soft with underlying  concern with the potential for bone infection.  Subcortical fibular onlay screw.  Otherwise, screws were readily obtainable about the lateral posterior fibula and the medial sinus Tarsi margin, lateral margin of the talus.  Compensatory osteophytes and scar tissue about the subtalar joint as well as the midtarsal joints primarily the talonavicular joint which benefited from decompression.  Implants: None.  4 Synthes screws, medium large fragment sent on with patient, removed in total.  Specimens: Bone sent to microbiology and pathology for further evaluation.    Indications for the Operation:  The patient has been seen and evaluated in clinic for the above-mentioned diagnoses.  They have failed to respond to nonoperative care measures and/or surgical care for condition was indicated.  They have elected to proceed as recommended/indicated with surgical care after a thorough discussion of the associated pros, cons, risks and benefits of the operations as well as the postoperative course and details.  All associated questions were answered.  Verbal and written form informed consent was obtained.  Please see additional information within the clinical notes.    Description of the Procedure:  Patient was seen and evaluated in the preoperative holding area.  The surgical site was marked.  The consent was signed.  The H&P was updated/reviewed.  Patient was transported from the preoperative holding area to the surgical suite.  The patient was placed on the operative table.  Anesthesia was obtained.  Antibiotics were administered via IV.  Tourniquet was applied.  The operative extremity was prepped and draped sterilely.  Then, a timeout was performed to identify the proper patient, surgical site and the procedures to be performed.  Local anesthetic was infiltrated about the operative margins for regional blockade utilizing a one-to-one mixture of 2% lidocaine plain and 0.5% Marcaine plain approximately 10 cc of the mixture  was utilized.  The foot/ankle was exsanguinated, and the tourniquet was inflated.    Attention was then directed to the right ankle.  Following the old incisional line and inferior fibular hockey-stick incision was made to expose the lateral and medial inferior hardware.  Layer dissection was conducted with neurovascular identification and retraction.  The fibular onlay strut was identified.  The large screw head for the 6.5 screw but the posterior lateral fibula was identified.  With partial fibular excision and bone debridement the screw was able to be removed in total it was intact and noncompromised.  Then, dissection down to the sinus Tarsi lateral subtalar joint was conducted.  The 2 ankle compression screws about the lateral inferior talus were identified.  One is found to be subcortical this was debrided out with partial excision of the talus with a mallet and osteotome.  These two 6.5 screws were also able to be removed in total.  The lateral fibular onlay strut screw was found to be subcortical.  Upon debridement partial excision of the fibula he was found to be somewhat cystic and soft.  Secondary to this the bone was sent to pathology and microbiology for further assessment.  Then, the site was thoroughly irrigated.  The lateral and inferior subtalar joint was debrided of overhanging talar bone, fibular bone and calcaneal margins.  The dense scar tissue in the area was debrided freeing up subtalar motion to the 5 degrees eversion and 5 degrees inversion, improved from prior.  Lateral side was irrigated thoroughly.  Vancomycin powder packed in and around after debridement of the screw sites.  Then layered closure completed with 2-0 Vicryl, 3-0 Vicryl and 3-0 nylon.  Dorsal medial incision at the level of the talonavicular joint following the old incision line was made with a #15 blade.  Layer dissection down to the joint was conducted.  See tenotomy of the tibialis anterior was conducted to allow direct  access given this incisional approach.  The talar navicular joint was opened up the dorsal medial aspect.  Osteophytes about the distal anterior talus and proximal dorsal navicular were removed with rondure mallet osteotome to improve dorsiflexion to available 10 to 15 degrees on the operative table.  Site was thoroughly irrigated.  Tibialis anterior reapproximated with interlocking interwoven 2-0 Vicryl.  Layered closure then completed with additional 2-0 Vicryl and 3-0 nylon.    A compressive sterile splint/dressing was applied.  Vascular status was intact after deflation of the tourniquet.  SPLINT: A posterior fiberglass, below the knee splint was applied with the ankle in the neutral position.  COMPLICATIONS: No direct complications encountered throughout the case.    The patient tolerated the procedure & anesthesia well.  They were transported from the operative suite to the postoperative holding area.  The patient was given postoperative orders as well as specific postoperative instructions which were reviewed by the nursing staff.  Orders were placed for weightbearing status/activity, postoperative oral pain management, DVT prophylaxis measures both with mechanical and medicinal measures reviewed.  Splint/dressing care measures were reviewed as well as appropriate cryotherapy measures and nutrition.  Postoperative follow-up to be conducted in the next 10-14 days for outpatient clinical follow-up in the Orthopedic clinic at [facility].  If concerns or questions arise or develop they will contact our clinic and postoperative contact numbers provided.  Case details and post-operative care requirements reviewed with family/support present today.  Additionally, a detailed postoperative instruction sheet was provided to the patient and family.  All additional questions were answered postoperatively.    Please note that this report was completed with the assistance of voice recognition and transcription  services.  Although every effort has been made to correct and avoid errors, errors may remain.    Dr. Nikita Escobedo, MARIBEL, [facility] Cherry HillFAS  Foot & Ankle Surgeon/Specialist  [facility]          CC: [facility], Dr. Escobedo's Clinical Team

## 2019-06-13 NOTE — BRIEF OP NOTE
[facility] OR   Brief Operative Note    Pre-operative diagnosis: right ankle post fusion,subtalar and talonavicular arthritis   Post-operative diagnosis * No post-op diagnosis entered *   Procedure: Procedure(s):  RIGHT ANKLE: DEEP HARDWARE REMOVAL; SUBTALAR AND MIDTARSAL JOINT DEBRIDEMENTS   Surgeon: Nikita Escobedo DPM   Anesthesia: Combined General with Popliteal Block    Estimated blood loss: Less than 10 ml   Blood transfusion: No transfusion was given during surgery   Drains: None   Specimens: None   Findings:  Right ankle post prior arthrodesis with stable, mature tibiotalar fusion with bone hypertrophy.  Lateral fibula was found to be quite soft with underlying concern with the potential for bone infection.  Subcortical fibular onlay screw.  Otherwise, screws were readily obtainable about the lateral posterior fibula and the medial sinus Tarsi margin, lateral margin of the talus.  Compensatory osteophytes and scar tissue about the subtalar joint as well as the midtarsal joints primarily the talonavicular joint which benefited from decompression.   Complications: None   Condition: Stable   Comments: See dictated operative report for full details.           Nikita Escobedo DPM, FACFAS  Foot & Ankle Surgeon/Specialist  [facility]

## 2019-06-13 NOTE — DISCHARGE INSTRUCTIONS
Same Day Surgery Discharge Instructions  Special Precautions After Surgery - Adult    1. It is not unusual to feel lightheaded or faint, up to 24 hours after surgery or while taking pain medication.  If you have these symptoms; sit for a few minutes before standing and have someone assist you when getting up.  2. You should rest and relax for the next 24 hours and must have someone stay with you for at least 24 hours after your discharge.  3. DO NOT DRIVE any vehicle or operate mechanical equipment for 24 hours following the end of your surgery.  DO NOT DRIVE while taking narcotic pain medications that have been prescribed by your physician.  If you had a limb operated on, you must be able to use it fully to drive.  4. DO NOT drink alcoholic beverages for 24 hours following surgery or while taking prescription pain medication.  5. Drink clear liquids (apple juice, ginger ale, broth, 7-Up, etc.).  Progress to your regular diet as you feel able.  6. Any questions call your physician and do not make important decisions for 24 hours.    __________________________________________________________________________________________________________________________________  IMPORTANT NUMBERS:    [facility] Main Number:  [phone], [phone]  Pharmacy:  [phone]  Same Day Surgery:  [phone], Monday - Friday until 8:30 p.m.  Urgent Care:  [phone]  Emergency Room:  [phone]                                                                                 [facility] Orthopedics:  [phone]  --  Dr. Escobedo

## 2019-06-13 NOTE — ANESTHESIA POSTPROCEDURE EVALUATION
Patient: Britni Taavrez    Procedure(s):  RIGHT ANKLE: DEEP HARDWARE REMOVAL; SUBTALAR AND MIDTARSAL JOINT DEBRIDEMENTS    Diagnosis:right ankle post fusion,subtalar and talonavicular arthritis  Diagnosis Additional Information: No value filed.    Anesthesia Type:  General, ETT, LMA    Note:  Anesthesia Post Evaluation    Patient location during evaluation: Bedside  Patient participation: Able to fully participate in evaluation  Level of consciousness: awake  Pain management: adequate  Airway patency: patent  Cardiovascular status: acceptable  Respiratory status: acceptable  Hydration status: stable  PONV: none     Anesthetic complications: None          Last vitals:  Vitals:    06/13/19 1545 06/13/19 1600 06/13/19 1608   BP: 117/77 115/65    Pulse: 80 71    Resp: 18 25 20   Temp:      SpO2: 94% 100% 94%         Electronically Signed By: JOSE Sparks CRNA  June 13, 2019  4:58 PM

## 2019-06-13 NOTE — ANESTHESIA PROCEDURE NOTES
Peripheral nerve/Neuraxial procedure note : Saphenous and Popliteal  Pre-Procedure  Performed by  Jacqueline Slaughter   Location: pre-op    Procedure Times:6/13/2019 12:48 PM and 6/13/2019 1:09 PM  Pre-Anesthestic Checklist: patient identified, IV checked, site marked, risks and benefits discussed, informed consent, monitors and equipment checked, pre-op evaluation, at physician/surgeon's request and post-op pain management    Timeout  Correct Patient: Yes   Correct Procedure: Yes   Correct Site: Yes   Correct Laterality: Yes   Correct Position: Yes   Site Marked: Yes   .   Procedure Documentation    Diagnosis:PAIN.    Procedure:  right  Saphenous and Popliteal.  Local skin infiltrated with mL of 1% lidocaine.     Ultrasound used to identify targeted nerve, plexus, or vascular marker and placed a needle adjacent to it., Ultrasound was used to visualize the spread of the anesthetic in close proximity to the above stated nerve. A permanent image is entered into the patient's record.  Patient Prep;chlorhexidine gluconate and isopropyl alcohol.  Nerve Stim: Initial Level 1 mA.  Lowest motor response 0.46 mA..  Needle: insulated Needle Gauge: 21.    Needle Length (Inches) 4  Insertion Method: Single Shot.       Assessment/Narrative  Paresthesias: No.  .  The placement was negative for: blood aspirated, painful injection and site bleeding.  Bolus given via needle..   Secured via.   Complications:. Test dose of 5 mL lidocaine 2% w/ 1:200,000 epinephrine at 12:59. .

## 2019-06-13 NOTE — ANESTHESIA PREPROCEDURE EVALUATION
Anesthesia Pre-Procedure Evaluation    Patient: Britni Tavarez   MRN: [id number] : 1960          Preoperative Diagnosis: right ankle post fusion,subtalar and talonavicular arthritis    Procedure(s):  RIGHT ANKLE: DEEP HARDWARE REMOVAL; SUBTALAR AND MIDTARSAL JOINT DEBRIDEMENTS    Past Medical History:   Diagnosis Date     Back pain      Cellulitis      Chronic pain      Depressive disorder      Elevated cholesterol      Fibromyalgia      Hypertension      Hypothyroid      Migraines      Osteopenia      Recurrent sinus infections      Spinal enthesopathy (H)      Past Surgical History:   Procedure Laterality Date     HYSTERECTOMY       TYMPANOPLASTY         Anesthesia Evaluation     .             ROS/MED HX    ENT/Pulmonary:     (+)tobacco use, , . .    Neurologic:     (+)migraines,     Cardiovascular:     (+) hypertension----. : . . PULLIAM, . :. .       METS/Exercise Tolerance:  4 - Raking leaves, gardening   Hematologic:         Musculoskeletal:   (+)  other musculoskeletal- fibromyalia       GI/Hepatic:         Renal/Genitourinary:  - ROS Renal section negative       Endo:     (+) thyroid problem hypothyroidism, Obesity, .      Psychiatric:     (+) psychiatric history anxiety      Infectious Disease:  - neg infectious disease ROS       Malignancy:      - no malignancy   Other:    (+) No chance of pregnancy                         Physical Exam  Normal systems: cardiovascular, pulmonary and dental    Airway   Mallampati: II  TM distance: >3 FB  Neck ROM: full    Dental     Cardiovascular   Rhythm and rate: regular and normal      Pulmonary    breath sounds clear to auscultation            Lab Results   Component Value Date    WBC 12.1 (H) 2019    HGB 14.3 2019    HCT 43.7 2019     2019    CRP 37.5 (H) 2019    SED 15 2019     2019    POTASSIUM 3.2 (L) 2019    CHLORIDE 104 2019    CO2 29 2019    BUN 8 2019    CR 1.03 2019  "    (H) 06/13/2019    ANNE MARIE 9.5 06/13/2019    ALBUMIN 3.9 06/06/2019    PROTTOTAL 8.1 06/06/2019    ALT 19 06/06/2019    AST 22 06/06/2019    ALKPHOS 98 06/06/2019    BILITOTAL 0.2 06/06/2019    TSH 1.00 06/06/2019       Preop Vitals  BP Readings from Last 3 Encounters:   06/13/19 107/64   06/11/19 134/66   06/06/19 132/74    Pulse Readings from Last 3 Encounters:   06/13/19 71   06/11/19 80   06/06/19 74      Resp Readings from Last 3 Encounters:   06/13/19 14   06/11/19 18   06/06/19 16    SpO2 Readings from Last 3 Encounters:   06/13/19 96%   06/11/19 (!) 87%   06/06/19 97%      Temp Readings from Last 1 Encounters:   06/13/19 37.1  C (98.8  F) (Oral)    Ht Readings from Last 1 Encounters:   06/11/19 1.562 m (5' 1.5\")      Wt Readings from Last 1 Encounters:   06/11/19 92 kg (202 lb 13.2 oz)    Estimated body mass index is 37.7 kg/m  as calculated from the following:    Height as of 6/11/19: 1.562 m (5' 1.5\").    Weight as of 6/11/19: 92 kg (202 lb 13.2 oz).       Anesthesia Plan      History & Physical Review  History and physical reviewed and following examination; no interval change.    ASA Status:  2 .    NPO Status:  > 8 hours    Plan for General, ETT and LMA with Intravenous induction. Maintenance will be Balanced.    PONV prophylaxis:  Ondansetron (or other 5HT-3) and Dexamethasone or Solumedrol  Additional equipment: Videolaryngoscope      Postoperative Care  Postoperative pain management:  IV analgesics, Oral pain medications, Multi-modal analgesia and Peripheral nerve block (Single Shot).      Consents  Anesthetic plan, risks, benefits and alternatives discussed with:  Patient..                 Jacqueline Slaughter  "

## 2019-06-14 NOTE — OR NURSING
Dr. Escobedo here to see patient.  Has moderate amount of bloody drainage soaking through dressing on right heel.  Dressing reinforced with ABD pads and Ace wrap by MD.

## 2019-06-18 LAB
BACTERIA SPEC CULT: NO GROWTH
Lab: NORMAL
SPECIMEN SOURCE: NORMAL

## 2019-06-20 LAB
BACTERIA SPEC CULT: NORMAL
Lab: NORMAL
SPECIMEN SOURCE: NORMAL

## 2019-07-03 ENCOUNTER — OFFICE VISIT (OUTPATIENT)
Dept: FAMILY MEDICINE | Facility: CLINIC | Age: 59
End: 2019-07-03
Payer: COMMERCIAL

## 2019-07-03 VITALS
HEIGHT: 62 IN | OXYGEN SATURATION: 96 % | HEART RATE: 75 BPM | WEIGHT: 191.3 LBS | RESPIRATION RATE: 16 BRPM | DIASTOLIC BLOOD PRESSURE: 74 MMHG | SYSTOLIC BLOOD PRESSURE: 136 MMHG | BODY MASS INDEX: 35.2 KG/M2 | TEMPERATURE: 97.6 F

## 2019-07-03 DIAGNOSIS — I10 BENIGN ESSENTIAL HYPERTENSION: Primary | ICD-10-CM

## 2019-07-03 DIAGNOSIS — E87.6 HYPOKALEMIA: ICD-10-CM

## 2019-07-03 DIAGNOSIS — L03.116 CELLULITIS OF LEFT LOWER EXTREMITY: ICD-10-CM

## 2019-07-03 DIAGNOSIS — Z48.02 ENCOUNTER FOR REMOVAL OF SUTURES: ICD-10-CM

## 2019-07-03 DIAGNOSIS — E03.9 HYPOTHYROIDISM (ACQUIRED): ICD-10-CM

## 2019-07-03 LAB
ANION GAP SERPL CALCULATED.3IONS-SCNC: 7 MMOL/L (ref 3–14)
BUN SERPL-MCNC: 11 MG/DL (ref 7–30)
CALCIUM SERPL-MCNC: 9.4 MG/DL (ref 8.5–10.1)
CHLORIDE SERPL-SCNC: 101 MMOL/L (ref 94–109)
CO2 SERPL-SCNC: 30 MMOL/L (ref 20–32)
CREAT SERPL-MCNC: 0.91 MG/DL (ref 0.52–1.04)
GFR SERPL CREATININE-BSD FRML MDRD: 69 ML/MIN/{1.73_M2}
GLUCOSE SERPL-MCNC: 116 MG/DL (ref 70–99)
POTASSIUM SERPL-SCNC: 3.1 MMOL/L (ref 3.4–5.3)
SODIUM SERPL-SCNC: 138 MMOL/L (ref 133–144)

## 2019-07-03 PROCEDURE — 80048 BASIC METABOLIC PNL TOTAL CA: CPT | Performed by: NURSE PRACTITIONER

## 2019-07-03 PROCEDURE — 99214 OFFICE O/P EST MOD 30 MIN: CPT | Performed by: NURSE PRACTITIONER

## 2019-07-03 PROCEDURE — 36415 COLL VENOUS BLD VENIPUNCTURE: CPT | Performed by: NURSE PRACTITIONER

## 2019-07-03 RX ORDER — POTASSIUM CHLORIDE 1500 MG/1
40 TABLET, EXTENDED RELEASE ORAL DAILY
Qty: 60 TABLET | Refills: 1 | Status: ON HOLD | OUTPATIENT
Start: 2019-07-03 | End: 2019-07-04

## 2019-07-03 RX ORDER — LEVOTHYROXINE SODIUM 112 UG/1
112 TABLET ORAL EVERY EVENING
Qty: 90 TABLET | Refills: 1 | Status: SHIPPED | OUTPATIENT
Start: 2019-07-03 | End: 2020-02-14

## 2019-07-03 RX ORDER — AMLODIPINE BESYLATE 10 MG/1
10 TABLET ORAL DAILY
Qty: 90 TABLET | Refills: 1 | Status: SHIPPED | OUTPATIENT
Start: 2019-07-03 | End: 2020-02-14

## 2019-07-03 RX ORDER — HYDROCHLOROTHIAZIDE 25 MG/1
25 TABLET ORAL EVERY EVENING
Qty: 90 TABLET | Refills: 1 | Status: ON HOLD | OUTPATIENT
Start: 2019-07-03 | End: 2019-07-04

## 2019-07-03 RX ORDER — LEVOFLOXACIN 500 MG/1
500 TABLET, FILM COATED ORAL DAILY
Qty: 5 TABLET | Refills: 0 | Status: ON HOLD | OUTPATIENT
Start: 2019-07-03 | End: 2019-07-27

## 2019-07-03 RX ORDER — PRAVASTATIN SODIUM 40 MG
40 TABLET ORAL DAILY
Qty: 90 TABLET | Refills: 1 | Status: SHIPPED | OUTPATIENT
Start: 2019-07-03 | End: 2020-02-14

## 2019-07-03 ASSESSMENT — MIFFLIN-ST. JEOR: SCORE: 1393.04

## 2019-07-03 NOTE — LETTER
[facility]  [address]  [place] [address]  Phone: [phone]      7/3/2019     Britni Tavarez  [address] [facility] [place] [address]      Dear Britni:    Thank you for allowing me to participate in your care. Your recent test results were reviewed and listed below.      Kidney function normal.   Potassium level slightly low, recommend to increase Potassium supplement to 40 MEQ and recheck Potassium level in 1 week (lab only)       JOSE Bailey CNP     Results for orders placed or performed in visit on 07/03/19   Basic metabolic panel   Result Value Ref Range    Sodium 138 133 - 144 mmol/L    Potassium 3.1 (L) 3.4 - 5.3 mmol/L    Chloride 101 94 - 109 mmol/L    Carbon Dioxide 30 20 - 32 mmol/L    Anion Gap 7 3 - 14 mmol/L    Glucose 116 (H) 70 - 99 mg/dL    Urea Nitrogen 11 7 - 30 mg/dL    Creatinine 0.91 0.52 - 1.04 mg/dL    GFR Estimate 69 >60 mL/min/[1.73_m2]    GFR Estimate If Black 80 >60 mL/min/[1.73_m2]    Calcium 9.4 8.5 - 10.1 mg/dL       Thank you for choosing [facility]. As a result, please continue with the treatment plan discussed in the office. Return as discussed or sooner if symptoms worsen or fail to improve.     If you have any further questions or concerns, please do not hesitate to contact us.      Sincerely,      Ella Zhang

## 2019-07-03 NOTE — PROGRESS NOTES
"Subjective     Britni Tavarez is a 59 year old female who presents to clinic today for the following health issues:    Chief Complaint   Patient presents with     Surgical Followup     RIGHT ANKLE: DEEP HARDWARE REMOVAL; SUBTALAR AND MIDTARSAL JOINT DEBRIDEMENTS, still has a few sutures left, would like these removed      Blood Draw     potassium      Refill Request     Pravastatin, Levothyroxine, Amlodipine, HCTZ      Musculoskeletal Problem       HPI     Recently had left ankle surgery, complains of edema, erythema around the surgical scar, needs sutures removal, she has appointment with ortho next week for follow up. Took Levaquin for 5 days with mild improvement in edema and erythema.     Reviewed and updated as needed this visit by Provider         Review of Systems   ROS COMP: Constitutional, HEENT, cardiovascular, pulmonary, gi and gu systems are negative, except as otherwise noted.      Objective    /74 (BP Location: Left arm, Patient Position: Sitting, Cuff Size: Adult Regular)   Pulse 75   Temp 97.6  F (36.4  C) (Tympanic)   Resp 16   Ht 1.562 m (5' 1.5\")   Wt 86.8 kg (191 lb 4.8 oz)   SpO2 96%   BMI 35.56 kg/m    Body mass index is 35.56 kg/m .  Physical Exam   GENERAL: healthy, alert and no distress  CV: regular rate and rhythm, normal S1 S2, no S3 or S4, no murmur, click or rub, no peripheral edema and peripheral pulses strong  MS: left lateral ankle surgical site with sutures, surrounding erythema and edema, feels tender and warm to touch.   PSYCH: mentation appears normal, affect normal/bright    Diagnostic Test Results:  Labs reviewed in Epic  Results for orders placed or performed in visit on 07/03/19   Basic metabolic panel   Result Value Ref Range    Sodium 138 133 - 144 mmol/L    Potassium 3.1 (L) 3.4 - 5.3 mmol/L    Chloride 101 94 - 109 mmol/L    Carbon Dioxide 30 20 - 32 mmol/L    Anion Gap 7 3 - 14 mmol/L    Glucose 116 (H) 70 - 99 mg/dL    Urea Nitrogen 11 7 - 30 mg/dL    " "Creatinine 0.91 0.52 - 1.04 mg/dL    GFR Estimate 69 >60 mL/min/[1.73_m2]    GFR Estimate If Black 80 >60 mL/min/[1.73_m2]    Calcium 9.4 8.5 - 10.1 mg/dL           Assessment & Plan     1. Benign essential hypertension  -stable, well controlled, refills provided   - Basic metabolic panel  - pravastatin (PRAVACHOL) 40 MG tablet; Take 1 tablet (40 mg) by mouth daily  Dispense: 90 tablet; Refill: 1  - amLODIPine (NORVASC) 10 MG tablet; Take 1 tablet (10 mg) by mouth daily (Patient taking differently: Take 10 mg by mouth every morning )  Dispense: 90 tablet; Refill: 1    2. Hypothyroidism (acquired)    - levothyroxine (SYNTHROID/LEVOTHROID) 112 MCG tablet; Take 1 tablet (112 mcg) by mouth every evening (Patient taking differently: Take 112 mcg by mouth every morning )  Dispense: 90 tablet; Refill: 1    3. Cellulitis of left lower extremity  - levofloxacin (LEVAQUIN) 500 MG tablet; Take 1 tablet (500 mg) by mouth daily  Dispense: 5 tablet; Refill: 0  -follow up with ortho  -states Levaquin is the only antibiotic that works well for her, had recurrent cellulitis in the past, if no improvement will consider ID consult   -7 sutures removed, patient tolerated procedure well, will follow up with ortho next week    4. Hypokalemia  -replace Potassium 40 MEQ daily and recheck level again in 5-7 days   - Potassium FUTURE anytime; Future     Tobacco Cessation:   reports that she has been smoking.  She has been smoking about 1.00 pack per day. She has never used smokeless tobacco.  Tobacco Cessation Action Plan: Information offered: Patient not interested at this time      BMI:   Estimated body mass index is 35.56 kg/m  as calculated from the following:    Height as of this encounter: 1.562 m (5' 1.5\").    Weight as of this encounter: 86.8 kg (191 lb 4.8 oz).   Weight management plan: Discussed healthy diet and exercise guidelines        See Patient Instructions    Return in about 1 week (around 7/10/2019) for Lab Work.    Ella RUEDA" JOSE Zhang [facility]

## 2019-07-04 ENCOUNTER — HOSPITAL ENCOUNTER (OUTPATIENT)
Facility: CLINIC | Age: 59
Setting detail: OBSERVATION
Discharge: HOME | End: 2019-07-04
Attending: EMERGENCY MEDICINE | Admitting: INTERNAL MEDICINE
Payer: COMMERCIAL

## 2019-07-04 ENCOUNTER — APPOINTMENT (OUTPATIENT)
Dept: GENERAL RADIOLOGY | Facility: CLINIC | Age: 59
End: 2019-07-04
Attending: EMERGENCY MEDICINE
Payer: COMMERCIAL

## 2019-07-04 VITALS
WEIGHT: 194 LBS | DIASTOLIC BLOOD PRESSURE: 65 MMHG | OXYGEN SATURATION: 90 % | SYSTOLIC BLOOD PRESSURE: 130 MMHG | HEART RATE: 71 BPM | RESPIRATION RATE: 20 BRPM | TEMPERATURE: 98.4 F | BODY MASS INDEX: 35.7 KG/M2 | HEIGHT: 62 IN

## 2019-07-04 DIAGNOSIS — E87.6 HYPOKALEMIA: ICD-10-CM
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
## 2019-07-04 DIAGNOSIS — T81.31XA POSTOPERATIVE WOUND DEHISCENCE, INITIAL ENCOUNTER: ICD-10-CM

## 2019-07-04 LAB
ANION GAP SERPL CALCULATED.3IONS-SCNC: 10 MMOL/L (ref 3–14)
ANION GAP SERPL CALCULATED.3IONS-SCNC: 6 MMOL/L (ref 3–14)
BASOPHILS # BLD AUTO: 0.1 10E9/L (ref 0–0.2)
BASOPHILS NFR BLD AUTO: 1 %
BUN SERPL-MCNC: 12 MG/DL (ref 7–30)
BUN SERPL-MCNC: 12 MG/DL (ref 7–30)
CALCIUM SERPL-MCNC: 8.9 MG/DL (ref 8.5–10.1)
CALCIUM SERPL-MCNC: 9 MG/DL (ref 8.5–10.1)
CHLORIDE SERPL-SCNC: 100 MMOL/L (ref 94–109)
CHLORIDE SERPL-SCNC: 103 MMOL/L (ref 94–109)
CO2 SERPL-SCNC: 26 MMOL/L (ref 20–32)
CO2 SERPL-SCNC: 31 MMOL/L (ref 20–32)
CREAT SERPL-MCNC: 0.97 MG/DL (ref 0.52–1.04)
CREAT SERPL-MCNC: 1.21 MG/DL (ref 0.52–1.04)
CRP SERPL-MCNC: 58.4 MG/L (ref 0–8)
CRP SERPL-MCNC: 71.2 MG/L (ref 0–8)
DIFFERENTIAL METHOD BLD: NORMAL
EOSINOPHIL # BLD AUTO: 0.1 10E9/L (ref 0–0.7)
EOSINOPHIL NFR BLD AUTO: 1 %
ERYTHROCYTE [DISTWIDTH] IN BLOOD BY AUTOMATED COUNT: 13.1 % (ref 10–15)
ERYTHROCYTE [DISTWIDTH] IN BLOOD BY AUTOMATED COUNT: 13.1 % (ref 10–15)
ERYTHROCYTE [SEDIMENTATION RATE] IN BLOOD BY WESTERGREN METHOD: 17 MM/H (ref 0–30)
GFR SERPL CREATININE-BSD FRML MDRD: 49 ML/MIN/{1.73_M2}
GFR SERPL CREATININE-BSD FRML MDRD: 65 ML/MIN/{1.73_M2}
GLUCOSE SERPL-MCNC: 102 MG/DL (ref 70–99)
GLUCOSE SERPL-MCNC: 150 MG/DL (ref 70–99)
HCT VFR BLD AUTO: 42 % (ref 35–47)
HCT VFR BLD AUTO: 42.7 % (ref 35–47)
HGB BLD-MCNC: 13.5 G/DL (ref 11.7–15.7)
HGB BLD-MCNC: 14 G/DL (ref 11.7–15.7)
IMM GRANULOCYTES # BLD: 0 10E9/L (ref 0–0.4)
IMM GRANULOCYTES NFR BLD: 0.4 %
LYMPHOCYTES # BLD AUTO: 2.7 10E9/L (ref 0.8–5.3)
LYMPHOCYTES NFR BLD AUTO: 24.2 %
MCH RBC QN AUTO: 28.8 PG (ref 26.5–33)
MCH RBC QN AUTO: 29.2 PG (ref 26.5–33)
MCHC RBC AUTO-ENTMCNC: 32.1 G/DL (ref 31.5–36.5)
MCHC RBC AUTO-ENTMCNC: 32.8 G/DL (ref 31.5–36.5)
MCV RBC AUTO: 89 FL (ref 78–100)
MCV RBC AUTO: 90 FL (ref 78–100)
MONOCYTES # BLD AUTO: 1.1 10E9/L (ref 0–1.3)
MONOCYTES NFR BLD AUTO: 10.1 %
NEUTROPHILS # BLD AUTO: 7 10E9/L (ref 1.6–8.3)
NEUTROPHILS NFR BLD AUTO: 63.3 %
NRBC # BLD AUTO: 0 10*3/UL
NRBC BLD AUTO-RTO: 0 /100
PLATELET # BLD AUTO: 327 10E9/L (ref 150–450)
PLATELET # BLD AUTO: 338 10E9/L (ref 150–450)
POTASSIUM SERPL-SCNC: 2.6 MMOL/L (ref 3.4–5.3)
POTASSIUM SERPL-SCNC: 2.9 MMOL/L (ref 3.4–5.3)
POTASSIUM SERPL-SCNC: 3.5 MMOL/L (ref 3.4–5.3)
RBC # BLD AUTO: 4.69 10E12/L (ref 3.8–5.2)
RBC # BLD AUTO: 4.8 10E12/L (ref 3.8–5.2)
SODIUM SERPL-SCNC: 136 MMOL/L (ref 133–144)
SODIUM SERPL-SCNC: 140 MMOL/L (ref 133–144)
WBC # BLD AUTO: 11 10E9/L (ref 4–11)
WBC # BLD AUTO: 8.5 10E9/L (ref 4–11)

## 2019-07-04 PROCEDURE — 93010 ELECTROCARDIOGRAM REPORT: CPT | Mod: Z6 | Performed by: EMERGENCY MEDICINE

## 2019-07-04 PROCEDURE — 73610 X-RAY EXAM OF ANKLE: CPT | Mod: RT

## 2019-07-04 PROCEDURE — 99235 HOSP IP/OBS SAME DATE MOD 70: CPT | Performed by: FAMILY MEDICINE

## 2019-07-04 PROCEDURE — 84132 ASSAY OF SERUM POTASSIUM: CPT | Mod: 91 | Performed by: FAMILY MEDICINE

## 2019-07-04 PROCEDURE — 36415 COLL VENOUS BLD VENIPUNCTURE: CPT | Performed by: FAMILY MEDICINE

## 2019-07-04 PROCEDURE — 85025 COMPLETE CBC W/AUTO DIFF WBC: CPT | Performed by: EMERGENCY MEDICINE

## 2019-07-04 PROCEDURE — 85652 RBC SED RATE AUTOMATED: CPT | Performed by: EMERGENCY MEDICINE

## 2019-07-04 PROCEDURE — 86140 C-REACTIVE PROTEIN: CPT | Mod: 91 | Performed by: FAMILY MEDICINE

## 2019-07-04 PROCEDURE — 80048 BASIC METABOLIC PNL TOTAL CA: CPT | Performed by: EMERGENCY MEDICINE

## 2019-07-04 PROCEDURE — 25000132 ZZH RX MED GY IP 250 OP 250 PS 637: Performed by: FAMILY MEDICINE

## 2019-07-04 PROCEDURE — 85027 COMPLETE CBC AUTOMATED: CPT | Mod: 91 | Performed by: FAMILY MEDICINE

## 2019-07-04 PROCEDURE — 93005 ELECTROCARDIOGRAM TRACING: CPT | Performed by: EMERGENCY MEDICINE

## 2019-07-04 PROCEDURE — 86140 C-REACTIVE PROTEIN: CPT | Performed by: EMERGENCY MEDICINE

## 2019-07-04 PROCEDURE — 99285 EMERGENCY DEPT VISIT HI MDM: CPT | Mod: 25 | Performed by: EMERGENCY MEDICINE

## 2019-07-04 PROCEDURE — 80048 BASIC METABOLIC PNL TOTAL CA: CPT | Mod: 59 | Performed by: FAMILY MEDICINE

## 2019-07-04 PROCEDURE — 25000132 ZZH RX MED GY IP 250 OP 250 PS 637: Performed by: EMERGENCY MEDICINE

## 2019-07-04 PROCEDURE — G0378 HOSPITAL OBSERVATION PER HR: HCPCS

## 2019-07-04 RX ORDER — OXYCODONE HYDROCHLORIDE 5 MG/1
10 TABLET ORAL ONCE
Status: COMPLETED | OUTPATIENT
Start: 2019-07-04 | End: 2019-07-04

## 2019-07-04 RX ORDER — LEVOFLOXACIN 500 MG/1
500 TABLET, FILM COATED ORAL DAILY
Status: DISCONTINUED | OUTPATIENT
Start: 2019-07-04 | End: 2019-07-04 | Stop reason: HOSPADM

## 2019-07-04 RX ORDER — PROCHLORPERAZINE 25 MG
25 SUPPOSITORY, RECTAL RECTAL EVERY 12 HOURS PRN
Status: DISCONTINUED | OUTPATIENT
Start: 2019-07-04 | End: 2019-07-04 | Stop reason: HOSPADM

## 2019-07-04 RX ORDER — POTASSIUM CHLORIDE 1500 MG/1
20 TABLET, EXTENDED RELEASE ORAL ONCE
Status: DISCONTINUED | OUTPATIENT
Start: 2019-07-04 | End: 2019-07-04

## 2019-07-04 RX ORDER — POTASSIUM CL/LIDO/0.9 % NACL 10MEQ/0.1L
10 INTRAVENOUS SOLUTION, PIGGYBACK (ML) INTRAVENOUS
Status: DISCONTINUED | OUTPATIENT
Start: 2019-07-04 | End: 2019-07-04 | Stop reason: HOSPADM

## 2019-07-04 RX ORDER — POTASSIUM CL/LIDO/0.9 % NACL 10MEQ/0.1L
10 INTRAVENOUS SOLUTION, PIGGYBACK (ML) INTRAVENOUS ONCE
Status: DISCONTINUED | OUTPATIENT
Start: 2019-07-04 | End: 2019-07-04 | Stop reason: HOSPADM

## 2019-07-04 RX ORDER — OXYCODONE HYDROCHLORIDE 5 MG/1
5-10 TABLET ORAL
Status: DISCONTINUED | OUTPATIENT
Start: 2019-07-04 | End: 2019-07-04 | Stop reason: HOSPADM

## 2019-07-04 RX ORDER — POTASSIUM CHLORIDE 1500 MG/1
40 TABLET, EXTENDED RELEASE ORAL DAILY
Status: DISCONTINUED | OUTPATIENT
Start: 2019-07-04 | End: 2019-07-04

## 2019-07-04 RX ORDER — LEVOCETIRIZINE DIHYDROCHLORIDE 5 MG/1
5 TABLET, FILM COATED ORAL EVERY EVENING
Status: DISCONTINUED | OUTPATIENT
Start: 2019-07-04 | End: 2019-07-04 | Stop reason: CLARIF

## 2019-07-04 RX ORDER — PROCHLORPERAZINE MALEATE 5 MG
10 TABLET ORAL EVERY 6 HOURS PRN
Status: DISCONTINUED | OUTPATIENT
Start: 2019-07-04 | End: 2019-07-04 | Stop reason: HOSPADM

## 2019-07-04 RX ORDER — POTASSIUM CHLORIDE 7.45 MG/ML
10 INJECTION INTRAVENOUS
Status: DISCONTINUED | OUTPATIENT
Start: 2019-07-04 | End: 2019-07-04 | Stop reason: HOSPADM

## 2019-07-04 RX ORDER — POTASSIUM CHLORIDE 29.8 MG/ML
20 INJECTION INTRAVENOUS
Status: DISCONTINUED | OUTPATIENT
Start: 2019-07-04 | End: 2019-07-04 | Stop reason: CLARIF

## 2019-07-04 RX ORDER — POTASSIUM CHLORIDE 1500 MG/1
20 TABLET, EXTENDED RELEASE ORAL ONCE
Status: COMPLETED | OUTPATIENT
Start: 2019-07-04 | End: 2019-07-04

## 2019-07-04 RX ORDER — ONDANSETRON 4 MG/1
4 TABLET, ORALLY DISINTEGRATING ORAL EVERY 6 HOURS PRN
Status: DISCONTINUED | OUTPATIENT
Start: 2019-07-04 | End: 2019-07-04 | Stop reason: HOSPADM

## 2019-07-04 RX ORDER — OXYCODONE HCL 10 MG/1
10 TABLET, FILM COATED, EXTENDED RELEASE ORAL EVERY MORNING
Status: DISCONTINUED | OUTPATIENT
Start: 2019-07-04 | End: 2019-07-04 | Stop reason: HOSPADM

## 2019-07-04 RX ORDER — PREGABALIN 100 MG/1
100 CAPSULE ORAL 3 TIMES DAILY
Status: DISCONTINUED | OUTPATIENT
Start: 2019-07-04 | End: 2019-07-04 | Stop reason: HOSPADM

## 2019-07-04 RX ORDER — OXYCODONE HYDROCHLORIDE 5 MG/1
10 TABLET ORAL EVERY 4 HOURS PRN
Status: DISCONTINUED | OUTPATIENT
Start: 2019-07-04 | End: 2019-07-04 | Stop reason: HOSPADM

## 2019-07-04 RX ORDER — POTASSIUM CHLORIDE 1500 MG/1
20-40 TABLET, EXTENDED RELEASE ORAL
Status: DISCONTINUED | OUTPATIENT
Start: 2019-07-04 | End: 2019-07-04 | Stop reason: HOSPADM

## 2019-07-04 RX ORDER — PRAVASTATIN SODIUM 20 MG
40 TABLET ORAL DAILY
Status: DISCONTINUED | OUTPATIENT
Start: 2019-07-04 | End: 2019-07-04 | Stop reason: HOSPADM

## 2019-07-04 RX ORDER — IMIPRAMINE HCL 25 MG
150 TABLET ORAL AT BEDTIME
Status: DISCONTINUED | OUTPATIENT
Start: 2019-07-04 | End: 2019-07-04 | Stop reason: HOSPADM

## 2019-07-04 RX ORDER — ONDANSETRON 2 MG/ML
4 INJECTION INTRAMUSCULAR; INTRAVENOUS EVERY 6 HOURS PRN
Status: DISCONTINUED | OUTPATIENT
Start: 2019-07-04 | End: 2019-07-04 | Stop reason: HOSPADM

## 2019-07-04 RX ORDER — AMLODIPINE BESYLATE 10 MG/1
10 TABLET ORAL EVERY EVENING
Status: DISCONTINUED | OUTPATIENT
Start: 2019-07-04 | End: 2019-07-04 | Stop reason: HOSPADM

## 2019-07-04 RX ORDER — LORATADINE 10 MG/1
10 TABLET ORAL DAILY
Status: DISCONTINUED | OUTPATIENT
Start: 2019-07-04 | End: 2019-07-04 | Stop reason: HOSPADM

## 2019-07-04 RX ORDER — POTASSIUM CHLORIDE 1500 MG/1
20 TABLET, EXTENDED RELEASE ORAL DAILY
Status: DISCONTINUED | OUTPATIENT
Start: 2019-07-05 | End: 2019-07-04 | Stop reason: HOSPADM

## 2019-07-04 RX ORDER — LEVOTHYROXINE SODIUM 112 UG/1
112 TABLET ORAL EVERY EVENING
Status: DISCONTINUED | OUTPATIENT
Start: 2019-07-04 | End: 2019-07-04 | Stop reason: HOSPADM

## 2019-07-04 RX ORDER — NALOXONE HYDROCHLORIDE 0.4 MG/ML
.1-.4 INJECTION, SOLUTION INTRAMUSCULAR; INTRAVENOUS; SUBCUTANEOUS
Status: DISCONTINUED | OUTPATIENT
Start: 2019-07-04 | End: 2019-07-04 | Stop reason: HOSPADM

## 2019-07-04 RX ORDER — POTASSIUM CHLORIDE 1500 MG/1
20 TABLET, EXTENDED RELEASE ORAL DAILY
Qty: 30 TABLET | Refills: 1 | Status: ON HOLD | OUTPATIENT
Start: 2019-07-04 | End: 2019-07-29

## 2019-07-04 RX ORDER — POTASSIUM CHLORIDE 1.5 G/1.58G
20-40 POWDER, FOR SOLUTION ORAL
Status: DISCONTINUED | OUTPATIENT
Start: 2019-07-04 | End: 2019-07-04 | Stop reason: HOSPADM

## 2019-07-04 RX ORDER — ACETAMINOPHEN 325 MG/1
650 TABLET ORAL EVERY 4 HOURS PRN
Status: DISCONTINUED | OUTPATIENT
Start: 2019-07-04 | End: 2019-07-04 | Stop reason: HOSPADM

## 2019-07-04 RX ADMIN — POTASSIUM CHLORIDE 20 MEQ: 1500 TABLET, EXTENDED RELEASE ORAL at 04:08

## 2019-07-04 RX ADMIN — PREGABALIN 100 MG: 100 CAPSULE ORAL at 14:00

## 2019-07-04 RX ADMIN — OXYCODONE HYDROCHLORIDE 10 MG: 5 TABLET ORAL at 12:13

## 2019-07-04 RX ADMIN — OXYCODONE HYDROCHLORIDE 10 MG: 5 TABLET ORAL at 09:06

## 2019-07-04 RX ADMIN — OXYCODONE HYDROCHLORIDE 10 MG: 10 TABLET, FILM COATED, EXTENDED RELEASE ORAL at 08:52

## 2019-07-04 RX ADMIN — ASPIRIN 325 MG: 325 TABLET, COATED ORAL at 08:52

## 2019-07-04 RX ADMIN — POTASSIUM CHLORIDE 40 MEQ: 1500 TABLET, EXTENDED RELEASE ORAL at 06:14

## 2019-07-04 RX ADMIN — PREGABALIN 100 MG: 100 CAPSULE ORAL at 08:51

## 2019-07-04 RX ADMIN — POTASSIUM CHLORIDE 40 MEQ: 1500 TABLET, EXTENDED RELEASE ORAL at 08:52

## 2019-07-04 RX ADMIN — LEVOTHYROXINE SODIUM 112 MCG: 0.11 TABLET ORAL at 08:57

## 2019-07-04 RX ADMIN — LEVOFLOXACIN 500 MG: 500 TABLET, FILM COATED ORAL at 08:57

## 2019-07-04 RX ADMIN — POTASSIUM CHLORIDE 40 MEQ: 1500 TABLET, EXTENDED RELEASE ORAL at 10:41

## 2019-07-04 RX ADMIN — OXYCODONE HYDROCHLORIDE 10 MG: 5 TABLET ORAL at 06:14

## 2019-07-04 RX ADMIN — AMLODIPINE BESYLATE 10 MG: 10 TABLET ORAL at 08:57

## 2019-07-04 RX ADMIN — OXYCODONE HYDROCHLORIDE 10 MG: 5 TABLET ORAL at 02:07

## 2019-07-04 ASSESSMENT — MIFFLIN-ST. JEOR: SCORE: 1413.23

## 2019-07-04 NOTE — H&P
[facility] History and Physical    Britni Tavarez MRN# [id number]   Age: 59 year old YOB: 1960     Date of Admission:  7/4/2019      Primary care provider: Chaka Bradford          Assessment and Plan:   Assessment & Plan     Hypokalemia - suspect due to hydrochlorothiazide    7/4/2019 -- suspect this is due to her hydrochlorothiazide - that was started about 2 months ago and patient reports having had ongoing trouble with hypokalemia especially over the past month - got down to the low 1's one month ago, lowest we have on file is 2.5.  Has been taking a potassium supplement 20 meq daily over the past month.  Replacing potassium and stopping hydrochlorothiazide with plan to remain off this.  If potassium normalizes could discharge home later today with plan to continue 20 meq daily and recheck potassium at follow-up with primary care provider likely on Monday.      History of right ankle hardware removal 3 weeks ago for suspected infected hardware, now with wound dehiscence after sutures removed in primary care provider clinic yesterday   7/4/2019 -- patient seen by orthopedics today who agreed that there is no evidence of clear/concerning infection on exam today, however agree with continuing levaquin given complex history and high risk for infection in history.  Follow-up with Dr. Kwon - recommend calling his office tomorrow to schedule a follow-up sooner than 2 weeks from now as currently planned.          Benign essential hypertension  7/4/2019 -- blood pressure stable at 130 systolic, continue home amlodipine, stopping home hydrochlorothiazide as above.         Hypercholesteremia  Continue pravastatin      Fibromyalgia/Depression  7/4/2019 -- appears baseline regarding pain with stable mood and no suicidal ideation -continue home medications including pain oxycodone/oxycontin, amitriptyline, lyrca and zanafelx.        Hypothyroidism (acquired)   7/4/2019 -- TSH normal 1  "month ago, continue home levothyroxine       Tobacco abuse  7/4/2019 -- smokes 3/4 ppd, patient did not want patches or gum/lozenge or assistance with quitting.  Advised that quitting would help with wound healing.         Prophylaxis  Low risk, ambulate    Lines  PIV       Disposition   hope for discharge later today or tomorrow depending on how quickly her potassium corrects.                Chief Complaint:   Ankle wound dehiscence, hypokalemia  History is obtained from the patient          History of Present Illness:   This patient is a 59 year old  female with a significant past medical history of fibromyalgia, depression, hypothyroidism and recent ankle surgery 3 weeks ago for removal of suspected hardware who presents with concerns that her ankle wound has opened and isn't healing.  Has had ongoing pain in the ankle but by report to me says it's been doing \"ok\" and that she's been able to ambulate well although it's sore with walking on it.  No fever or chills.  She was seen by her primary care provider and started on levaquin yesterday and had some stitches removed. Late last night the wound opened some and she's had some serous drainage since that time.  No increase in redness or swelling no purulent drainage.  Has felt a bit more run down past few days but says that feels better already this AM after IVF and initial potassium replacement in ER last nigh.      Smokes 3/4 ppd  No alcohol or illicit drug use.               Past Medical History:   I have reviewed this patient's past medical history.  Patient Active Problem List    Diagnosis Date Noted     Hypokalemia 07/04/2019     Priority: Medium     Recurrent sinus infections 06/06/2019     Priority: Medium     SNHL (sensorineural hearing loss) 04/17/2017     Priority: Medium     Tinnitus, bilateral 04/17/2017     Priority: Medium     Benign essential hypertension 04/17/2017     Priority: Medium     Hypercholesteremia 04/17/2017     Priority: Medium "     Fibromyalgia 04/17/2017     Priority: Medium     History of tympanoplasty of right ear 04/17/2017     Priority: Medium     Depression 10/13/2016     Priority: Medium     Midline low back pain without sciatica 06/09/2015     Priority: Medium     Spinal enthesopathy (H) 01/20/2015     Priority: Medium     Pain medication agreement 12/06/2011     Priority: Medium     Overview:   Signed 12/6/11 - OK for #75 Norco (Hydrocodone/Acetaminophen 5/325 mg) per month.  Referral to Pain Clinic made in July 2016, patient says she has an appointment October 2016. In 12/2016 Patient says she went to a pain clinic and they wanted her to come off her blood pressure meds and continue Norco (no record of that visit have been received).  her  checked 3/10/2017 showing compliance with her agreement.  Last urine drug screen 9/2016 shows no unexpected findings.       Hypothyroidism (acquired) 12/13/2005     Priority: Medium     Migraine headache 12/13/2005     Priority: Medium     Overview:   Was on propranolol for prevention but caused falls  Also takes imipramine.  100 mg each night, consider decreasing as HA less frequent as of 12/2016  Neurology referral as of September 2016-didn't go because HA less frequent.       Osteopenia 12/13/2005     Priority: Medium     Overview:   Last DEXA in 2012 (stable), repeat in 2014--Declined in 2014.  Normal vitamin D level x 2.                Past Surgical History:   I have reviewed this patient's past surgical history   Past Surgical History:   Procedure Laterality Date     HYSTERECTOMY       REMOVE HARDWARE ANKLE Right 6/13/2019    Procedure: RIGHT ANKLE: DEEP HARDWARE REMOVAL; SUBTALAR AND MIDTARSAL JOINT DEBRIDEMENTS;  Surgeon: Nikita Escobedo DPM;  Location: [facility] OR     TYMPANOPLASTY               Social History:   I have reviewed this patient's social history   Social History     Tobacco Use     Smoking status: Current Every Day Smoker     Packs/day: 1.00     Smokeless tobacco: Never  Used   Substance Use Topics     Alcohol use: Not Currently     Frequency: Never             Family History:   I have reviewed this patient's family history  Family History   Problem Relation Age of Onset     Cancer Father         Lung              Immunizations:   Immunizations are current          Allergies:     Allergies   Allergen Reactions     Atorvastatin GI Disturbance     Augmentin Hives     Cortisone Other (See Comments)     IM only     Flagyl [Metronidazole] Nausea     Ibuprofen Other (See Comments)     Increased bleeding     Morphine Hives     Propranolol Other (See Comments)     Low blood pressure and fainting          Simvastatin Muscle Pain (Myalgia)     Sulfa Drugs Hives     Valtrex [Valacyclovir] Hives     Penicillins Rash     Prednisone Rash             Medications:     Medications Prior to Admission   Medication Sig Dispense Refill Last Dose     amitriptyline (ELAVIL) 25 MG tablet Take 75 mg by mouth every morning   7/3/2019 at Unknown time     amLODIPine (NORVASC) 10 MG tablet Take 1 tablet (10 mg) by mouth daily 90 tablet 1 7/3/2019 at Unknown time     aspirin (ASA) 325 MG EC tablet Take 1 tablet (325 mg) by mouth daily 30 tablet 0 7/3/2019 at Unknown time     hydrochlorothiazide (HYDRODIURIL) 25 MG tablet Take 1 tablet (25 mg) by mouth every evening 90 tablet 1 7/3/2019 at Unknown time     levofloxacin (LEVAQUIN) 500 MG tablet Take 1 tablet (500 mg) by mouth daily 5 tablet 0 7/3/2019 at Unknown time     levothyroxine (SYNTHROID/LEVOTHROID) 112 MCG tablet Take 1 tablet (112 mcg) by mouth every evening 90 tablet 1 7/3/2019 at Unknown time     oxyCODONE-acetaminophen (PERCOCET) 5-325 MG per tablet Take 1 tablet by mouth every 8 hours as needed for moderate to severe pain   7/4/2019 at Unknown time     potassium chloride ER (K-DUR/KLOR-CON M) 20 MEQ CR tablet Take 2 tablets (40 mEq) by mouth daily 60 tablet 1 7/3/2019 at Unknown time     pravastatin (PRAVACHOL) 40 MG tablet Take 1 tablet (40 mg) by  "mouth daily 90 tablet 1 7/3/2019 at Unknown time     AMLODIPINE BESYLATE PO Take 10 mg by mouth every evening    Taking     COCONUT OIL PO Take 500 mg by mouth 2 times daily   Taking     CRANBERRY PO Take 1 capsule by mouth every evening    Taking     diclofenac (VOLTAREN) 1 % topical gel Place onto the skin every 4 hours As needed   Taking     fish oil-omega-3 fatty acids 1000 MG capsule Take 1 g by mouth every evening    Taking     glucosamine-chondroitin 500-400 MG CAPS per capsule Take 1 capsule by mouth 2 times daily    Taking     imipramine (TOFRANIL) 50 MG tablet Take 150 mg by mouth At Bedtime    Taking     Levocetirizine Dihydrochloride (XYZAL PO) Take 5 mg by mouth every evening   More than a month at Unknown time     Multiple Vitamins-Minerals (MULTIVITAMIN ADULT PO) Take 1 tablet by mouth daily    Taking     oxyCODONE (OXYCONTIN) 10 MG 12 hr tablet Take 10 mg by mouth every morning   Taking     [] oxyCODONE (ROXICODONE) 5 MG tablet Take 2 tablets (10 mg) by mouth every 4 hours as needed for pain or severe pain 24 tablet 0      [] potassium chloride ER (K-DUR/KLOR-CON M) 10 MEQ CR tablet Take 2 tablets (20 mEq) by mouth 2 times daily for 5 days 6 tablet 0 2019 at 0630     pregabalin (LYRICA) 100 MG capsule Take 100 mg by mouth 3 times daily 0700, 1 pm, 7 pm   Taking     tiZANidine (ZANAFLEX) 4 MG tablet Take 8 mg by mouth At Bedtime    Taking     Turmeric, Curcuma Longa, (TURMERIC ROOT) POWD Take 1 capsule by mouth every evening    Taking     VITAMIN D, CHOLECALCIFEROL, PO Take 2,000 Units by mouth every evening    Taking             Review of Systems:    ROS: 10 point ROS neg other than the symptoms noted above in the HPI.             Physical Exam:   Blood pressure 131/74, pulse 90, temperature 98.3  F (36.8  C), temperature source Oral, resp. rate 20, height 1.575 m (5' 2\"), weight 88 kg (194 lb), SpO2 94 %.  Temperatures:  Current - Temp: 98.3  F (36.8  C); Max - Temp  Av.9  F " (36.6  C)  Min: 97.6  F (36.4  C)  Max: 98.3  F (36.8  C)  Respiration range: Resp  Av.7  Min: 16  Max: 20  Pulse range: Pulse  Av.5  Min: 75  Max: 90  Blood pressure range: Systolic (24hrs), Av , Min:131 , Max:136   ; Diastolic (24hrs), Av, Min:73, Max:74    Pulse oximetry range: SpO2  Av.7 %  Min: 94 %  Max: 97 %  No intake or output data in the 24 hours ending 19 0736  EXAM:  General: awake and alert, NAD, oriented x 3  Head: normocephalic  Neck: unremarkable, no lymphadenopathy   HEENT: oropharynx pink and moist    Heart: Regular rate and rhythm, no murmurs, rubs, or gallops  Lungs: clear to auscultation bilaterally with good air movement throughout  Abdomen: soft, non-tender, no masses or organomegaly  Right ankle shows wound with superficial dehiscence of about 1 cm of distal wound, small serous drainage, no clear erythema, swelling or fluctuance.   Extremities: no edema in lower extremities   Skin unremarkable.             Data:     Results for orders placed or performed during the hospital encounter of 19 (from the past 24 hour(s))   CBC with platelets differential   Result Value Ref Range    WBC 11.0 4.0 - 11.0 10e9/L    RBC Count 4.80 3.8 - 5.2 10e12/L    Hemoglobin 14.0 11.7 - 15.7 g/dL    Hematocrit 42.7 35.0 - 47.0 %    MCV 89 78 - 100 fl    MCH 29.2 26.5 - 33.0 pg    MCHC 32.8 31.5 - 36.5 g/dL    RDW 13.1 10.0 - 15.0 %    Platelet Count 338 150 - 450 10e9/L    Diff Method Automated Method     % Neutrophils 63.3 %    % Lymphocytes 24.2 %    % Monocytes 10.1 %    % Eosinophils 1.0 %    % Basophils 1.0 %    % Immature Granulocytes 0.4 %    Nucleated RBCs 0 0 /100    Absolute Neutrophil 7.0 1.6 - 8.3 10e9/L    Absolute Lymphocytes 2.7 0.8 - 5.3 10e9/L    Absolute Monocytes 1.1 0.0 - 1.3 10e9/L    Absolute Eosinophils 0.1 0.0 - 0.7 10e9/L    Absolute Basophils 0.1 0.0 - 0.2 10e9/L    Abs Immature Granulocytes 0.0 0 - 0.4 10e9/L    Absolute Nucleated RBC 0.0    Basic  metabolic panel   Result Value Ref Range    Sodium 136 133 - 144 mmol/L    Potassium 2.6 (LL) 3.4 - 5.3 mmol/L    Chloride 100 94 - 109 mmol/L    Carbon Dioxide 26 20 - 32 mmol/L    Anion Gap 10 3 - 14 mmol/L    Glucose 150 (H) 70 - 99 mg/dL    Urea Nitrogen 12 7 - 30 mg/dL    Creatinine 1.21 (H) 0.52 - 1.04 mg/dL    GFR Estimate 49 (L) >60 mL/min/[1.73_m2]    GFR Estimate If Black 57 (L) >60 mL/min/[1.73_m2]    Calcium 8.9 8.5 - 10.1 mg/dL   Erythrocyte sedimentation rate auto   Result Value Ref Range    Sed Rate 17 0 - 30 mm/h   CRP Inflammation   Result Value Ref Range    CRP Inflammation 58.4 (H) 0.0 - 8.0 mg/L   Ankle XR, G/E 3 views, right    Narrative    XR ANKLE RT G/E 3 VW  7/4/2019 2:35 AM     INDICATION: Pain, recent surgery for infected hardware.    COMPARISON: None.      Impression    IMPRESSION: Soft tissue swelling. No acute fracture. 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 Extensive postoperative and posttraumatic  changes of the ankle with tibiotalar fusion. No destructive bone  lesion.    KARTHIKEYAN HODGES MD           Attestation:  I have reviewed today's vital signs, notes, medications, labs and imaging.  Amount of time performed on this history and physical: 60 minutes.        Aníbal Reyes MD

## 2019-07-04 NOTE — ED PROVIDER NOTES
History     Chief Complaint   Patient presents with     Post-op Problem     Rt ankle surgery 06/13/ wound not healing     HPI  Britni Tavarez is a 59 year old female who is about 2.5 weeks status post right ankle surgery for removal of infected hardware and presents for wound dehiscence and pain of the ankle.  The pain is been getting worse over the past several days, pain is throbbing and aching, rated as severe, hurts to walk on it.  She is still on levofloxacin.  She has been taking oxycodone and OxyContin for the pain.  She denies any fevers.  No chest pain or difficulty breathing.  She was in clinic yesterday and had the stitches removed.  Late last night the wound opened up and started draining some fluid.  No increased redness or swelling.  No rash.  No abdominal pain or vomiting.    Allergies:  Allergies   Allergen Reactions     Atorvastatin GI Disturbance     Augmentin Hives     Cortisone Other (See Comments)     IM only     Flagyl [Metronidazole] Nausea     Ibuprofen Other (See Comments)     Increased bleeding     Morphine Hives     Propranolol Other (See Comments)     Low blood pressure and fainting          Simvastatin Muscle Pain (Myalgia)     Sulfa Drugs Hives     Valtrex [Valacyclovir] Hives     Penicillins Rash     Prednisone Rash       Problem List:    Patient Active Problem List    Diagnosis Date Noted     Hypokalemia 07/04/2019     Priority: Medium     Recurrent sinus infections 06/06/2019     Priority: Medium     SNHL (sensorineural hearing loss) 04/17/2017     Priority: Medium     Tinnitus, bilateral 04/17/2017     Priority: Medium     Benign essential hypertension 04/17/2017     Priority: Medium     Hypercholesteremia 04/17/2017     Priority: Medium     Fibromyalgia 04/17/2017     Priority: Medium     History of tympanoplasty of right ear 04/17/2017     Priority: Medium     Depression 10/13/2016     Priority: Medium     Midline low back pain without sciatica 06/09/2015     Priority:  Medium     Spinal enthesopathy (H) 01/20/2015     Priority: Medium     Pain medication agreement 12/06/2011     Priority: Medium     Overview:   Signed 12/6/11 - OK for #75 Norco (Hydrocodone/Acetaminophen 5/325 mg) per month.  Referral to Pain Clinic made in July 2016, patient says she has an appointment October 2016. In 12/2016 Patient says she went to a pain clinic and they wanted her to come off her blood pressure meds and continue Norco (no record of that visit have been received).  her  checked 3/10/2017 showing compliance with her agreement.  Last urine drug screen 9/2016 shows no unexpected findings.       Hypothyroidism (acquired) 12/13/2005     Priority: Medium     Migraine headache 12/13/2005     Priority: Medium     Overview:   Was on propranolol for prevention but caused falls  Also takes imipramine.  100 mg each night, consider decreasing as HA less frequent as of 12/2016  Neurology referral as of September 2016-didn't go because HA less frequent.       Osteopenia 12/13/2005     Priority: Medium     Overview:   Last DEXA in 2012 (stable), repeat in 2014--Declined in 2014.  Normal vitamin D level x 2.          Past Medical History:    Past Medical History:   Diagnosis Date     Back pain      Cellulitis      Chronic pain      Depressive disorder      Elevated cholesterol      Fibromyalgia      Hypertension      Hypothyroid      Migraines      Osteopenia      Recurrent sinus infections      Spinal enthesopathy (H)        Past Surgical History:    Past Surgical History:   Procedure Laterality Date     HYSTERECTOMY       REMOVE HARDWARE ANKLE Right 6/13/2019    Procedure: RIGHT ANKLE: DEEP HARDWARE REMOVAL; SUBTALAR AND MIDTARSAL JOINT DEBRIDEMENTS;  Surgeon: Nikita Escobedo DPM;  Location: [facility] OR     TYMPANOPLASTY         Family History:    Family History   Problem Relation Age of Onset     Cancer Father         Lung        Social History:  Marital Status:   [5]  Social History     Tobacco  "Use     Smoking status: Current Every Day Smoker     Packs/day: 1.00     Smokeless tobacco: Never Used   Substance Use Topics     Alcohol use: Not Currently     Frequency: Never     Drug use: Never        Medications:      No current outpatient medications on file.      Review of Systems  Pertinent positives and negatives listed in the HPI, all other systems reviewed and are negative.    Physical Exam   BP: 133/73  Heart Rate: 82  Temp: 97.9  F (36.6  C)  Resp: 20  Height: 157.5 cm (5' 2\")  Weight: 88 kg (194 lb)  SpO2: 97 %      Physical Exam   Constitutional: She is oriented to person, place, and time. She appears well-developed and well-nourished. She appears distressed.   HENT:   Head: Normocephalic and atraumatic.   Right Ear: External ear normal.   Left Ear: External ear normal.   Nose: Nose normal.   Eyes: Conjunctivae are normal. No scleral icterus.   Neck: Normal range of motion.   Cardiovascular: Normal rate and regular rhythm.   Pulmonary/Chest: Effort normal. No stridor. No respiratory distress.   Musculoskeletal:   Right ankle: Lateral surgical wound with a very minimal erythema around the edges and wound dehiscence at the inferior most portion of the wound.  No discharge or drainage.  No excess warmth.  No tenderness to palpation.   Neurological: She is alert and oriented to person, place, and time.   Skin: Skin is warm and dry. She is not diaphoretic.   Psychiatric: She has a normal mood and affect. Her behavior is normal.   Nursing note and vitals reviewed.      ED Course        Procedures               EKG Interpretation:      Interpreted by Vijay Jaramillo  Time reviewed: 0355  Symptoms at time of EKG: Hypokalemia   Rhythm: sinus   Rate: 70  Axis: Normal  Ectopy: none  Conduction: normal  ST Segments/ T Waves: U waves present V1, V2, V3 and V4  Q Waves: none  Comparison to prior: No old EKG available    Clinical Impression: Hypokalemia    Critical Care time:  none               Results for " orders placed or performed during the hospital encounter of 07/04/19 (from the past 24 hour(s))   CBC with platelets differential   Result Value Ref Range    WBC 11.0 4.0 - 11.0 10e9/L    RBC Count 4.80 3.8 - 5.2 10e12/L    Hemoglobin 14.0 11.7 - 15.7 g/dL    Hematocrit 42.7 35.0 - 47.0 %    MCV 89 78 - 100 fl    MCH 29.2 26.5 - 33.0 pg    MCHC 32.8 31.5 - 36.5 g/dL    RDW 13.1 10.0 - 15.0 %    Platelet Count 338 150 - 450 10e9/L    Diff Method Automated Method     % Neutrophils 63.3 %    % Lymphocytes 24.2 %    % Monocytes 10.1 %    % Eosinophils 1.0 %    % Basophils 1.0 %    % Immature Granulocytes 0.4 %    Nucleated RBCs 0 0 /100    Absolute Neutrophil 7.0 1.6 - 8.3 10e9/L    Absolute Lymphocytes 2.7 0.8 - 5.3 10e9/L    Absolute Monocytes 1.1 0.0 - 1.3 10e9/L    Absolute Eosinophils 0.1 0.0 - 0.7 10e9/L    Absolute Basophils 0.1 0.0 - 0.2 10e9/L    Abs Immature Granulocytes 0.0 0 - 0.4 10e9/L    Absolute Nucleated RBC 0.0    Basic metabolic panel   Result Value Ref Range    Sodium 136 133 - 144 mmol/L    Potassium 2.6 (LL) 3.4 - 5.3 mmol/L    Chloride 100 94 - 109 mmol/L    Carbon Dioxide 26 20 - 32 mmol/L    Anion Gap 10 3 - 14 mmol/L    Glucose 150 (H) 70 - 99 mg/dL    Urea Nitrogen 12 7 - 30 mg/dL    Creatinine 1.21 (H) 0.52 - 1.04 mg/dL    GFR Estimate 49 (L) >60 mL/min/[1.73_m2]    GFR Estimate If Black 57 (L) >60 mL/min/[1.73_m2]    Calcium 8.9 8.5 - 10.1 mg/dL   Erythrocyte sedimentation rate auto   Result Value Ref Range    Sed Rate 17 0 - 30 mm/h   CRP Inflammation   Result Value Ref Range    CRP Inflammation 58.4 (H) 0.0 - 8.0 mg/L   Ankle XR, G/E 3 views, right    Narrative    XR ANKLE RT G/E 3 VW  7/4/2019 2:35 AM     INDICATION: Pain, recent surgery for infected hardware.    COMPARISON: None.      Impression    IMPRESSION: Soft tissue swelling. No acute fracture. 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 Extensive postoperative and posttraumatic  changes of the ankle with tibiotalar fusion. No  destructive bone  lesion.    KARTHIKEYAN HODGES MD       Medications   potassium chloride 10 mEq in 100 mL intermittent infusion with 10 mg lidocaine (has no administration in time range)   acetaminophen (TYLENOL) tablet 650 mg (has no administration in time range)   naloxone (NARCAN) injection 0.1-0.4 mg (has no administration in time range)   oxyCODONE (ROXICODONE) tablet 5-10 mg (has no administration in time range)   ondansetron (ZOFRAN-ODT) ODT tab 4 mg (has no administration in time range)     Or   ondansetron (ZOFRAN) injection 4 mg (has no administration in time range)   prochlorperazine (COMPAZINE) injection 10 mg (has no administration in time range)     Or   prochlorperazine (COMPAZINE) tablet 10 mg (has no administration in time range)     Or   prochlorperazine (COMPAZINE) Suppository 25 mg (has no administration in time range)   potassium chloride ER (K-DUR/KLOR-CON M) CR tablet 20-40 mEq (has no administration in time range)   potassium chloride (KLOR-CON) Packet 20-40 mEq (has no administration in time range)   potassium chloride 10 mEq in 100 mL sterile water intermittent infusion (premix) (has no administration in time range)   potassium chloride 10 mEq in 100 mL intermittent infusion with 10 mg lidocaine (has no administration in time range)   oxyCODONE (ROXICODONE) tablet 10 mg (10 mg Oral Given 7/4/19 0207)   potassium chloride ER (K-DUR/KLOR-CON M) CR tablet 20 mEq (20 mEq Oral Given 7/4/19 0408)       Assessments & Plan (with Medical Decision Making)   59-year-old female who presents for right ankle pain several weeks after surgery for infected hardware.  Heart rate 82, temperature is 97.9  F, SPO2 is 97% on room air.  There is wound dehiscence of the lateral wound but no significant discharge or surrounding erythema.  No signs of wound infection or abscess.  Unlikely septic arthritis.  She is given oxycodone for the pain.  X-ray of the ankle obtained, images reviewed independently as well as  radiology read reviewed, no signs of bony destruction or acute injury.  The patient's electrolytes returned concerning for a potassium that is very low at 2.6.  The patient is given IV and oral replacement.  EKG shows sinus rhythm with U waves consistent with hypokalemia and very small T waves.  White blood cell count is normal.  CRP is mildly elevated.  ESR is normal.  The patient is concerned about infection within the wound but it does not appear to be infected at this time.  From the standpoint of the ankle, I believe that she warrants follow-up with surgery clinic and no further work-up at this time.  However considering the patient's hypokalemia with EKG changes, I believe that she warrants admission to the hospital for replacement and cardiac monitoring due to the increased risk of sudden cardiac death.  I discussed case with the on-call hospitalist, Dr. Adames who agrees to admit the patient to his service.    I have reviewed the nursing notes.    I have reviewed the findings, diagnosis, plan and need for follow up with the patient.          Medication List      ASK your doctor about these medications    oxyCODONE 5 MG tablet  Commonly known as:  ROXICODONE  10 mg, Oral, EVERY 4 HOURS PRN  Ask about: Should I take this medication?     potassium chloride ER 10 MEQ CR tablet  Commonly known as:  K-DUR/KLOR-CON M  20 mEq, Oral, 2 TIMES DAILY  Ask about: Should I take this medication?            Final diagnoses:   Hypokalemia   Postoperative wound dehiscence, initial encounter       7/4/2019   [facility] EMERGENCY DEPARTMENT     Vijay Jaramillo MD  07/04/19 0560

## 2019-07-04 NOTE — DISCHARGE SUMMARY
[facility] Discharge Summary    Britni Tavarez MRN# [id number]   Age: 59 year old YOB: 1960     Date of Admission:  7/4/2019  Date of Discharge::  7/4/2019  Admitting Physician:  Leandro Adames MD  Discharge Physician:  Aníbal Reyes MD, MD             Admission Diagnoses:   Hypokalemia [E87.6]  Postoperative wound dehiscence, initial encounter [T81.31XA]          Principle Discharge Diagnosis:         Hypokalemia - suspect due to hydrochlorothiazide        See hospital course for further active diagnoses addressed during this admission.            Procedures:   No procedures performed during this admission          Medications Prior to Admission:     Medications Prior to Admission   Medication Sig Dispense Refill Last Dose     amLODIPine (NORVASC) 10 MG tablet Take 1 tablet (10 mg) by mouth daily (Patient taking differently: Take 10 mg by mouth every morning ) 90 tablet 1 7/3/2019 at am     aspirin (ASA) 325 MG EC tablet Take 1 tablet (325 mg) by mouth daily 30 tablet 0 7/3/2019 at am     COCONUT OIL PO Take 500 mg by mouth 2 times daily   7/3/2019 at Unknown time     CRANBERRY PO Take 1 capsule by mouth every evening    7/3/2019 at Unknown time     diclofenac (VOLTAREN) 1 % topical gel Place onto the skin every 4 hours As needed   7/3/2019 at Unknown time     fish oil-omega-3 fatty acids 1000 MG capsule Take 1 g by mouth every evening    7/3/2019 at Unknown time     glucosamine-chondroitin 500-400 MG CAPS per capsule Take 1 capsule by mouth 2 times daily    7/3/2019 at Unknown time     hydrochlorothiazide (HYDRODIURIL) 25 MG tablet Take 1 tablet (25 mg) by mouth every evening 90 tablet 1 7/3/2019 at am     imipramine (TOFRANIL) 50 MG tablet Take 150 mg by mouth At Bedtime    7/3/2019 at hs     levofloxacin (LEVAQUIN) 500 MG tablet Take 1 tablet (500 mg) by mouth daily 5 tablet 0 7/3/2019 at Unknown time     levothyroxine (SYNTHROID/LEVOTHROID) 112 MCG tablet Take 1 tablet (112  mcg) by mouth every evening (Patient taking differently: Take 112 mcg by mouth every morning ) 90 tablet 1 7/3/2019 at am     Multiple Vitamins-Minerals (MULTIVITAMIN ADULT PO) Take 1 tablet by mouth daily    7/3/2019 at Unknown time     oxyCODONE (OXYCONTIN) 10 MG 12 hr tablet Take 10 mg by mouth At Bedtime    7/3/2019 at hs     oxyCODONE-acetaminophen (PERCOCET) 5-325 MG per tablet Take 1 tablet by mouth every 8 hours as needed for moderate to severe pain   7/3/2019 at hs     potassium chloride ER (K-DUR/KLOR-CON M) 20 MEQ CR tablet Take 2 tablets (40 mEq) by mouth daily 60 tablet 1 7/3/2019 at am     pravastatin (PRAVACHOL) 40 MG tablet Take 1 tablet (40 mg) by mouth daily 90 tablet 1 7/3/2019 at hs     pregabalin (LYRICA) 100 MG capsule Take 100 mg by mouth 3 times daily 0700, 1 pm, 7 pm   7/3/2019 at 1900     tiZANidine (ZANAFLEX) 4 MG tablet Take 4 mg by mouth At Bedtime May repeat once if unable to sleep   7/3/2019 at hs     Turmeric, Curcuma Longa, (TURMERIC ROOT) POWD Take 1 capsule by mouth every evening    7/3/2019 at Unknown time     VITAMIN D, CHOLECALCIFEROL, PO Take 2,000 Units by mouth every evening    7/3/2019 at Unknown time             Discharge Medications:     Current Discharge Medication List      CONTINUE these medications which have CHANGED    Details   potassium chloride ER (K-DUR/KLOR-CON M) 20 MEQ CR tablet Take 1 tablet (20 mEq) by mouth daily  Qty: 30 tablet, Refills: 1    Associated Diagnoses: Hypokalemia         CONTINUE these medications which have NOT CHANGED    Details   amLODIPine (NORVASC) 10 MG tablet Take 1 tablet (10 mg) by mouth daily  Qty: 90 tablet, Refills: 1    Associated Diagnoses: Benign essential hypertension      aspirin (ASA) 325 MG EC tablet Take 1 tablet (325 mg) by mouth daily  Qty: 30 tablet, Refills: 0    Associated Diagnoses: Acute post-operative pain      COCONUT OIL PO Take 500 mg by mouth 2 times daily      CRANBERRY PO Take 1 capsule by mouth every evening        diclofenac (VOLTAREN) 1 % topical gel Place onto the skin every 4 hours As needed      fish oil-omega-3 fatty acids 1000 MG capsule Take 1 g by mouth every evening       glucosamine-chondroitin 500-400 MG CAPS per capsule Take 1 capsule by mouth 2 times daily       imipramine (TOFRANIL) 50 MG tablet Take 150 mg by mouth At Bedtime       levofloxacin (LEVAQUIN) 500 MG tablet Take 1 tablet (500 mg) by mouth daily  Qty: 5 tablet, Refills: 0    Associated Diagnoses: Cellulitis of left lower extremity      levothyroxine (SYNTHROID/LEVOTHROID) 112 MCG tablet Take 1 tablet (112 mcg) by mouth every evening  Qty: 90 tablet, Refills: 1    Associated Diagnoses: Hypothyroidism (acquired)      Multiple Vitamins-Minerals (MULTIVITAMIN ADULT PO) Take 1 tablet by mouth daily       oxyCODONE (OXYCONTIN) 10 MG 12 hr tablet Take 10 mg by mouth At Bedtime       oxyCODONE-acetaminophen (PERCOCET) 5-325 MG per tablet Take 1 tablet by mouth every 8 hours as needed for moderate to severe pain      pravastatin (PRAVACHOL) 40 MG tablet Take 1 tablet (40 mg) by mouth daily  Qty: 90 tablet, Refills: 1    Associated Diagnoses: Benign essential hypertension      pregabalin (LYRICA) 100 MG capsule Take 100 mg by mouth 3 times daily 0700, 1 pm, 7 pm      tiZANidine (ZANAFLEX) 4 MG tablet Take 4 mg by mouth At Bedtime May repeat once if unable to sleep      Turmeric, Curcuma Longa, (TURMERIC ROOT) POWD Take 1 capsule by mouth every evening       VITAMIN D, CHOLECALCIFEROL, PO Take 2,000 Units by mouth every evening          STOP taking these medications       amitriptyline (ELAVIL) 25 MG tablet Comments:   Reason for Stopping:         hydrochlorothiazide (HYDRODIURIL) 25 MG tablet Comments:   Reason for Stopping:         oxyCODONE (ROXICODONE) 5 MG tablet Comments:   Reason for Stopping:                          Brief History of Illness:     From Admission H+P:   This patient is a 59 year old  female with a significant past medical  "history of fibromyalgia, depression, hypothyroidism and recent ankle surgery 3 weeks ago for removal of suspected hardware who presents with concerns that her ankle wound has opened and isn't healing.  Has had ongoing pain in the ankle but by report to me says it's been doing \"ok\" and that she's been able to ambulate well although it's sore with walking on it.  No fever or chills.  She was seen by her primary care provider and started on levaquin yesterday and had some stitches removed. Late last night the wound opened some and she's had some serous drainage since that time.  No increase in redness or swelling no purulent drainage.  Has felt a bit more run down past few days but says that feels better already this AM after IVF and initial potassium replacement in ER last nigh.       Smokes 3/4 ppd  No alcohol or illicit drug use.              TODAY:     Subjective:  Doing well, see H+P from today.  No concerns.  Feels ready for discharge home      ROS:   ROS: 10 point ROS neg other than the symptoms noted above in the HPI.     /65 (BP Location: Left arm)   Pulse 71   Temp 98.4  F (36.9  C) (Oral)   Resp 20   Ht 1.575 m (5' 2\")   Wt 88 kg (194 lb)   SpO2 90%   BMI 35.48 kg/m     Exam unchanged from H+P today          Hospital Course:        Hypokalemia - suspect due to hydrochlorothiazide    7/4/2019 -- suspect this is due to her hydrochlorothiazide - that was started about 2 months ago and patient reports having had ongoing trouble with hypokalemia especially over the past month - got down to the low 1's one month ago, lowest we have on file is 2.5.  Has been taking a potassium supplement 20 meq daily over the past month.  Replacing potassium and stopping hydrochlorothiazide with plan to remain off this.  If potassium normalizes could discharge home later today with plan to continue 20 meq daily and recheck potassium at follow-up with primary care provider likely on Monday.    UPDATE: potassium " normalized to 3.5 after replacement - ok for discharge home with hydrochlorothiazide stopped and on 20 meq potassium - follow-up with primary care provider on Monday for recheck of potassium and reassessment of potassium supplement - hopefully won't need this long-term once off the hydrochlorothiazide.       History of right ankle hardware removal 3 weeks ago for suspected infected hardware, now with wound dehiscence after sutures removed in primary care provider clinic yesterday   7/4/2019 -- patient seen by orthopedics today who agreed that there is no evidence of clear/concerning infection on exam today, however agree with continuing levaquin given complex history and high risk for infection in history.  Follow-up with Dr. Kwon - recommend calling his office tomorrow to schedule a follow-up sooner than 2 weeks from now as currently planned.           Benign essential hypertension  7/4/2019 -- blood pressure stable at 130 systolic, continue home amlodipine, stopping home hydrochlorothiazide as above.          Hypercholesteremia  Continue pravastatin       Fibromyalgia/Depression  7/4/2019 -- appears baseline regarding pain with stable mood and no suicidal ideation -continue home medications including pain oxycodone/oxycontin, amitriptyline, lyrca and zanafelx.         Hypothyroidism (acquired)   7/4/2019 -- TSH normal 1 month ago, continue home levothyroxine       Tobacco abuse  7/4/2019 -- smokes 3/4 ppd, patient did not want patches or gum/lozenge or assistance with quitting.  Advised that quitting would help with wound healing.          Prophylaxis  Low risk, ambulate     Lines  PIV          Discharge Instructions and Follow-Up:     Discharge diet:       Regular Diet Adult       Discharge activity: No change   Discharge follow-up: Follow-up with your primary care provider on Monday as scheduled.  Recheck your potassium and reassess the potassium supplement at that time.     Regarding the ankle - recommend  calling Dr. Escobedo's office tomorrow to schedule a follow-up sooner than 2 weeks from now as currently planned.               Discharge Disposition:     Discharged to home      Attestation:  I have reviewed today's vital signs, notes, medications, labs and imaging.  Amount of time performed on this discharge summary: 30 additional minutes.    Aníbal Reyes MD, MD

## 2019-07-04 NOTE — PROGRESS NOTES
[facility] DISCHARGE NOTE    Patient discharged to home at 4:07 PM via ambulation. Accompanied by sister and staff. Discharge instructions reviewed with patient, opportunity offered to ask questions. Prescriptions sent to patients preferred pharmacy. All belongings sent with patient.    Carina Poon

## 2019-07-04 NOTE — PROGRESS NOTES
"[facility] ADMISSION NOTE    Patient admitted to room 2315 at approximately 0600 via ambulation from emergency room. Patient was accompanied by transport tech and sister.     Verbal SBAR report received from Gideon DC prior to patient arrival.     Patient ambulated to bed with stand-by assist. Patient alert and oriented X 3. Pain is not well controlled.  Medication(s) being used: narcotic analgesics including oxycodone. 0-10 Pain Scale: 10. Admission vital signs: Blood pressure 131/74, pulse 90, temperature 98.3  F (36.8  C), temperature source Oral, resp. rate 20, height 1.575 m (5' 2\"), weight 88 kg (194 lb), SpO2 94 %. Patient was oriented to plan of care, call light, bed controls, tv, telephone, bathroom and visiting hours.     Risk Assessment    The following safety risks were identified during admission: none. Yellow risk band applied: NO.     Skin Initial Assessment      Admission skin check not completed.  Telemetry monitor in placed..         Erin Manning    "

## 2019-07-04 NOTE — ED NOTES
Pt states that she had ankle  Foot surgery on June 13. It was to replace hardware that was associated with a cellulitis at the time. SHe  States that the incision never healed. She describes it pulling apart. She also describes oozing exudate. She states that the wound was never cultured. She is here for this as well as uncontrolled pain. She is on Percocet as well as Oxycodone. She is on oral antibiotics as well.

## 2019-07-04 NOTE — PLAN OF CARE
Patient alert and orientated. Vital signs stable. On room air and afebrile. Up with stand by assist/independent in the room.     Potassium replaced per protocol; recheck 3.5.     Dressing to right ankle changed by family. Patient reporting pain rated a 8-10/10. Scheduled and PRN oxycodone given.     Patient denied any lightheadedness/dizziness, difficulty breathing, nausea, or numbness/tingling. Tele monitor on.

## 2019-07-04 NOTE — ED NOTES
"Patient has  [place] to Observation  order. Patient has been given the Observation brochure -  What does Observation mean to me.\"  Patient has been given the opportunity to ask questions about observation status and their plan of care.      CLARY SANDERS    "

## 2019-07-08 ENCOUNTER — TELEPHONE (OUTPATIENT)
Dept: FAMILY MEDICINE | Facility: CLINIC | Age: 59
End: 2019-07-08

## 2019-07-08 ENCOUNTER — OFFICE VISIT (OUTPATIENT)
Dept: FAMILY MEDICINE | Facility: CLINIC | Age: 59
End: 2019-07-08
Payer: COMMERCIAL

## 2019-07-08 VITALS
TEMPERATURE: 97.6 F | WEIGHT: 194 LBS | RESPIRATION RATE: 16 BRPM | HEIGHT: 62 IN | SYSTOLIC BLOOD PRESSURE: 130 MMHG | DIASTOLIC BLOOD PRESSURE: 72 MMHG | OXYGEN SATURATION: 97 % | HEART RATE: 79 BPM | BODY MASS INDEX: 35.7 KG/M2

## 2019-07-08 DIAGNOSIS — G43.909 MIGRAINE WITHOUT STATUS MIGRAINOSUS, NOT INTRACTABLE, UNSPECIFIED MIGRAINE TYPE: ICD-10-CM

## 2019-07-08 DIAGNOSIS — E87.6 HYPOKALEMIA: ICD-10-CM

## 2019-07-08 DIAGNOSIS — L03.119 RECURRENT CELLULITIS OF LOWER EXTREMITY: ICD-10-CM

## 2019-07-08 DIAGNOSIS — T14.8XXA WOUND INFECTION: Primary | ICD-10-CM

## 2019-07-08 DIAGNOSIS — L08.9 WOUND INFECTION: Primary | ICD-10-CM

## 2019-07-08 LAB
CRP SERPL-MCNC: 16.7 MG/L (ref 0–8)
ERYTHROCYTE [DISTWIDTH] IN BLOOD BY AUTOMATED COUNT: 13.6 % (ref 10–15)
GRAM STN SPEC: NORMAL
GRAM STN SPEC: NORMAL
HCT VFR BLD AUTO: 46 % (ref 35–47)
HGB BLD-MCNC: 15.2 G/DL (ref 11.7–15.7)
Lab: NORMAL
MCH RBC QN AUTO: 29.9 PG (ref 26.5–33)
MCHC RBC AUTO-ENTMCNC: 33 G/DL (ref 31.5–36.5)
MCV RBC AUTO: 90 FL (ref 78–100)
PLATELET # BLD AUTO: 397 10E9/L (ref 150–450)
POTASSIUM SERPL-SCNC: 3.6 MMOL/L (ref 3.4–5.3)
RBC # BLD AUTO: 5.09 10E12/L (ref 3.8–5.2)
SPECIMEN SOURCE: NORMAL
WBC # BLD AUTO: 10.8 10E9/L (ref 4–11)

## 2019-07-08 PROCEDURE — 84132 ASSAY OF SERUM POTASSIUM: CPT | Performed by: NURSE PRACTITIONER

## 2019-07-08 PROCEDURE — 99214 OFFICE O/P EST MOD 30 MIN: CPT | Performed by: NURSE PRACTITIONER

## 2019-07-08 PROCEDURE — 87205 SMEAR GRAM STAIN: CPT | Performed by: NURSE PRACTITIONER

## 2019-07-08 PROCEDURE — 85027 COMPLETE CBC AUTOMATED: CPT | Performed by: NURSE PRACTITIONER

## 2019-07-08 PROCEDURE — 86140 C-REACTIVE PROTEIN: CPT | Performed by: NURSE PRACTITIONER

## 2019-07-08 PROCEDURE — 36415 COLL VENOUS BLD VENIPUNCTURE: CPT | Performed by: NURSE PRACTITIONER

## 2019-07-08 PROCEDURE — 87070 CULTURE OTHR SPECIMN AEROBIC: CPT | Performed by: NURSE PRACTITIONER

## 2019-07-08 RX ORDER — DOXYCYCLINE 100 MG/1
100 CAPSULE ORAL 2 TIMES DAILY
Qty: 20 CAPSULE | Refills: 0 | Status: ON HOLD | OUTPATIENT
Start: 2019-07-08 | End: 2019-07-27

## 2019-07-08 RX ORDER — IMIPRAMINE HCL 50 MG
150 TABLET ORAL AT BEDTIME
Qty: 90 TABLET | Refills: 3 | Status: SHIPPED | OUTPATIENT
Start: 2019-07-08

## 2019-07-08 ASSESSMENT — MIFFLIN-ST. JEOR: SCORE: 1413.23

## 2019-07-08 NOTE — PATIENT INSTRUCTIONS
Potassium recheck  Wound culture  Start Doxy 100 mg twice daily for 10 days   Follow up with ortho     And schedule with infectious disease doctor

## 2019-07-08 NOTE — PROGRESS NOTES
"Subjective     Britni Tavarez is a 59 year old female who presents to clinic today for the following health issues:    [facility]       Hospital Follow-up Visit:    Chief Complaint   Patient presents with     Hospital F/U     Refill Request     [facility] Rehab Facility: [facility]  Date of Admission: 7/4   Date of Discharge: 7/4   Reason(s) for Admission: Hypokalemia   Postoperative wound dehiscence            Problems taking medications regularly:  None       Medication changes since discharge: taking potassium 20 MEQ once daily        Problems adhering to non-medication therapy:  None    Summary of hospitalization:  [facility] discharge summary reviewed  Diagnostic Tests/Treatments reviewed.  Follow up needed: ortho  Other Healthcare Providers Involved in Patient s Care:         orthopedics   Update since discharge: improved.     Post Discharge Medication Reconciliation: discharge medications reconciled and changed, per note/orders (see AVS).  Plan of care communicated with patient     Coding guidelines for this visit:  Type of Medical   Decision Making Face-to-Face Visit       within 7 Days of discharge Face-to-Face Visit        within 14 days of discharge   Moderate Complexity [id number]   High Complexity [id number]            Reviewed and updated as needed this visit by Provider         Review of Systems   ROS COMP: Constitutional, HEENT, cardiovascular, pulmonary, gi and gu systems are negative, except as otherwise noted.      Objective    /72 (BP Location: Left arm, Patient Position: Sitting, Cuff Size: Adult Regular)   Pulse 79   Temp 97.6  F (36.4  C) (Tympanic)   Resp 16   Ht 1.575 m (5' 2\")   Wt 88 kg (194 lb)   SpO2 97%   BMI 35.48 kg/m    Body mass index is 35.48 kg/m .  Physical Exam   GENERAL: healthy, alert and no distress  CV: regular rates and rhythm, normal S1 S2, no S3 or S4, no murmur, click or rub and peripheral pulses " strong  MS: right ankle moderate edema, there is mild clear drainage from the wound with mild surrounding erythema, tender to palpation   NEURO: Normal strength and tone, mentation intact and speech normal  PSYCH: mentation appears normal, affect normal/bright    Diagnostic Test Results:  Labs reviewed in Epic  Pending         Assessment & Plan     1. Wound infection    - Wound Culture Aerobic Bacterial; Future  - Gram stain; Future  - INFECTIOUS DISEASE REFERRAL  - Gram stain  - Wound Culture Aerobic Bacterial  - doxycycline hyclate (VIBRAMYCIN) 100 MG capsule; Take 1 capsule (100 mg) by mouth 2 times daily for 10 days  Dispense: 20 capsule; Refill: 0  - CRP, inflammation  - CBC with platelets    2. Migraine without status migrainosus, not intractable, unspecified migraine type  -stable, well controlled   - imipramine (TOFRANIL) 50 MG tablet; Take 3 tablets (150 mg) by mouth At Bedtime  Dispense: 90 tablet; Refill: 3    3. Recurrent cellulitis of lower extremity  - INFECTIOUS DISEASE REFERRAL    4. Hypokalemia    - Potassium-normal  -continue Potassium supplement 20 MEQ daily     See Patient Instructions    Return in about 2 weeks (around 7/22/2019) for Routine Visit.    JOSE Bailey [facility]

## 2019-07-08 NOTE — LETTER
[facility]  [address]  [facility] [address]  Phone: [phone]      7/8/2019     Britni Tavarez  [address] [facility] [address]      Dear Britni:    Thank you for allowing me to participate in your care. Your recent test results were reviewed and listed below.      Complete blood count normal. CRP level still slightly elevated but improving. Potassium level normal, continue Potassium 20 MEQ daily.     JOSE Bailey CNP     Results for orders placed or performed in visit on 07/08/19   Potassium   Result Value Ref Range    Potassium 3.6 3.4 - 5.3 mmol/L   CRP, inflammation   Result Value Ref Range    CRP Inflammation 16.7 (H) 0.0 - 8.0 mg/L   CBC with platelets   Result Value Ref Range    WBC 10.8 4.0 - 11.0 10e9/L    RBC Count 5.09 3.8 - 5.2 10e12/L    Hemoglobin 15.2 11.7 - 15.7 g/dL    Hematocrit 46.0 35.0 - 47.0 %    MCV 90 78 - 100 fl    MCH 29.9 26.5 - 33.0 pg    MCHC 33.0 31.5 - 36.5 g/dL    RDW 13.6 10.0 - 15.0 %    Platelet Count 397 150 - 450 10e9/L         Thank you for choosing [facility]. As a result, please continue with the treatment plan discussed in the office. Return as discussed or sooner if symptoms worsen or fail to improve.     If you have any further questions or concerns, please do not hesitate to contact us.      Sincerely,    Ella Zhang

## 2019-07-10 LAB
BACTERIA SPEC CULT: NO GROWTH
Lab: NORMAL
SPECIMEN SOURCE: NORMAL

## 2019-07-10 NOTE — TELEPHONE ENCOUNTER
Hospital/TCU/ED for chronic condition Discharge Protocol    Patient seen in clinic 7/8/19.    Melissa Mancilla RN

## 2019-07-26 ENCOUNTER — HOSPITAL ENCOUNTER (INPATIENT)
Facility: CLINIC | Age: 59
LOS: 3 days | Discharge: HOME | DRG: 560 | End: 2019-07-29
Attending: FAMILY MEDICINE | Admitting: FAMILY MEDICINE
Payer: COMMERCIAL

## 2019-07-26 ENCOUNTER — APPOINTMENT (OUTPATIENT)
Dept: MRI IMAGING | Facility: CLINIC | Age: 59
DRG: 560 | End: 2019-07-26
Attending: FAMILY MEDICINE
Payer: COMMERCIAL

## 2019-07-26 ENCOUNTER — APPOINTMENT (OUTPATIENT)
Dept: ULTRASOUND IMAGING | Facility: CLINIC | Age: 59
DRG: 560 | End: 2019-07-26
Attending: FAMILY MEDICINE
Payer: COMMERCIAL

## 2019-07-26 DIAGNOSIS — M86.9 OSTEOMYELITIS OF RIGHT ANKLE, UNSPECIFIED TYPE (H): ICD-10-CM

## 2019-07-26 DIAGNOSIS — M86.071: Primary | ICD-10-CM

## 2019-07-26 DIAGNOSIS — E87.6 HYPOKALEMIA: ICD-10-CM

## 2019-07-26 DIAGNOSIS — M79.89 SWELLING OF LIMB: ICD-10-CM

## 2019-07-26 LAB
ALBUMIN SERPL-MCNC: 4 G/DL (ref 3.4–5)
ALP SERPL-CCNC: 118 U/L (ref 40–150)
ALT SERPL W P-5'-P-CCNC: 19 U/L (ref 0–50)
ANION GAP SERPL CALCULATED.3IONS-SCNC: 8 MMOL/L (ref 3–14)
AST SERPL W P-5'-P-CCNC: 13 U/L (ref 0–45)
BASOPHILS # BLD AUTO: 0.1 10E9/L (ref 0–0.2)
BASOPHILS NFR BLD AUTO: 0.9 %
BILIRUB SERPL-MCNC: 0.4 MG/DL (ref 0.2–1.3)
BUN SERPL-MCNC: 11 MG/DL (ref 7–30)
CALCIUM SERPL-MCNC: 9.2 MG/DL (ref 8.5–10.1)
CHLORIDE SERPL-SCNC: 108 MMOL/L (ref 94–109)
CO2 SERPL-SCNC: 24 MMOL/L (ref 20–32)
CREAT SERPL-MCNC: 0.66 MG/DL (ref 0.52–1.04)
CRP SERPL-MCNC: 35 MG/L (ref 0–8)
DIFFERENTIAL METHOD BLD: ABNORMAL
EOSINOPHIL # BLD AUTO: 0.2 10E9/L (ref 0–0.7)
EOSINOPHIL NFR BLD AUTO: 2.1 %
ERYTHROCYTE [DISTWIDTH] IN BLOOD BY AUTOMATED COUNT: 13.4 % (ref 10–15)
ERYTHROCYTE [SEDIMENTATION RATE] IN BLOOD BY WESTERGREN METHOD: 14 MM/H (ref 0–30)
GFR SERPL CREATININE-BSD FRML MDRD: >90 ML/MIN/{1.73_M2}
GLUCOSE SERPL-MCNC: 93 MG/DL (ref 70–99)
HCT VFR BLD AUTO: 47.1 % (ref 35–47)
HGB BLD-MCNC: 15.1 G/DL (ref 11.7–15.7)
IMM GRANULOCYTES # BLD: 0 10E9/L (ref 0–0.4)
IMM GRANULOCYTES NFR BLD: 0.3 %
LYMPHOCYTES # BLD AUTO: 3.5 10E9/L (ref 0.8–5.3)
LYMPHOCYTES NFR BLD AUTO: 39.3 %
MCH RBC QN AUTO: 28.7 PG (ref 26.5–33)
MCHC RBC AUTO-ENTMCNC: 32.1 G/DL (ref 31.5–36.5)
MCV RBC AUTO: 90 FL (ref 78–100)
MONOCYTES # BLD AUTO: 0.6 10E9/L (ref 0–1.3)
MONOCYTES NFR BLD AUTO: 6.6 %
NEUTROPHILS # BLD AUTO: 4.5 10E9/L (ref 1.6–8.3)
NEUTROPHILS NFR BLD AUTO: 50.8 %
NRBC # BLD AUTO: 0 10*3/UL
NRBC BLD AUTO-RTO: 0 /100
PLATELET # BLD AUTO: 270 10E9/L (ref 150–450)
POTASSIUM SERPL-SCNC: 3.2 MMOL/L (ref 3.4–5.3)
PROT SERPL-MCNC: 8.2 G/DL (ref 6.8–8.8)
RBC # BLD AUTO: 5.26 10E12/L (ref 3.8–5.2)
SODIUM SERPL-SCNC: 140 MMOL/L (ref 133–144)
WBC # BLD AUTO: 8.9 10E9/L (ref 4–11)

## 2019-07-26 PROCEDURE — 99285 EMERGENCY DEPT VISIT HI MDM: CPT | Mod: 25 | Performed by: FAMILY MEDICINE

## 2019-07-26 PROCEDURE — 25800030 ZZH RX IP 258 OP 636: Performed by: FAMILY MEDICINE

## 2019-07-26 PROCEDURE — 96365 THER/PROPH/DIAG IV INF INIT: CPT | Mod: 59 | Performed by: FAMILY MEDICINE

## 2019-07-26 PROCEDURE — 80053 COMPREHEN METABOLIC PANEL: CPT | Performed by: FAMILY MEDICINE

## 2019-07-26 PROCEDURE — 93971 EXTREMITY STUDY: CPT | Mod: RT

## 2019-07-26 PROCEDURE — 96367 TX/PROPH/DG ADDL SEQ IV INF: CPT | Performed by: FAMILY MEDICINE

## 2019-07-26 PROCEDURE — 25500064 ZZH RX 255 OP 636: Performed by: FAMILY MEDICINE

## 2019-07-26 PROCEDURE — 87040 BLOOD CULTURE FOR BACTERIA: CPT | Performed by: FAMILY MEDICINE

## 2019-07-26 PROCEDURE — 25000128 H RX IP 250 OP 636: Performed by: FAMILY MEDICINE

## 2019-07-26 PROCEDURE — 99285 EMERGENCY DEPT VISIT HI MDM: CPT | Mod: Z6 | Performed by: FAMILY MEDICINE

## 2019-07-26 PROCEDURE — 25000132 ZZH RX MED GY IP 250 OP 250 PS 637: Performed by: FAMILY MEDICINE

## 2019-07-26 PROCEDURE — 99223 1ST HOSP IP/OBS HIGH 75: CPT | Mod: AI | Performed by: FAMILY MEDICINE

## 2019-07-26 PROCEDURE — A9585 GADOBUTROL INJECTION: HCPCS | Performed by: FAMILY MEDICINE

## 2019-07-26 PROCEDURE — 36415 COLL VENOUS BLD VENIPUNCTURE: CPT | Performed by: FAMILY MEDICINE

## 2019-07-26 PROCEDURE — 73723 MRI JOINT LWR EXTR W/O&W/DYE: CPT | Mod: RT

## 2019-07-26 PROCEDURE — 12000000 ZZH R&B MED SURG/OB

## 2019-07-26 PROCEDURE — 85652 RBC SED RATE AUTOMATED: CPT | Performed by: FAMILY MEDICINE

## 2019-07-26 PROCEDURE — 85025 COMPLETE CBC W/AUTO DIFF WBC: CPT | Performed by: FAMILY MEDICINE

## 2019-07-26 PROCEDURE — 86140 C-REACTIVE PROTEIN: CPT | Performed by: FAMILY MEDICINE

## 2019-07-26 RX ORDER — IBUPROFEN 600 MG/1
600 TABLET, FILM COATED ORAL EVERY 6 HOURS PRN
Status: DISCONTINUED | OUTPATIENT
Start: 2019-07-26 | End: 2019-07-26

## 2019-07-26 RX ORDER — CEFTRIAXONE SODIUM 1 G/50ML
1 INJECTION, SOLUTION INTRAVENOUS EVERY 24 HOURS
Status: DISCONTINUED | OUTPATIENT
Start: 2019-07-26 | End: 2019-07-29 | Stop reason: HOSPADM

## 2019-07-26 RX ORDER — POTASSIUM CHLORIDE 1.5 G/1.58G
20-40 POWDER, FOR SOLUTION ORAL
Status: DISCONTINUED | OUTPATIENT
Start: 2019-07-26 | End: 2019-07-29 | Stop reason: HOSPADM

## 2019-07-26 RX ORDER — LEVOTHYROXINE SODIUM 112 UG/1
112 TABLET ORAL
Status: DISCONTINUED | OUTPATIENT
Start: 2019-07-27 | End: 2019-07-29 | Stop reason: HOSPADM

## 2019-07-26 RX ORDER — POTASSIUM CHLORIDE 1500 MG/1
20-40 TABLET, EXTENDED RELEASE ORAL
Status: DISCONTINUED | OUTPATIENT
Start: 2019-07-26 | End: 2019-07-29 | Stop reason: HOSPADM

## 2019-07-26 RX ORDER — AMLODIPINE BESYLATE 10 MG/1
10 TABLET ORAL EVERY MORNING
Status: DISCONTINUED | OUTPATIENT
Start: 2019-07-27 | End: 2019-07-29 | Stop reason: HOSPADM

## 2019-07-26 RX ORDER — HYDROMORPHONE HYDROCHLORIDE 1 MG/ML
.3-.5 INJECTION, SOLUTION INTRAMUSCULAR; INTRAVENOUS; SUBCUTANEOUS
Status: DISCONTINUED | OUTPATIENT
Start: 2019-07-26 | End: 2019-07-29 | Stop reason: HOSPADM

## 2019-07-26 RX ORDER — OXYCODONE HYDROCHLORIDE 5 MG/1
5-10 TABLET ORAL
Status: DISCONTINUED | OUTPATIENT
Start: 2019-07-26 | End: 2019-07-29 | Stop reason: HOSPADM

## 2019-07-26 RX ORDER — ACETAMINOPHEN 325 MG/1
650 TABLET ORAL EVERY 4 HOURS PRN
Status: DISCONTINUED | OUTPATIENT
Start: 2019-07-26 | End: 2019-07-29 | Stop reason: HOSPADM

## 2019-07-26 RX ORDER — GINSENG 100 MG
200 CAPSULE ORAL EVERY EVENING
Status: DISCONTINUED | OUTPATIENT
Start: 2019-07-27 | End: 2019-07-27 | Stop reason: CLARIF

## 2019-07-26 RX ORDER — CHLORAL HYDRATE 500 MG
1 CAPSULE ORAL EVERY EVENING
Status: DISCONTINUED | OUTPATIENT
Start: 2019-07-27 | End: 2019-07-29 | Stop reason: HOSPADM

## 2019-07-26 RX ORDER — POTASSIUM CHLORIDE 29.8 MG/ML
20 INJECTION INTRAVENOUS
Status: DISCONTINUED | OUTPATIENT
Start: 2019-07-26 | End: 2019-07-26

## 2019-07-26 RX ORDER — IMIPRAMINE HCL 25 MG
150 TABLET ORAL AT BEDTIME
Status: DISCONTINUED | OUTPATIENT
Start: 2019-07-27 | End: 2019-07-29 | Stop reason: HOSPADM

## 2019-07-26 RX ORDER — POTASSIUM CHLORIDE 750 MG/1
30 TABLET, EXTENDED RELEASE ORAL DAILY
Status: DISCONTINUED | OUTPATIENT
Start: 2019-07-27 | End: 2019-07-29 | Stop reason: HOSPADM

## 2019-07-26 RX ORDER — OXYCODONE AND ACETAMINOPHEN 5; 325 MG/1; MG/1
2 TABLET ORAL ONCE
Status: COMPLETED | OUTPATIENT
Start: 2019-07-26 | End: 2019-07-26

## 2019-07-26 RX ORDER — OXYCODONE HCL 10 MG/1
10 TABLET, FILM COATED, EXTENDED RELEASE ORAL AT BEDTIME
Status: DISCONTINUED | OUTPATIENT
Start: 2019-07-27 | End: 2019-07-29 | Stop reason: HOSPADM

## 2019-07-26 RX ORDER — PRAVASTATIN SODIUM 20 MG
40 TABLET ORAL AT BEDTIME
Status: DISCONTINUED | OUTPATIENT
Start: 2019-07-27 | End: 2019-07-29 | Stop reason: HOSPADM

## 2019-07-26 RX ORDER — PERPHENAZINE/AMITRIPTYLINE HCL 4MG-10MG
500 TABLET ORAL 2 TIMES DAILY
Status: DISCONTINUED | OUTPATIENT
Start: 2019-07-27 | End: 2019-07-27 | Stop reason: CLARIF

## 2019-07-26 RX ORDER — PREGABALIN 100 MG/1
100 CAPSULE ORAL 3 TIMES DAILY
Status: DISCONTINUED | OUTPATIENT
Start: 2019-07-27 | End: 2019-07-29 | Stop reason: HOSPADM

## 2019-07-26 RX ORDER — POTASSIUM CHLORIDE 7.45 MG/ML
10 INJECTION INTRAVENOUS
Status: DISCONTINUED | OUTPATIENT
Start: 2019-07-26 | End: 2019-07-29 | Stop reason: HOSPADM

## 2019-07-26 RX ORDER — SODIUM PHOSPHATE,MONO-DIBASIC 19G-7G/118
500 ENEMA (ML) RECTAL 2 TIMES DAILY
Status: DISCONTINUED | OUTPATIENT
Start: 2019-07-27 | End: 2019-07-29 | Stop reason: HOSPADM

## 2019-07-26 RX ORDER — GADOBUTROL 604.72 MG/ML
8 INJECTION INTRAVENOUS ONCE
Status: COMPLETED | OUTPATIENT
Start: 2019-07-26 | End: 2019-07-26

## 2019-07-26 RX ORDER — NALOXONE HYDROCHLORIDE 0.4 MG/ML
.1-.4 INJECTION, SOLUTION INTRAMUSCULAR; INTRAVENOUS; SUBCUTANEOUS
Status: DISCONTINUED | OUTPATIENT
Start: 2019-07-26 | End: 2019-07-29 | Stop reason: HOSPADM

## 2019-07-26 RX ORDER — NALOXONE HYDROCHLORIDE 0.4 MG/ML
.1-.4 INJECTION, SOLUTION INTRAMUSCULAR; INTRAVENOUS; SUBCUTANEOUS
Status: DISCONTINUED | OUTPATIENT
Start: 2019-07-26 | End: 2019-07-28

## 2019-07-26 RX ORDER — POTASSIUM CL/LIDO/0.9 % NACL 10MEQ/0.1L
10 INTRAVENOUS SOLUTION, PIGGYBACK (ML) INTRAVENOUS
Status: DISCONTINUED | OUTPATIENT
Start: 2019-07-26 | End: 2019-07-29 | Stop reason: HOSPADM

## 2019-07-26 RX ADMIN — OXYCODONE HYDROCHLORIDE AND ACETAMINOPHEN 2 TABLET: 5; 325 TABLET ORAL at 18:32

## 2019-07-26 RX ADMIN — VANCOMYCIN HYDROCHLORIDE 1500 MG: 10 INJECTION, POWDER, LYOPHILIZED, FOR SOLUTION INTRAVENOUS at 21:51

## 2019-07-26 RX ADMIN — GADOBUTROL 8 ML: 604.72 INJECTION INTRAVENOUS at 20:02

## 2019-07-26 RX ADMIN — CEFTRIAXONE SODIUM 1 G: 1 INJECTION, SOLUTION INTRAVENOUS at 21:08

## 2019-07-26 RX ADMIN — OXYCODONE HYDROCHLORIDE 10 MG: 5 TABLET ORAL at 23:38

## 2019-07-26 ASSESSMENT — MIFFLIN-ST. JEOR
SCORE: 1406.25
SCORE: 1393.25

## 2019-07-26 ASSESSMENT — ACTIVITIES OF DAILY LIVING (ADL): AMBULATION: 0-->INDEPENDENT

## 2019-07-26 NOTE — ED PROVIDER NOTES
"  History     Chief Complaint   Patient presents with     Ankle Pain     HPI    Britni Tavarez is a 59 year old female who presents with right ankle pain.  She had an ankle fusion many years ago and on June 13 underwent explantation of 4 screws left over from that previous procedure.  This was done because of chronic pain with concern for infected hardware.  She had wound dehiscence and was seen on July 3 in the clinic and started on Levaquin.  She presented to the ED the following day and was admitted for hypokalemia with low suspicion for infection but Levaquin was continued for a total of 6 days.  She was seen in her pain clinic today which is the out of my her pain clinic.  No records are available.  She received 150 Percocet per month, 30 OxyContin per month, Lyrica, and 22 Percocet 7.5-325/month.  She states that her pain medication is not controlling her pain and that she is having difficulty walking and bearing weight because of pain.  She states that her ankle feels \"on fire\".  She thinks that her ankle is infected.  She has noticed some swelling in the ankle but says it has been less since she has been \"bedridden.\"  She denies fevers, nausea, chest pain, shortness of breath, rapid or irregular heartbeat.  She denies abdominal pain, nausea, vomiting, bowel or bladder dysfunction.    Allergies:  Allergies   Allergen Reactions     Atorvastatin GI Disturbance     Augmentin Hives     Cortisone Other (See Comments)     IM only     Flagyl [Metronidazole] Nausea     Ibuprofen Other (See Comments)     Increased bleeding     Lasix [Furosemide] Other (See Comments)     Morphine Hives     Propranolol Other (See Comments)     Low blood pressure and fainting          Simvastatin Muscle Pain (Myalgia)     Sulfa Drugs Hives     Valtrex [Valacyclovir] Hives     Penicillins Rash     Prednisone Rash       Problem List:    Patient Active Problem List    Diagnosis Date Noted     Hypokalemia 07/04/2019     Priority: " Medium     Recurrent sinus infections 06/06/2019     Priority: Medium     SNHL (sensorineural hearing loss) 04/17/2017     Priority: Medium     Tinnitus, bilateral 04/17/2017     Priority: Medium     Benign essential hypertension 04/17/2017     Priority: Medium     Hypercholesteremia 04/17/2017     Priority: Medium     Fibromyalgia 04/17/2017     Priority: Medium     History of tympanoplasty of right ear 04/17/2017     Priority: Medium     Depression 10/13/2016     Priority: Medium     Midline low back pain without sciatica 06/09/2015     Priority: Medium     Spinal enthesopathy (H) 01/20/2015     Priority: Medium     Pain medication agreement 12/06/2011     Priority: Medium     Overview:   Signed 12/6/11 - OK for #75 Norco (Hydrocodone/Acetaminophen 5/325 mg) per month.  Referral to Pain Clinic made in July 2016, patient says she has an appointment October 2016. In 12/2016 Patient says she went to a pain clinic and they wanted her to come off her blood pressure meds and continue Norco (no record of that visit have been received).  her  checked 3/10/2017 showing compliance with her agreement.  Last urine drug screen 9/2016 shows no unexpected findings.       Hypothyroidism (acquired) 12/13/2005     Priority: Medium     Migraine headache 12/13/2005     Priority: Medium     Overview:   Was on propranolol for prevention but caused falls  Also takes imipramine.  100 mg each night, consider decreasing as HA less frequent as of 12/2016  Neurology referral as of September 2016-didn't go because HA less frequent.       Osteopenia 12/13/2005     Priority: Medium     Overview:   Last DEXA in 2012 (stable), repeat in 2014--Declined in 2014.  Normal vitamin D level x 2.          Past Medical History:    Past Medical History:   Diagnosis Date     Back pain      Cellulitis      Chronic pain      Depressive disorder      Elevated cholesterol      Fibromyalgia      Hypertension      Hypothyroid      Migraines      Osteopenia   "    Recurrent sinus infections      Spinal enthesopathy (H)        Past Surgical History:    Past Surgical History:   Procedure Laterality Date     HYSTERECTOMY       REMOVE HARDWARE ANKLE Right 6/13/2019    Procedure: RIGHT ANKLE: DEEP HARDWARE REMOVAL; SUBTALAR AND MIDTARSAL JOINT DEBRIDEMENTS;  Surgeon: Nikita Escobedo DPM;  Location: [facility] OR     TYMPANOPLASTY         Family History:    Family History   Problem Relation Age of Onset     Cancer Father         Lung        Social History:  Marital Status:   [5]  Social History     Tobacco Use     Smoking status: Current Every Day Smoker     Packs/day: 1.00     Smokeless tobacco: Never Used   Substance Use Topics     Alcohol use: Not Currently     Frequency: Never     Drug use: Never        Medications:      amLODIPine (NORVASC) 10 MG tablet   aspirin (ASA) 325 MG EC tablet   COCONUT OIL PO   CRANBERRY PO   diclofenac (VOLTAREN) 1 % topical gel   fish oil-omega-3 fatty acids 1000 MG capsule   glucosamine-chondroitin 500-400 MG CAPS per capsule   imipramine (TOFRANIL) 50 MG tablet   levofloxacin (LEVAQUIN) 500 MG tablet   levothyroxine (SYNTHROID/LEVOTHROID) 112 MCG tablet   Multiple Vitamins-Minerals (MULTIVITAMIN ADULT PO)   oxyCODONE (OXYCONTIN) 10 MG 12 hr tablet   oxyCODONE-acetaminophen (PERCOCET) 5-325 MG per tablet   potassium chloride ER (K-DUR/KLOR-CON M) 20 MEQ CR tablet   pravastatin (PRAVACHOL) 40 MG tablet   pregabalin (LYRICA) 100 MG capsule   tiZANidine (ZANAFLEX) 4 MG tablet   Turmeric, Curcuma Longa, (TURMERIC ROOT) POWD   VITAMIN D, CHOLECALCIFEROL, PO         Review of Systems    All other systems are reviewed and are negative    Physical Exam   BP: 120/81  Pulse: 72  Heart Rate: 72  Temp: 98.2  F (36.8  C)  Resp: 16  Height: 157.5 cm (5' 2\")  Weight: 86 kg (189 lb 9.5 oz)  SpO2: 97 %      Physical Exam    Nursing note and vitals were reviewed.  Constitutional: Awake and alert, adequately nourished and developed appearing 59-year-old in " no apparent discomfort, who does not appear acutely ill, and who answers questions appropriately and cooperates with examination.  HEENT: EOMI.   Neck: Freely mobile.  Cardiovascular: Cardiac examination reveals normal heart rate and regular rhythm without murmur.  Pulmonary/Chest: Breathing is unlabored.  Breath sounds are clear and equal bilaterally.  There no retractions, tachypnea, rales, wheezes, or rhonchi.  Abdomen: Soft, nontender, no HSM or masses rebound or guarding.  Musculoskeletal: Extremities are warm and well-perfused.  She has 1-2+ pitting edema up to the pretibial region.  She has warmth of the ankle but no redness.  The wounds are well-healed.  There is no dehiscence or erythema.  There is no purulent drainage.  Foot is warm and well-perfused.  Ankle motion is somewhat decreased.  There is mild pain with range of motion of the ankle.  There is no ligamentous instability.  Neurological: Alert, oriented, thought content logical, coherent   Skin: Warm, dry, no rashes.  Psychiatric: Affect broad and appropriate.      ED Course        Procedures               Critical Care time:  none               Results for orders placed or performed during the hospital encounter of 07/26/19 (from the past 24 hour(s))   Comprehensive metabolic panel   Result Value Ref Range    Sodium 140 133 - 144 mmol/L    Potassium 3.2 (L) 3.4 - 5.3 mmol/L    Chloride 108 94 - 109 mmol/L    Carbon Dioxide 24 20 - 32 mmol/L    Anion Gap 8 3 - 14 mmol/L    Glucose 93 70 - 99 mg/dL    Urea Nitrogen 11 7 - 30 mg/dL    Creatinine 0.66 0.52 - 1.04 mg/dL    GFR Estimate >90 >60 mL/min/[1.73_m2]    GFR Estimate If Black >90 >60 mL/min/[1.73_m2]    Calcium 9.2 8.5 - 10.1 mg/dL    Bilirubin Total 0.4 0.2 - 1.3 mg/dL    Albumin 4.0 3.4 - 5.0 g/dL    Protein Total 8.2 6.8 - 8.8 g/dL    Alkaline Phosphatase 118 40 - 150 U/L    ALT 19 0 - 50 U/L    AST 13 0 - 45 U/L   CBC with platelets differential   Result Value Ref Range    WBC 8.9 4.0 - 11.0  10e9/L    RBC Count 5.26 (H) 3.8 - 5.2 10e12/L    Hemoglobin 15.1 11.7 - 15.7 g/dL    Hematocrit 47.1 (H) 35.0 - 47.0 %    MCV 90 78 - 100 fl    MCH 28.7 26.5 - 33.0 pg    MCHC 32.1 31.5 - 36.5 g/dL    RDW 13.4 10.0 - 15.0 %    Platelet Count 270 150 - 450 10e9/L    Diff Method Automated Method     % Neutrophils 50.8 %    % Lymphocytes 39.3 %    % Monocytes 6.6 %    % Eosinophils 2.1 %    % Basophils 0.9 %    % Immature Granulocytes 0.3 %    Nucleated RBCs 0 0 /100    Absolute Neutrophil 4.5 1.6 - 8.3 10e9/L    Absolute Lymphocytes 3.5 0.8 - 5.3 10e9/L    Absolute Monocytes 0.6 0.0 - 1.3 10e9/L    Absolute Eosinophils 0.2 0.0 - 0.7 10e9/L    Absolute Basophils 0.1 0.0 - 0.2 10e9/L    Abs Immature Granulocytes 0.0 0 - 0.4 10e9/L    Absolute Nucleated RBC 0.0    CRP inflammation   Result Value Ref Range    CRP Inflammation 35.0 (H) 0.0 - 8.0 mg/L   Erythrocyte sedimentation rate auto   Result Value Ref Range    Sed Rate 14 0 - 30 mm/h   MR Ankle Right w/o & w Contrast    Narrative    MR ANKLE RIGHT WITHOUT AND WITH CONTRAST    7/26/2019 8:04 PM     HISTORY: Chronic right ankle pain. Explantation of hardware June 13.  Concern for osteomyelitis.    COMPARISON: Radiographs from 7/4/2019.    TECHNIQUE: Sagittal T1 and T2 axial T1 fat saturated pre and  postgadolinium, axial STIR, and coronal T1 fat-saturated pre and  postgadolinium.    FINDINGS:    Bones and joints: Postoperative changes from partial distal fibular  shaft resection and tibiotalar arthrodesis. The arthrodesis screws  have been removed. No hardware remains. There is solid bony union at  the arthrodesis. However, there is moderate to marked marrow edema and  enhancement in the distal tibia and throughout the talus, particularly  the talar dome and talar head.    Moderate degenerative changes in the posterior subtalar joint.  Mild-to-moderate degenerative changes in the middle facet of the  subtalar joint. Mild degenerative changes at the talonavicular  joint  and calcaneocuboid joint. There is an effusion in the posterior  subtalar joint and there are intra-articular bodies. It is unclear  whether or not there is synovitis. There is also an effusion in the  talonavicular joint though it is unclear whether there is synovitis  here. (It is difficult to assess for synovitis because of the  susceptibility artifact from micrometallic debris about the prior  surgical region).    Muscles and tendons: There is extensive tendinosis and discontinuity  in the distal aspect of the tibialis anterior tendon at the level of  the distal tibia. There is extensive tendinosis and partial tear in  the posterior tibial tendon in the region of prior screw tract is seen  on image 13 of the axial T1 fat-saturated postgadolinium images  located 2-3 cm cephalad to the tibiotalar arthrodesis site. There is  no evidence of tenosynovitis. Mild to moderate diffuse muscle atrophy.  Mild diffuse muscle edema.    Other soft tissues: Extensive superficial soft tissue edema diffusely.  No focal fluid collection is evident in the soft tissues. No hematoma,  cyst or mass. No evidence of devitalized tissue.      Impression    IMPRESSION:  1. There is a solid tibiotalar arthrodesis. There are tracts from  prior hardware removal. There is moderate to marked marrow edema  throughout the distal tibia and in the talus, particularly the talar  dome and talar head. These findings are concerning for osteomyelitis.  2. There is an effusion and there are intra-articular bodies in the  posterior subtalar joint and there is an effusion in the talonavicular  joint. It is unclear whether or not there is synovitis in these joints  (limited evaluation because of the micrometallic susceptibility  artifact from prior surgery/instrumentation).  3. There is mild muscle edema diffusely, so myositis cannot be ruled  out. There is no evidence of abscess or tenosynovitis.  4. Tendinosis and partial tear of the posterior  tibial tendon proximal  to the arthrodesis in the region of prior screw tract.  5. Tendinosis and discontinuity of the distal aspect of the tibialis  anterior tendon at the level of the distal tibia.  6. Mild to moderate diffuse muscle atrophy.  7. There is extensive superficial soft tissue edema diffusely.    SINAN BENOIT MD   US Lower Extremity Venous Duplex Right    Narrative    US LOWER EXTREMITY VENOUS DUPLEX RIGHT   7/26/2019 9:16 PM     HISTORY: Right lower extremity edema.    COMPARISON: None.    TECHNIQUE: Spectral doppler and waveform analysis were performed on  the right lower extremity veins.    FINDINGS: The right common femoral, femoral, and popliteal veins are  patent, compressible, and negative for deep venous thrombosis. Calf  veins are segmentally seen but appear negative for DVT where  visualized.      Impression    IMPRESSION:  No evidence for deep venous thrombosis in the right lower  extremity veins.    CHHAYA BARAHONA MD       Medications   cefTRIAXone in d5w (ROCEPHIN) intermittent infusion 1 g (0 g Intravenous Stopped 7/26/19 2139)   vancomycin (VANCOCIN) 1,500 mg in sodium chloride 0.9 % 250 mL intermittent infusion (1,500 mg Intravenous New Bag 7/26/19 2151)   oxyCODONE-acetaminophen (PERCOCET) 5-325 MG per tablet 2 tablet (2 tablets Oral Given 7/26/19 1832)   gadobutrol (GADAVIST) injection 8 mL (8 mLs Intravenous Given 7/26/19 2002)   sodium chloride (PF) 0.9% PF flush 10 mL (10 mLs Intracatheter Given 7/26/19 2002)     1805: Patient discussed with Dr. Escobedo, with surgeon.  Concurs with MRI.  He will see the patient tomorrow.    Assessments & Plan (with Medical Decision Making)     59-year-old female status post right ankle arthrodesis in the distant past recently had explantation of hardware because of chronic ankle pain presents with increasing pain and concern for infection.  Inflammatory markers are not particularly elevated but given her warmth and swelling and history we  proceeded to MRI of the ankle which raises concern for both intra-articular infection and osteomyelitis.  Antibiotic therapy was initiated with ceftriaxone and vancomycin.  Her surgeon will see her in the morning.  I discussed her case with Dr. Perkins of the hospital service and they will assume care on admission.  I reviewed the findings with the patient and she is comfortable with the plan for admission and antibiotic therapy and her questions were answered.    I have reviewed the nursing notes.    I have reviewed the findings, diagnosis, plan and need for follow up with the patient.          Medication List      There are no discharge medications for this visit.         Final diagnoses:   Osteomyelitis of right ankle, unspecified type (H)       7/26/2019   [facility] EMERGENCY DEPARTMENT     Greg Odonnell MD  07/26/19 7040

## 2019-07-26 NOTE — ED TRIAGE NOTES
Has hardware taken out of her right ankle in June. Having a lot of pain. Went to her pain MD and was told to come here because it looks infected

## 2019-07-26 NOTE — ED NOTES
June 13th screws in ankle taken out. Unable to bear weight on ankle. Numbness and pain in leg shooting up leg and to hip. Taking Percocet, Oxycodone, Tizanidine,a dn Lyrica for pain of ankle.

## 2019-07-27 LAB
ALBUMIN SERPL-MCNC: 3.3 G/DL (ref 3.4–5)
ALP SERPL-CCNC: 100 U/L (ref 40–150)
ALT SERPL W P-5'-P-CCNC: 16 U/L (ref 0–50)
ANION GAP SERPL CALCULATED.3IONS-SCNC: 4 MMOL/L (ref 3–14)
AST SERPL W P-5'-P-CCNC: 11 U/L (ref 0–45)
BILIRUB SERPL-MCNC: 0.3 MG/DL (ref 0.2–1.3)
BUN SERPL-MCNC: 9 MG/DL (ref 7–30)
CALCIUM SERPL-MCNC: 8.4 MG/DL (ref 8.5–10.1)
CHLORIDE SERPL-SCNC: 111 MMOL/L (ref 94–109)
CO2 SERPL-SCNC: 28 MMOL/L (ref 20–32)
CREAT SERPL-MCNC: 0.58 MG/DL (ref 0.52–1.04)
CRP SERPL-MCNC: 35.1 MG/L (ref 0–8)
ERYTHROCYTE [DISTWIDTH] IN BLOOD BY AUTOMATED COUNT: 13.3 % (ref 10–15)
ERYTHROCYTE [SEDIMENTATION RATE] IN BLOOD BY WESTERGREN METHOD: 15 MM/H (ref 0–30)
GFR SERPL CREATININE-BSD FRML MDRD: >90 ML/MIN/{1.73_M2}
GLUCOSE SERPL-MCNC: 99 MG/DL (ref 70–99)
HCT VFR BLD AUTO: 43.6 % (ref 35–47)
HGB BLD-MCNC: 14 G/DL (ref 11.7–15.7)
MCH RBC QN AUTO: 28.3 PG (ref 26.5–33)
MCHC RBC AUTO-ENTMCNC: 32.1 G/DL (ref 31.5–36.5)
MCV RBC AUTO: 88 FL (ref 78–100)
PLATELET # BLD AUTO: 270 10E9/L (ref 150–450)
POTASSIUM SERPL-SCNC: 3.7 MMOL/L (ref 3.4–5.3)
PROCALCITONIN SERPL-MCNC: <0.05 NG/ML
PROT SERPL-MCNC: 6.6 G/DL (ref 6.8–8.8)
RBC # BLD AUTO: 4.94 10E12/L (ref 3.8–5.2)
SODIUM SERPL-SCNC: 143 MMOL/L (ref 133–144)
WBC # BLD AUTO: 8.2 10E9/L (ref 4–11)

## 2019-07-27 PROCEDURE — 84145 PROCALCITONIN (PCT): CPT | Performed by: FAMILY MEDICINE

## 2019-07-27 PROCEDURE — 25000132 ZZH RX MED GY IP 250 OP 250 PS 637: Performed by: FAMILY MEDICINE

## 2019-07-27 PROCEDURE — 85652 RBC SED RATE AUTOMATED: CPT | Performed by: FAMILY MEDICINE

## 2019-07-27 PROCEDURE — 99233 SBSQ HOSP IP/OBS HIGH 50: CPT

## 2019-07-27 PROCEDURE — 25800030 ZZH RX IP 258 OP 636: Performed by: FAMILY MEDICINE

## 2019-07-27 PROCEDURE — 86140 C-REACTIVE PROTEIN: CPT | Performed by: FAMILY MEDICINE

## 2019-07-27 PROCEDURE — 36415 COLL VENOUS BLD VENIPUNCTURE: CPT | Performed by: FAMILY MEDICINE

## 2019-07-27 PROCEDURE — 85027 COMPLETE CBC AUTOMATED: CPT | Performed by: FAMILY MEDICINE

## 2019-07-27 PROCEDURE — 12000000 ZZH R&B MED SURG/OB

## 2019-07-27 PROCEDURE — 80053 COMPREHEN METABOLIC PANEL: CPT | Performed by: FAMILY MEDICINE

## 2019-07-27 PROCEDURE — 25000128 H RX IP 250 OP 636: Performed by: FAMILY MEDICINE

## 2019-07-27 RX ADMIN — OXYCODONE HYDROCHLORIDE 10 MG: 5 TABLET ORAL at 05:49

## 2019-07-27 RX ADMIN — TIZANIDINE 4 MG: 4 TABLET ORAL at 01:40

## 2019-07-27 RX ADMIN — DICLOFENAC SODIUM 2 G: 10 GEL TOPICAL at 14:19

## 2019-07-27 RX ADMIN — PREGABALIN 100 MG: 100 CAPSULE ORAL at 01:38

## 2019-07-27 RX ADMIN — LEVOTHYROXINE SODIUM 112 MCG: 0.11 TABLET ORAL at 08:15

## 2019-07-27 RX ADMIN — OXYCODONE HYDROCHLORIDE 10 MG: 5 TABLET ORAL at 17:30

## 2019-07-27 RX ADMIN — DICLOFENAC SODIUM 2 G: 10 GEL TOPICAL at 01:45

## 2019-07-27 RX ADMIN — IMIPRAMINE HYDROCHLORIDE 150 MG: 25 TABLET ORAL at 23:10

## 2019-07-27 RX ADMIN — POTASSIUM CHLORIDE 30 MEQ: 750 TABLET, FILM COATED, EXTENDED RELEASE ORAL at 08:15

## 2019-07-27 RX ADMIN — OXYCODONE HYDROCHLORIDE 10 MG: 10 TABLET, FILM COATED, EXTENDED RELEASE ORAL at 01:39

## 2019-07-27 RX ADMIN — AMLODIPINE BESYLATE 10 MG: 10 TABLET ORAL at 08:15

## 2019-07-27 RX ADMIN — Medication 500 MG: at 08:15

## 2019-07-27 RX ADMIN — IMIPRAMINE HYDROCHLORIDE 150 MG: 25 TABLET ORAL at 01:41

## 2019-07-27 RX ADMIN — PREGABALIN 100 MG: 100 CAPSULE ORAL at 14:19

## 2019-07-27 RX ADMIN — MELATONIN 2000 UNITS: at 17:30

## 2019-07-27 RX ADMIN — VANCOMYCIN HYDROCHLORIDE 1500 MG: 10 INJECTION, POWDER, LYOPHILIZED, FOR SOLUTION INTRAVENOUS at 10:17

## 2019-07-27 RX ADMIN — OXYCODONE HYDROCHLORIDE 10 MG: 5 TABLET ORAL at 12:46

## 2019-07-27 RX ADMIN — Medication 500 MG: at 19:48

## 2019-07-27 RX ADMIN — VANCOMYCIN HYDROCHLORIDE 1500 MG: 10 INJECTION, POWDER, LYOPHILIZED, FOR SOLUTION INTRAVENOUS at 23:12

## 2019-07-27 RX ADMIN — OXYCODONE HYDROCHLORIDE 10 MG: 10 TABLET, FILM COATED, EXTENDED RELEASE ORAL at 23:09

## 2019-07-27 RX ADMIN — OXYCODONE HYDROCHLORIDE 10 MG: 5 TABLET ORAL at 20:43

## 2019-07-27 RX ADMIN — Medication 500 MG: at 01:43

## 2019-07-27 RX ADMIN — PRAVASTATIN SODIUM 40 MG: 20 TABLET ORAL at 01:39

## 2019-07-27 RX ADMIN — PREGABALIN 100 MG: 100 CAPSULE ORAL at 08:15

## 2019-07-27 RX ADMIN — CEFTRIAXONE SODIUM 1 G: 1 INJECTION, SOLUTION INTRAVENOUS at 20:44

## 2019-07-27 RX ADMIN — TIZANIDINE 4 MG: 4 TABLET ORAL at 23:08

## 2019-07-27 RX ADMIN — Medication 1 G: at 17:30

## 2019-07-27 RX ADMIN — OXYCODONE HYDROCHLORIDE 10 MG: 5 TABLET ORAL at 09:12

## 2019-07-27 RX ADMIN — PREGABALIN 100 MG: 100 CAPSULE ORAL at 19:48

## 2019-07-27 RX ADMIN — PRAVASTATIN SODIUM 40 MG: 20 TABLET ORAL at 23:08

## 2019-07-27 ASSESSMENT — ACTIVITIES OF DAILY LIVING (ADL)
AMBULATION: 0-->INDEPENDENT
ADLS_ACUITY_SCORE: 12
COGNITION: 0 - NO COGNITION ISSUES REPORTED
DRESS: 0-->INDEPENDENT
SWALLOWING: 0-->SWALLOWS FOODS/LIQUIDS WITHOUT DIFFICULTY
ADLS_ACUITY_SCORE: 12
RETIRED_COMMUNICATION: 0-->UNDERSTANDS/COMMUNICATES WITHOUT DIFFICULTY
BATHING: 0-->INDEPENDENT
TOILETING: 0-->INDEPENDENT
ADLS_ACUITY_SCORE: 12
FALL_HISTORY_WITHIN_LAST_SIX_MONTHS: YES
RETIRED_EATING: 0-->INDEPENDENT
ADLS_ACUITY_SCORE: 12
ADLS_ACUITY_SCORE: 12
NUMBER_OF_TIMES_PATIENT_HAS_FALLEN_WITHIN_LAST_SIX_MONTHS: 3
TRANSFERRING: 0-->INDEPENDENT
ADLS_ACUITY_SCORE: 12
WHICH_OF_THE_ABOVE_FUNCTIONAL_RISKS_HAD_A_RECENT_ONSET_OR_CHANGE?: AMBULATION

## 2019-07-27 NOTE — H&P
[facility] History and Physical    Britni Tavarez MRN# [id number]   Age: 59 year old YOB: 1960     Date of Admission:  7/26/2019      Primary care provider: Chaka [place]          Assessment and Plan:   Assessment & Plan         Possible Osteomyelitis (H) with possible synovitis left ankle   History of remote surgery/hardware placement with hardware removal due to concern of chronic pain and possible infection on 6/13/19, with ongoing trouble with pain, swelling and difficulty walking since that time, now worse past few days/week.  Patient reports increasing pain and swelling although exam shows only slight increase in swelling from admission 3 weeks ago - says it's much worse when she doesn't elevate it.  MRI shows extensive changes including concern for possible osteomyelitis/synovitis among other changes.  Blood cultures sent.  Patient started on rocephin/vanco.  Pain control with oxycodone increased to 5-10 mg every 4 hours prn from home of 1 percocet 3 times daily with dilaudid prn.  Continue home oxycontin.  ER discussed with Dr. Kwon who will see patient tomorrow - plan will be per his recommendations, patient NPO after midnight in case surgery needed tomorrow.   holding home aspirin.        Fibromyalgia/chronic pain    Underlying chronic pain and chronic narcotic use complicates picture but other than left ankle appeaers to be baseline.  Continue home lyrica, zanaflex - oxycontin/oxycodone as above.       Hypokalemia  Has a history of extremely severe hypokalemia in the past - last admission hydrochlorothiazide was stopped and resumed potassium supplement but at just 20 meq daily - potassium at follow-up with primary care provider was stable, now just down to 3.2.  Replace per protocol, increase home potassium to 30 meq daily and continue this while inpatient.        Benign essential hypertension  Blood pressure stable, continue home amlodpine.       Depression  Mood  and affect stable.  Continue home lyrica.        Hypothyroidism (acquired)  TSH normal 2 months ago, Continue home synthroid      Migraine headache  No issues currently, ppain medications as above.       Tobacco abuse  did not want patches or gum/lozenge or assistance with quitting.  Advised that quitting would help with wound healing.       Prophylaxis  mechanical given possible need for surgery.        Lines  PIV       Disposition  Anticipate at least 2-3 days inpatient.                Chief Complaint:   Ankle pain   History is obtained from the patient          History of Present Illness:   This patient is a 59 year old  female with a significant past medical history of fibromyalgia, depression, hypertension and hypothyroidism who presents with increasing ankle pain and concern for infection.  She has a remote history of ankle fusion many years ago and then on 6/13/19 she had explanation of the 4 screws still left due to chronic pain and concern of possible infected hardware.  She had wound dehiscence for which she was seen in clinic on 7/3/19 and was started on levaquin at that time.  She was admitted for hypokalemia the following day - had low suspicion for infection, but levaquin was continued - completed 6 days of this.   Has now been having increasing pain past few days although says the pain's always been there since the surgery - has also had increasing trouble ambulating due to the pain.  Has swelling in the ankle, particularly when it hasn't been elevated for a while.  Says swelling currently pretty good since her leg's been up mostly today.  No fever or chills.  No other new concerns or symptoms.     Smokes 3/4 ppd, no alcohol or illicit drug use.               Past Medical History:   I have reviewed this patient's past medical history.  Patient Active Problem List    Diagnosis Date Noted     Osteomyelitis (H) 07/26/2019     Priority: Medium     Hypokalemia 07/04/2019     Priority: Medium      Recurrent sinus infections 06/06/2019     Priority: Medium     SNHL (sensorineural hearing loss) 04/17/2017     Priority: Medium     Tinnitus, bilateral 04/17/2017     Priority: Medium     Benign essential hypertension 04/17/2017     Priority: Medium     Hypercholesteremia 04/17/2017     Priority: Medium     Fibromyalgia 04/17/2017     Priority: Medium     History of tympanoplasty of right ear 04/17/2017     Priority: Medium     Depression 10/13/2016     Priority: Medium     Midline low back pain without sciatica 06/09/2015     Priority: Medium     Spinal enthesopathy (H) 01/20/2015     Priority: Medium     Pain medication agreement 12/06/2011     Priority: Medium     Overview:   Signed 12/6/11 - OK for #75 Norco (Hydrocodone/Acetaminophen 5/325 mg) per month.  Referral to Pain Clinic made in July 2016, patient says she has an appointment October 2016. In 12/2016 Patient says she went to a pain clinic and they wanted her to come off her blood pressure meds and continue Norco (no record of that visit have been received).  her  checked 3/10/2017 showing compliance with her agreement.  Last urine drug screen 9/2016 shows no unexpected findings.       Hypothyroidism (acquired) 12/13/2005     Priority: Medium     Migraine headache 12/13/2005     Priority: Medium     Overview:   Was on propranolol for prevention but caused falls  Also takes imipramine.  100 mg each night, consider decreasing as HA less frequent as of 12/2016  Neurology referral as of September 2016-didn't go because HA less frequent.       Osteopenia 12/13/2005     Priority: Medium     Overview:   Last DEXA in 2012 (stable), repeat in 2014--Declined in 2014.  Normal vitamin D level x 2.                Past Surgical History:   I have reviewed this patient's past surgical history   Past Surgical History:   Procedure Laterality Date     HYSTERECTOMY       REMOVE HARDWARE ANKLE Right 6/13/2019    Procedure: RIGHT ANKLE: DEEP HARDWARE REMOVAL; SUBTALAR AND  MIDTARSAL JOINT DEBRIDEMENTS;  Surgeon: Nikita Escobedo DPM;  Location: [facility] OR     TYMPANOPLASTY               Social History:   I have reviewed this patient's social history   Social History     Tobacco Use     Smoking status: Current Every Day Smoker     Packs/day: 1.00     Smokeless tobacco: Never Used   Substance Use Topics     Alcohol use: Not Currently     Frequency: Never             Family History:   I have reviewed this patient's family history  Family History   Problem Relation Age of Onset     Cancer Father         Lung              Immunizations:   Immunizations are current          Allergies:     Allergies   Allergen Reactions     Atorvastatin GI Disturbance     Augmentin Hives     Cortisone Other (See Comments)     IM only     Flagyl [Metronidazole] Nausea     Ibuprofen Other (See Comments)     Increased bleeding     Lasix [Furosemide] Other (See Comments)     Morphine Hives     Propranolol Other (See Comments)     Low blood pressure and fainting          Simvastatin Muscle Pain (Myalgia)     Sulfa Drugs Hives     Valtrex [Valacyclovir] Hives     Penicillins Rash     Prednisone Rash             Medications:     Medications Prior to Admission   Medication Sig Dispense Refill Last Dose     amLODIPine (NORVASC) 10 MG tablet Take 1 tablet (10 mg) by mouth daily (Patient taking differently: Take 10 mg by mouth every morning ) 90 tablet 1 Taking     aspirin (ASA) 325 MG EC tablet Take 1 tablet (325 mg) by mouth daily 30 tablet 0 Taking     COCONUT OIL PO Take 500 mg by mouth 2 times daily   Taking     CRANBERRY PO Take 1 capsule by mouth every evening    Taking     diclofenac (VOLTAREN) 1 % topical gel Place onto the skin every 4 hours As needed   Taking     [] doxycycline hyclate (VIBRAMYCIN) 100 MG capsule Take 1 capsule (100 mg) by mouth 2 times daily for 10 days 20 capsule 0      fish oil-omega-3 fatty acids 1000 MG capsule Take 1 g by mouth every evening    Taking     glucosamine-chondroitin  "500-400 MG CAPS per capsule Take 1 capsule by mouth 2 times daily    Taking     imipramine (TOFRANIL) 50 MG tablet Take 3 tablets (150 mg) by mouth At Bedtime 90 tablet 3      levofloxacin (LEVAQUIN) 500 MG tablet Take 1 tablet (500 mg) by mouth daily 5 tablet 0 Taking     levothyroxine (SYNTHROID/LEVOTHROID) 112 MCG tablet Take 1 tablet (112 mcg) by mouth every evening (Patient taking differently: Take 112 mcg by mouth every morning ) 90 tablet 1 Taking     Multiple Vitamins-Minerals (MULTIVITAMIN ADULT PO) Take 1 tablet by mouth daily    Taking     oxyCODONE (OXYCONTIN) 10 MG 12 hr tablet Take 10 mg by mouth At Bedtime    Taking     oxyCODONE-acetaminophen (PERCOCET) 5-325 MG per tablet Take 1 tablet by mouth every 8 hours as needed for moderate to severe pain   Taking     potassium chloride ER (K-DUR/KLOR-CON M) 20 MEQ CR tablet Take 1 tablet (20 mEq) by mouth daily 30 tablet 1 Taking     pravastatin (PRAVACHOL) 40 MG tablet Take 1 tablet (40 mg) by mouth daily 90 tablet 1 Taking     pregabalin (LYRICA) 100 MG capsule Take 100 mg by mouth 3 times daily 0700, 1 pm, 7 pm   Taking     tiZANidine (ZANAFLEX) 4 MG tablet Take 4 mg by mouth At Bedtime May repeat once if unable to sleep   Taking     Turmeric, Curcuma Longa, (TURMERIC ROOT) POWD Take 1 capsule by mouth every evening    Taking     VITAMIN D, CHOLECALCIFEROL, PO Take 2,000 Units by mouth every evening    Taking             Review of Systems:    ROS: 10 point ROS neg other than the symptoms noted above in the HPI.             Physical Exam:   Blood pressure 114/56, pulse 77, temperature 98.7  F (37.1  C), temperature source Oral, resp. rate 16, height 1.575 m (5' 2\"), weight 87.3 kg (192 lb 7.4 oz), SpO2 94 %.  Temperatures:  Current - Temp: 98.2  F (36.8  C); Max - Temp  Av.1  F (36.7  C)  Min: 97.8  F (36.6  C)  Max: 98.2  F (36.8  C)  Respiration range: Resp  Av  Min: 16  Max: 18  Pulse range: Pulse  Av.5  Min: 72  Max: 79  Blood pressure " range: Systolic (24hrs), Av , Min:114 , Max:157   ; Diastolic (24hrs), Av, Min:56, Max:96    Pulse oximetry range: SpO2  Av.3 %  Min: 94 %  Max: 98 %    Intake/Output Summary (Last 24 hours) at 2019  Last data filed at 2019 2139  Gross per 24 hour   Intake 100 ml   Output --   Net 100 ml     EXAM:  General: awake and alert, NAD, oriented x 3  Head: normocephalic  Neck: unremarkable, no lymphadenopathy   HEENT: oropharynx pink and moist    Heart: Regular rate and rhythm, no murmurs, rubs, or gallops  Lungs: clear to auscultation bilaterally with good air movement throughout  Abdomen: soft, non-tender, no masses or organomegaly  Extremities: left lower extremity unremarkable, right lower extremity shows some swelling of lower leg into ankle and foot, trace erythema around incision site, overall appears a bit more swollen than 2 weeks ago and a bit more warm than 2 weeks ago but appearance remains suprisingly good given MRI results.    Skin otherwise unremarkable.             Data:     Results for orders placed or performed during the hospital encounter of 19 (from the past 24 hour(s))   Comprehensive metabolic panel   Result Value Ref Range    Sodium 140 133 - 144 mmol/L    Potassium 3.2 (L) 3.4 - 5.3 mmol/L    Chloride 108 94 - 109 mmol/L    Carbon Dioxide 24 20 - 32 mmol/L    Anion Gap 8 3 - 14 mmol/L    Glucose 93 70 - 99 mg/dL    Urea Nitrogen 11 7 - 30 mg/dL    Creatinine 0.66 0.52 - 1.04 mg/dL    GFR Estimate >90 >60 mL/min/[1.73_m2]    GFR Estimate If Black >90 >60 mL/min/[1.73_m2]    Calcium 9.2 8.5 - 10.1 mg/dL    Bilirubin Total 0.4 0.2 - 1.3 mg/dL    Albumin 4.0 3.4 - 5.0 g/dL    Protein Total 8.2 6.8 - 8.8 g/dL    Alkaline Phosphatase 118 40 - 150 U/L    ALT 19 0 - 50 U/L    AST 13 0 - 45 U/L   CBC with platelets differential   Result Value Ref Range    WBC 8.9 4.0 - 11.0 10e9/L    RBC Count 5.26 (H) 3.8 - 5.2 10e12/L    Hemoglobin 15.1 11.7 - 15.7 g/dL    Hematocrit 47.1  (H) 35.0 - 47.0 %    MCV 90 78 - 100 fl    MCH 28.7 26.5 - 33.0 pg    MCHC 32.1 31.5 - 36.5 g/dL    RDW 13.4 10.0 - 15.0 %    Platelet Count 270 150 - 450 10e9/L    Diff Method Automated Method     % Neutrophils 50.8 %    % Lymphocytes 39.3 %    % Monocytes 6.6 %    % Eosinophils 2.1 %    % Basophils 0.9 %    % Immature Granulocytes 0.3 %    Nucleated RBCs 0 0 /100    Absolute Neutrophil 4.5 1.6 - 8.3 10e9/L    Absolute Lymphocytes 3.5 0.8 - 5.3 10e9/L    Absolute Monocytes 0.6 0.0 - 1.3 10e9/L    Absolute Eosinophils 0.2 0.0 - 0.7 10e9/L    Absolute Basophils 0.1 0.0 - 0.2 10e9/L    Abs Immature Granulocytes 0.0 0 - 0.4 10e9/L    Absolute Nucleated RBC 0.0    CRP inflammation   Result Value Ref Range    CRP Inflammation 35.0 (H) 0.0 - 8.0 mg/L   Erythrocyte sedimentation rate auto   Result Value Ref Range    Sed Rate 14 0 - 30 mm/h   MR Ankle Right w/o & w Contrast    Narrative    MR ANKLE RIGHT WITHOUT AND WITH CONTRAST    7/26/2019 8:04 PM     HISTORY: Chronic right ankle pain. Explantation of hardware June 13.  Concern for osteomyelitis.    COMPARISON: Radiographs from 7/4/2019.    TECHNIQUE: Sagittal T1 and T2 axial T1 fat saturated pre and  postgadolinium, axial STIR, and coronal T1 fat-saturated pre and  postgadolinium.    FINDINGS:    Bones and joints: Postoperative changes from partial distal fibular  shaft resection and tibiotalar arthrodesis. The arthrodesis screws  have been removed. No hardware remains. There is solid bony union at  the arthrodesis. However, there is moderate to marked marrow edema and  enhancement in the distal tibia and throughout the talus, particularly  the talar dome and talar head.    Moderate degenerative changes in the posterior subtalar joint.  Mild-to-moderate degenerative changes in the middle facet of the  subtalar joint. Mild degenerative changes at the talonavicular joint  and calcaneocuboid joint. There is an effusion in the posterior  subtalar joint and there are  intra-articular bodies. It is unclear  whether or not there is synovitis. There is also an effusion in the  talonavicular joint though it is unclear whether there is synovitis  here. (It is difficult to assess for synovitis because of the  susceptibility artifact from micrometallic debris about the prior  surgical region).    Muscles and tendons: There is extensive tendinosis and discontinuity  in the distal aspect of the tibialis anterior tendon at the level of  the distal tibia. There is extensive tendinosis and partial tear in  the posterior tibial tendon in the region of prior screw tract is seen  on image 13 of the axial T1 fat-saturated postgadolinium images  located 2-3 cm cephalad to the tibiotalar arthrodesis site. There is  no evidence of tenosynovitis. Mild to moderate diffuse muscle atrophy.  Mild diffuse muscle edema.    Other soft tissues: Extensive superficial soft tissue edema diffusely.  No focal fluid collection is evident in the soft tissues. No hematoma,  cyst or mass. No evidence of devitalized tissue.      Impression    IMPRESSION:  1. There is a solid tibiotalar arthrodesis. There are tracts from  prior hardware removal. There is moderate to marked marrow edema  throughout the distal tibia and in the talus, particularly the talar  dome and talar head. These findings are concerning for osteomyelitis.  2. There is an effusion and there are intra-articular bodies in the  posterior subtalar joint and there is an effusion in the talonavicular  joint. It is unclear whether or not there is synovitis in these joints  (limited evaluation because of the micrometallic susceptibility  artifact from prior surgery/instrumentation).  3. There is mild muscle edema diffusely, so myositis cannot be ruled  out. There is no evidence of abscess or tenosynovitis.  4. Tendinosis and partial tear of the posterior tibial tendon proximal  to the arthrodesis in the region of prior screw tract.  5. Tendinosis and  discontinuity of the distal aspect of the tibialis  anterior tendon at the level of the distal tibia.  6. Mild to moderate diffuse muscle atrophy.  7. There is extensive superficial soft tissue edema diffusely.    SINAN BENOIT MD   US Lower Extremity Venous Duplex Right    Narrative    US LOWER EXTREMITY VENOUS DUPLEX RIGHT   7/26/2019 9:16 PM     HISTORY: Right lower extremity edema.    COMPARISON: None.    TECHNIQUE: Spectral doppler and waveform analysis were performed on  the right lower extremity veins.    FINDINGS: The right common femoral, femoral, and popliteal veins are  patent, compressible, and negative for deep venous thrombosis. Calf  veins are segmentally seen but appear negative for DVT where  visualized.      Impression    IMPRESSION:  No evidence for deep venous thrombosis in the right lower  extremity veins.    CHHAYA BARAHONA MD       All imaging studies reviewed by me.    Attestation:  I have reviewed today's vital signs, notes, medications, labs and imaging.  Amount of time performed on this history and physical: 60 minutes.        Aníbal Reyes MD

## 2019-07-27 NOTE — PROGRESS NOTES
Skin affirmation note    Admitting nurse completed full skin assessment. This writer agrees with the initial skin assessment findings. Noted partially healed incisions to R ankle.

## 2019-07-27 NOTE — PROGRESS NOTES
"[facility] ADMISSION NOTE    Patient admitted to room 2301 at approximately 2300 via cart from emergency room. Patient was accompanied by transport tech.     Verbal SBAR report received from RN prior to patient arrival.     Patient ambulated to bed with stand-by assist. Patient alert and oriented X 3. The patient is not having any pain. 0-10 Pain Scale: 9. Admission vital signs: Blood pressure 114/56, pulse 77, temperature 98.7  F (37.1  C), temperature source Oral, resp. rate 16, height 1.575 m (5' 2\"), weight 87.3 kg (192 lb 7.4 oz), SpO2 94 %. Patient was oriented to plan of care, call light, bed controls, tv, telephone, bathroom and visiting hours.     Risk Assessment    The following safety risks were identified during admission: none. Yellow risk band applied: YES.     Skin Initial Assessment    This writer admitted this patient and completed a full skin assessment and Sony score in the Adult PCS flowsheet. Appropriate interventions initiated as needed.     Secondary skin check completed by Germaine DC.         Merlyn Bridges    "

## 2019-07-27 NOTE — PLAN OF CARE
Pt reports satisfactory control of RLE pain w/ combined use of MS contin, oxycodone, & Voltaren cream prn. Refused PCDs, stating they would aggravate her pain more. Demonstrated ankle pumps & was up ambulating in [place] w/ daughter once this am. Pt does have some edema to that RLE, worse after she's been up a while & better after lying down & elevating it on pillows.

## 2019-07-27 NOTE — PROGRESS NOTES
[facility] Medicine Progress Note  Date of Service: 07/27/2019    Assessment & Plan   Britni Tavarez is a 59 year old female who presented on 7/26/2019 with left ankle pain.      Subacute osteomyelitis right ankle (H) associated with previous ankle hardware  History of remote surgery/hardware placement, with hardware removal 6/13/19 due to concern of chronic pain and possible infection.  Has had ongoing trouble with pain, swelling, and difficulty walking since that time, particularly worse over the past few days.  MRI 7/26/19 shows multiple abnormalities:    IMPRESSION:  1. There is a solid tibiotalar arthrodesis. There are tracts from  prior hardware removal. There is moderate to marked marrow edema  throughout the distal tibia and in the talus, particularly the talar  dome and talar head. These findings are concerning for osteomyelitis.  2. There is an effusion and there are intra-articular bodies in the  posterior subtalar joint and there is an effusion in the talonavicular  joint. It is unclear whether or not there is synovitis in these joints  (limited evaluation because of the micrometallic susceptibility  artifact from prior surgery/instrumentation).  3. There is mild muscle edema diffusely, so myositis cannot be ruled  out. There is no evidence of abscess or tenosynovitis.  4. Tendinosis and partial tear of the posterior tibial tendon proximal  to the arthrodesis in the region of prior screw tract.  5. Tendinosis and discontinuity of the distal aspect of the tibialis  anterior tendon at the level of the distal tibia.  6. Mild to moderate diffuse muscle atrophy.  7. There is extensive superficial soft tissue edema diffusely.    These changes include concern for possible osteomyelitis.  Blood cultures 7/26/19 negative to date.  Started empirically on CTAX and vanco.  Afebrile, feeling well aside from ankle pain.  ER discussed with Dr. Escobedo, Podiatry, who will see  patient today.  She remains NPO, and ha been since midnight, in case surgery to be performed today.    -Current analgesia oxycodone 5-10 mg every 4 hours PRN (which is an increase from home regimen of 1 percocet 3 times daily).  Hydromorphone is available PRN but hasn't been required.  Also continues on preadmission Oxycontin and pregabalin.   -Hold ASA in anticipation of surgery.  -Continue empiric CTAX and vanco day #2.         Fibromyalgia/chronic pain   Underlying chronic pain and chronic narcotic use: preadmission regimen oxycodone/acetaminophen 5/325 mg PO Q8H PRN, Oxycontin 20 mg at bedtime, pregabalin  100 mg TID, and tizanidine 4 mg at bedtime.  Aside from increased right ankle pain, remaining pain complaints are at baseline.     -Continue home regimen above, though oxycodone dose and frequency increased.       Hypokalemia  Has a history of extremely severe hypokalemia in the past.  Admission K 3.2 --> 3.7 today after PO replacement.    -KCl 30 mEQ every day while here.  This is an increase from preadmission dose of 20 mEq  QD.       Benign essential hypertension  BP control here has been good.  -Continue home amlodpine.        Depression  Mood and affect stable.    -Continue preadmission imipramine and pregabalin.       Hypothyroidism (acquired)  Clinically euthyroid, TSH normal 2 months ago.  -Continue preadmission levothyroxine.       Migraine headache  Asymptomatic recently as well as since admission.      Tobacco abuse  Does not want nicotine supplements or assistance with quitting.  Advised that quitting would help with wound healing.  Has been going outside to smoke.          DVT Prophylaxis: Pneumatic Compression Devices until able to ambulate.  Code Status: Full code.    Lines: Peripheral.   Adrian catheter: None.  Restraints: None.    Discussion: Awaiting orthopedic podiatry opinion regarding possible surgery to the right ankle.  Current analgesia is effective.  She is afebrile, has a normal white  count, and blood cultures are negative to date, on empiric vancomycin and ceftriaxone.    Disposition: Anticipate discharge in 2 to 3 additional days.     Attestation:  I have reviewed today's vital signs, notes, medications, labs and imaging.  Amount of time performed on this follow-up visit: 40 minutes.    Sean Combs MD       Interval History   Reports pain control is generally good with regard to the right ankle.  Her remaining somatic pain complaints are at baseline, and well-controlled.  She denies any subjective fever or chills.  She has remained n.p.o. except for ice chips, in anticipation of possible OR later today.    ROS:  CONSTITUTIONAL: NEGATIVE for chills, fever, sweats.  EYES: NEGATIVE for visual changes, eye irritation.  ENT/MOUTH: NEGATIVE for nasal congestion, postnasal drainage or sinus pressure  RESP: NEGATIVE for dyspnea, productive cough, wheeze, or respiratory chest pain.   CV: NEGATIVE for chest pain, palpitations, orthopnea.  GI: NEGATIVE for difficulties swallowing, abdominal pain, diarrhea, nausea or vomiting.  : NEGATIVE for difficulties urinating or incontinence.  MUSCULOSKELETAL: NEGATIVE for back pain, and aside from right ankle has no current joint pain or joint swelling.  NEURO: NEGATIVE for focal numbness or weakness, syncope, stroke or seizure.  PSYCHIATRIC: Notable for depression, recently stable.       Physical Exam   Temp:  [97.8  F (36.6  C)-98.7  F (37.1  C)] 98.2  F (36.8  C)  Pulse:  [62-79] 63  Heart Rate:  [72] 72  Resp:  [16-18] 16  BP: ()/(52-96) 118/59  SpO2:  [90 %-98 %] 90 %    Weights:   Vitals:    07/26/19 1732 07/26/19 2259   Weight: 86 kg (189 lb 9.5 oz) 87.3 kg (192 lb 7.4 oz)    Body mass index is 35.2 kg/m .    GENERAL: Pleasant woman, sitting up in bed, looks comfortable.  EYES: Eyes grossly normal to inspection, extraocular movements intact  HENT: Nares patent bilaterally.  Nasal mucosa normal, no discharge.    NECK: Trachea midline, no  stridor.    RESP: No accessory muscle use.  Symmetrically distant breath sounds to a moderate degree.  Lungs clear throughout on inspiration.  Expiration mildly to moderately prolonged, no wheeze.  CV: Regular rate and rhythm, non-tachycardic.  Normal S1 S2, no murmur or extra sound.  No lower extremity edema.  ABDOMEN: Soft, non-tender, no guarding.  Liver and spleen not palpable, no masses palpable.  Bowel sounds positive.  MS: There are curvilinear, healed incisions on both the medial and lateral malleolus of the right ankle.  The right ankle is swollen as compared to the left, mildly warm, but not erythematous.  I cannot palpate any fluctuance in the joint or in the foot dorsum.  SKIN: Warm and dry, no rashes.  NEURO: Alert, oriented, conversant.  Cranial nerves III - XII grossly intact.  No gross motor or sensory deficits.    PSYCH: Calm, alert, conversant.  Able to articulate logical thoughts, no tangential thoughts, no hallucinations or delusions.  Affect normal.        Data   Recent Labs   Lab 07/27/19  0538 07/26/19  1632   WBC 8.2 8.9   HGB 14.0 15.1   MCV 88 90    270    140   POTASSIUM 3.7 3.2*   CHLORIDE 111* 108   CO2 28 24   BUN 9 11   CR 0.58 0.66   ANIONGAP 4 8   ANNE MARIE 8.4* 9.2   GLC 99 93   ALBUMIN 3.3* 4.0   PROTTOTAL 6.6* 8.2   BILITOTAL 0.3 0.4   ALKPHOS 100 118   ALT 16 19   AST 11 13       Recent Labs   Lab 07/27/19  0538 07/26/19  1632   GLC 99 93        Unresulted Labs Ordered in the Past 30 Days of this Admission     Date and Time Order Name Status Description    7/26/2019 2125 Blood culture Preliminary     7/26/2019 2125 Blood culture Preliminary            Imaging  Recent Results (from the past 24 hour(s))   MR Ankle Right w/o & w Contrast    Narrative    MR ANKLE RIGHT WITHOUT AND WITH CONTRAST    7/26/2019 8:04 PM     HISTORY: Chronic right ankle pain. Explantation of hardware June 13.  Concern for osteomyelitis.    COMPARISON: Radiographs from 7/4/2019.    TECHNIQUE:  Sagittal T1 and T2 axial T1 fat saturated pre and  postgadolinium, axial STIR, and coronal T1 fat-saturated pre and  postgadolinium.    FINDINGS:    Bones and joints: Postoperative changes from partial distal fibular  shaft resection and tibiotalar arthrodesis. The arthrodesis screws  have been removed. No hardware remains. There is solid bony union at  the arthrodesis. However, there is moderate to marked marrow edema and  enhancement in the distal tibia and throughout the talus, particularly  the talar dome and talar head.    Moderate degenerative changes in the posterior subtalar joint.  Mild-to-moderate degenerative changes in the middle facet of the  subtalar joint. Mild degenerative changes at the talonavicular joint  and calcaneocuboid joint. There is an effusion in the posterior  subtalar joint and there are intra-articular bodies. It is unclear  whether or not there is synovitis. There is also an effusion in the  talonavicular joint though it is unclear whether there is synovitis  here. (It is difficult to assess for synovitis because of the  susceptibility artifact from micrometallic debris about the prior  surgical region).    Muscles and tendons: There is extensive tendinosis and discontinuity  in the distal aspect of the tibialis anterior tendon at the level of  the distal tibia. There is extensive tendinosis and partial tear in  the posterior tibial tendon in the region of prior screw tract is seen  on image 13 of the axial T1 fat-saturated postgadolinium images  located 2-3 cm cephalad to the tibiotalar arthrodesis site. There is  no evidence of tenosynovitis. Mild to moderate diffuse muscle atrophy.  Mild diffuse muscle edema.    Other soft tissues: Extensive superficial soft tissue edema diffusely.  No focal fluid collection is evident in the soft tissues. No hematoma,  cyst or mass. No evidence of devitalized tissue.      Impression    IMPRESSION:  1. There is a solid tibiotalar arthrodesis. There  are tracts from  prior hardware removal. There is moderate to marked marrow edema  throughout the distal tibia and in the talus, particularly the talar  dome and talar head. These findings are concerning for osteomyelitis.  2. There is an effusion and there are intra-articular bodies in the  posterior subtalar joint and there is an effusion in the talonavicular  joint. It is unclear whether or not there is synovitis in these joints  (limited evaluation because of the micrometallic susceptibility  artifact from prior surgery/instrumentation).  3. There is mild muscle edema diffusely, so myositis cannot be ruled  out. There is no evidence of abscess or tenosynovitis.  4. Tendinosis and partial tear of the posterior tibial tendon proximal  to the arthrodesis in the region of prior screw tract.  5. Tendinosis and discontinuity of the distal aspect of the tibialis  anterior tendon at the level of the distal tibia.  6. Mild to moderate diffuse muscle atrophy.  7. There is extensive superficial soft tissue edema diffusely.    SINAN BENOIT MD   US Lower Extremity Venous Duplex Right    Narrative    US LOWER EXTREMITY VENOUS DUPLEX RIGHT   7/26/2019 9:16 PM     HISTORY: Right lower extremity edema.    COMPARISON: None.    TECHNIQUE: Spectral doppler and waveform analysis were performed on  the right lower extremity veins.    FINDINGS: The right common femoral, femoral, and popliteal veins are  patent, compressible, and negative for deep venous thrombosis. Calf  veins are segmentally seen but appear negative for DVT where  visualized.      Impression    IMPRESSION:  No evidence for deep venous thrombosis in the right lower  extremity veins.    CHHAYA BARAHONA MD        I reviewed all new labs and imaging results over the last 24 hours. I personally reviewed no images or EKG's today.    Medications       amLODIPine  10 mg Oral QAM     cefTRIAXone  1 g Intravenous Q24H     fish oil-omega-3 fatty acids  1 g Oral QPM      glucosamine  500 mg Oral BID     imipramine  150 mg Oral At Bedtime     levothyroxine  112 mcg Oral QAM AC     oxyCODONE  10 mg Oral At Bedtime     potassium chloride ER  30 mEq Oral Daily     pravastatin  40 mg Oral At Bedtime     pregabalin  100 mg Oral TID     tiZANidine  4 mg Oral At Bedtime     vancomycin (VANCOCIN) IV  1,500 mg Intravenous Q12H     vitamin D3  2,000 Units Oral QPM       Sean Combs MD

## 2019-07-27 NOTE — PHARMACY-VANCOMYCIN DOSING SERVICE
Pharmacy Vancomycin Initial Note  Date of Service 2019  Patient's  1960  59 year old, female    Indication: Osteomyelitis    Current estimated CrCl = Estimated Creatinine Clearance: 94.6 mL/min (based on SCr of 0.66 mg/dL).    Creatinine for last 3 days  2019:  4:32 PM Creatinine 0.66 mg/dL    Recent Vancomycin Level(s) for last 3 days  No results found for requested labs within last 72 hours.      Vancomycin IV Administrations (past 72 hours)      No vancomycin orders with administrations in past 72 hours.                Nephrotoxins and other renal medications (From now, onward)    Start     Dose/Rate Route Frequency Ordered Stop    19  vancomycin (VANCOCIN) 1,500 mg in sodium chloride 0.9 % 250 mL intermittent infusion      1,500 mg  over 90 Minutes Intravenous EVERY 12 HOURS 19            Contrast Orders - past 72 hours (72h ago, onward)    Start     Dose/Rate Route Frequency Ordered Stop    19  gadobutrol (GADAVIST) injection 8 mL      8 mL Intravenous ONCE 19                Plan:  1.  Start vancomycin  1500 mg IV q12h.   2.  Goal Trough Level: 15-20 mg/L   3.  Pharmacy will check trough levels as appropriate in 1-3 Days.    4. Serum creatinine levels will be ordered daily for the first week of therapy and at least twice weekly for subsequent weeks.    5. Walton method utilized to dose vancomycin therapy: based on Obese Dosing wt of ~ 70 kg    Estefania De Oliveira

## 2019-07-27 NOTE — PHARMACY-MEDICATION REGIMEN REVIEW
"The following home medications were NOT continued on inpatient admission per \"Discontinuation of nonessential home medications during hospitalization\" policy: Cranberry and Coconut Capsule    If a therapeutic holiday is deemed inappropriate per the prescriber, please notify the pharmacist regarding the medication order.    The pharmacist is available to answer any questions and/or concerns the patient may have regarding discontinuation of non-essential medications.    Please ensure that these medications are restarted as needed upon discharge via the medication reconciliation discharge process and included on the discharge medication reconciliation report.    Thank you,  Angela Iverson  "

## 2019-07-27 NOTE — PLAN OF CARE
Patient continues to request oxycodone for right foot pain. Told by charge nurse, Nahun that Dr. Faith is supposed to see patient tonight. Patient aware. Had complained of headache and possibly due to lack of caffeine. Drinking mountain dew.

## 2019-07-27 NOTE — PROGRESS NOTES
Patient skin check shows patient has scabs on right foot and old incision/new incisions on right foot. Patient reports she had surgery June of 2019 on right foot.pt states this has been a ongoing since 1981 right foot issues.

## 2019-07-28 LAB — VANCOMYCIN SERPL-MCNC: 13.1 MG/L

## 2019-07-28 PROCEDURE — 25000128 H RX IP 250 OP 636

## 2019-07-28 PROCEDURE — 25800030 ZZH RX IP 258 OP 636

## 2019-07-28 PROCEDURE — 12000000 ZZH R&B MED SURG/OB

## 2019-07-28 PROCEDURE — 25000128 H RX IP 250 OP 636: Performed by: FAMILY MEDICINE

## 2019-07-28 PROCEDURE — 25000132 ZZH RX MED GY IP 250 OP 250 PS 637: Performed by: FAMILY MEDICINE

## 2019-07-28 PROCEDURE — 36415 COLL VENOUS BLD VENIPUNCTURE: CPT

## 2019-07-28 PROCEDURE — 99232 SBSQ HOSP IP/OBS MODERATE 35: CPT

## 2019-07-28 PROCEDURE — 80202 ASSAY OF VANCOMYCIN: CPT

## 2019-07-28 RX ORDER — CEFAZOLIN SODIUM 1 G/50ML
1750 SOLUTION INTRAVENOUS EVERY 12 HOURS
Status: DISCONTINUED | OUTPATIENT
Start: 2019-07-28 | End: 2019-07-29 | Stop reason: CLARIF

## 2019-07-28 RX ORDER — CEFAZOLIN SODIUM 1 G/50ML
1750 SOLUTION INTRAVENOUS EVERY 12 HOURS
Status: DISCONTINUED | OUTPATIENT
Start: 2019-07-28 | End: 2019-07-28

## 2019-07-28 RX ADMIN — OXYCODONE HYDROCHLORIDE 10 MG: 5 TABLET ORAL at 11:10

## 2019-07-28 RX ADMIN — POTASSIUM CHLORIDE 30 MEQ: 750 TABLET, FILM COATED, EXTENDED RELEASE ORAL at 07:43

## 2019-07-28 RX ADMIN — OXYCODONE HYDROCHLORIDE 10 MG: 5 TABLET ORAL at 00:07

## 2019-07-28 RX ADMIN — TIZANIDINE 4 MG: 4 TABLET ORAL at 23:36

## 2019-07-28 RX ADMIN — Medication 500 MG: at 07:45

## 2019-07-28 RX ADMIN — Medication 1 G: at 17:21

## 2019-07-28 RX ADMIN — CEFTRIAXONE SODIUM 1 G: 1 INJECTION, SOLUTION INTRAVENOUS at 21:20

## 2019-07-28 RX ADMIN — OXYCODONE HYDROCHLORIDE 10 MG: 5 TABLET ORAL at 07:43

## 2019-07-28 RX ADMIN — AMLODIPINE BESYLATE 10 MG: 10 TABLET ORAL at 07:44

## 2019-07-28 RX ADMIN — PRAVASTATIN SODIUM 40 MG: 20 TABLET ORAL at 23:35

## 2019-07-28 RX ADMIN — VANCOMYCIN HYDROCHLORIDE 1750 MG: 1 INJECTION, POWDER, LYOPHILIZED, FOR SOLUTION INTRAVENOUS at 23:36

## 2019-07-28 RX ADMIN — OXYCODONE HYDROCHLORIDE 10 MG: 5 TABLET ORAL at 18:24

## 2019-07-28 RX ADMIN — Medication 500 MG: at 20:02

## 2019-07-28 RX ADMIN — HYDROMORPHONE HYDROCHLORIDE 0.5 MG: 1 INJECTION, SOLUTION INTRAMUSCULAR; INTRAVENOUS; SUBCUTANEOUS at 21:20

## 2019-07-28 RX ADMIN — PREGABALIN 100 MG: 100 CAPSULE ORAL at 07:44

## 2019-07-28 RX ADMIN — OXYCODONE HYDROCHLORIDE 10 MG: 5 TABLET ORAL at 14:11

## 2019-07-28 RX ADMIN — HYDROMORPHONE HYDROCHLORIDE 0.5 MG: 1 INJECTION, SOLUTION INTRAMUSCULAR; INTRAVENOUS; SUBCUTANEOUS at 16:24

## 2019-07-28 RX ADMIN — DICLOFENAC SODIUM 2 G: 10 GEL TOPICAL at 07:46

## 2019-07-28 RX ADMIN — IMIPRAMINE HYDROCHLORIDE 150 MG: 25 TABLET ORAL at 23:39

## 2019-07-28 RX ADMIN — OXYCODONE HYDROCHLORIDE 10 MG: 5 TABLET ORAL at 03:38

## 2019-07-28 RX ADMIN — VANCOMYCIN HYDROCHLORIDE 1750 MG: 1 INJECTION, POWDER, LYOPHILIZED, FOR SOLUTION INTRAVENOUS at 11:10

## 2019-07-28 RX ADMIN — MELATONIN 2000 UNITS: at 17:21

## 2019-07-28 RX ADMIN — OXYCODONE HYDROCHLORIDE 10 MG: 10 TABLET, FILM COATED, EXTENDED RELEASE ORAL at 23:36

## 2019-07-28 RX ADMIN — PREGABALIN 100 MG: 100 CAPSULE ORAL at 20:01

## 2019-07-28 RX ADMIN — LEVOTHYROXINE SODIUM 112 MCG: 0.11 TABLET ORAL at 07:44

## 2019-07-28 RX ADMIN — PREGABALIN 100 MG: 100 CAPSULE ORAL at 13:28

## 2019-07-28 ASSESSMENT — PAIN DESCRIPTION - DESCRIPTORS: DESCRIPTORS: ACHING;CONSTANT

## 2019-07-28 ASSESSMENT — ACTIVITIES OF DAILY LIVING (ADL)
ADLS_ACUITY_SCORE: 12

## 2019-07-28 NOTE — PLAN OF CARE
Patient placed herself NPO after midnight as she is hoping that Dr. Faith will take her to surgery today. He is here now and in patient's room discussing plan of care. Continues to complain of right foot pain with some edema and healing incisions. No drainage or redness noted. Taking oxycodone. Has been walking outside to smoke frequently and refusing offer of a w/c. Vitals stable.

## 2019-07-28 NOTE — PLAN OF CARE
Right foot pain continues.  Taking long acting Oxycontin and PRN oxycodone.  Patient reports that pain level has decreased, is currently about a 7/10.  Independent in room, up in kirk, steady when up.

## 2019-07-28 NOTE — PROGRESS NOTES
[facility] Medicine Progress Note  Date of Service: 07/28/2019    Assessment & Plan   Britni Tavarez is a 59 year old female who presented on 7/26/2019 with left ankle pain.    Subacute osteomyelitis (H) right ankle associated with previous ankle hardware   History of remote surgery/hardware placement, with hardware removal 6/13/19 due to concern of chronic pain and possible infection.  Has had ongoing trouble with pain, swelling, and difficulty walking since that time, particularly worse over the past few days.  MRI 7/26/19 shows multiple abnormalities:    IMPRESSION:  1. There is a solid tibiotalar arthrodesis. There are tracts from  prior hardware removal. There is moderate to marked marrow edema  throughout the distal tibia and in the talus, particularly the talar  dome and talar head. These findings are concerning for osteomyelitis.  2. There is an effusion and there are intra-articular bodies in the  posterior subtalar joint and there is an effusion in the talonavicular  joint. It is unclear whether or not there is synovitis in these joints  (limited evaluation because of the micrometallic susceptibility  artifact from prior surgery/instrumentation).  3. There is mild muscle edema diffusely, so myositis cannot be ruled  out. There is no evidence of abscess or tenosynovitis.  4. Tendinosis and partial tear of the posterior tibial tendon proximal  to the arthrodesis in the region of prior screw tract.  5. Tendinosis and discontinuity of the distal aspect of the tibialis  anterior tendon at the level of the distal tibia.  6. Mild to moderate diffuse muscle atrophy.  7. There is extensive superficial soft tissue edema diffusely.    These changes suggest osteomyelitis.  Blood cultures 7/26/19 remain negative to date.  Started empirically on CTAX and vanco, day #3.  Afebrile, feeling well aside from ankle pain.  Dr. Escobedo, podiatry, has had a chance to evaluate the  "patient, and he and I spoke after his visit.  Dr. Escobedo is confident that the patient does indeed have osteomyelitis.  How to treat it is less clear.  Specifically, we have no culture data to guide antibiotic therapy.  The surgical specimens from 6/13/2019 have shown no growth, either in the aerobic or anaerobic bottles.  Cultures obtained from the \"right ankle wound\" from 7/8/2019 are no growth.  Blood cultures from 7/26/2019 are no growth to date.  Therefore, although antibiotic therapy is certainly necessary for the treatment of this osteomyelitis, we do not know whether oral versus IV therapy is necessary, which drug or drugs are needed, and how long IV therapy should be continued.  I am going to see if I can obtain an infectious disease consultation by phone tomorrow, 7/29/2019, to ask these questions.  I think the infectious disease team might advise that we obtain tissue and bone cultures to guide therapy.  If that is the case, I would certainly discuss it with Dr. Escobedo.  I would also want to know if we should allow the patient to have an \"antibiotic free\" period prior to obtaining the samples to increase the likelihood of a positive culture result.  I discussed all of the above with the patient, her sister, and her son-in-law today.  They expressed agreement with this plan.  -Current analgesia oxycodone 5-10 mg every 4 hours PRN (which is an increase from home regimen of 1 percocet 3 times daily).  Hydromorphone is available PRN but hasn't been required.  Also continues on preadmission Oxycontin and pregabalin.   -Hold ASA in anticipation of surgery.  -Continue empiric CTAX and vanco day #2.         Fibromyalgia/chronic pain   Underlying chronic pain and chronic narcotic use: preadmission regimen oxycodone/acetaminophen 5/325 mg PO Q8H PRN, Oxycontin 20 mg at bedtime, pregabalin  100 mg TID, and tizanidine 4 mg at bedtime.  Aside from increased right ankle pain, remaining pain complaints are at baseline.   "   -Continue home regimen above, though oxycodone dose and frequency increased.       Hypokalemia  Has a history of extremely severe hypokalemia in the past.  Admission K 3.2 --> yesterday after PO replacement.    -KCl 30 mEQ every day while here.  This is an increase from preadmission dose of 20 mEq  QD.  -Recheck K tomorrow.       Benign essential hypertension  BP control here has been good.  -Continue home amlodpine.        Depression  Mood and affect stable.    -Continue preadmission imipramine and pregabalin.       Hypothyroidism (acquired)  Clinically euthyroid, TSH normal 2 months ago.  -Continue preadmission levothyroxine.       Migraine headache  Asymptomatic recently as well as since admission.      Tobacco abuse  Does not want nicotine supplements or assistance with quitting.  Advised that quitting would help with wound healing.  Has been going outside to smoke.          DVT Prophylaxis: Pneumatic Compression Devices until able to ambulate.  Code Status: Full code.    Lines: Peripheral.   Adrian catheter: None.  Restraints: None.    Discussion: Empiric ceftriaxone and Vanco continue.  Current analgesia is effective.  She is afebrile, has a normal white count, and blood cultures are negative to date.  I am going to see if I can get a phone consultation with infectious disease tomorrow to discuss long-term antibiotic therapy plans, and whether tissue cultures are necessary to guide therapy.    Disposition: Anticipate discharge in 2 to 3 additional days.     Attestation:  I have reviewed today's vital signs, notes, medications, labs and imaging.  Amount of time performed on this follow-up visit: 30 minutes.    Sean Combs MD       Interval History   Reports pain control is generally good with regard to the right ankle.  Her remaining somatic pain complaints are at baseline, and well-controlled.  She is eating fine, without nausea or vomiting.    ROS:  CONSTITUTIONAL: NEGATIVE for chills, fever,  sweats.  EYES: NEGATIVE for visual changes, eye irritation.  ENT/MOUTH: NEGATIVE for nasal congestion, postnasal drainage or sinus pressure  RESP: NEGATIVE for dyspnea, productive cough, wheeze, or respiratory chest pain.   CV: NEGATIVE for chest pain, palpitations, orthopnea.  GI: NEGATIVE for difficulties swallowing, abdominal pain, diarrhea, nausea or vomiting.  : NEGATIVE for difficulties urinating or incontinence.  MUSCULOSKELETAL: NEGATIVE for back pain, and aside from right ankle has no current joint pain or joint swelling.  NEURO: NEGATIVE for focal numbness or weakness, syncope, stroke or seizure.  PSYCHIATRIC: Notable for depression, recently stable.       Physical Exam   Temp:  [97.9  F (36.6  C)-98.4  F (36.9  C)] 98  F (36.7  C)  Pulse:  [71-82] 71  Heart Rate:  [62-82] 62  Resp:  [16-18] 18  BP: (113-136)/(52-72) 114/55  SpO2:  [92 %-94 %] 94 %    Weights:   Vitals:    07/26/19 1732 07/26/19 2259   Weight: 86 kg (189 lb 9.5 oz) 87.3 kg (192 lb 7.4 oz)    Body mass index is 35.2 kg/m .    GENERAL: Pleasant woman, sitting up in bed, looks comfortable.  EYES: Eyes grossly normal to inspection, extraocular movements intact  HENT: Nares patent bilaterally.  Nasal mucosa normal, no discharge.    NECK: Trachea midline, no stridor.    RESP: No accessory muscle use.  Symmetrically distant breath sounds to a moderate degree.  Lungs clear throughout on inspiration.  Expiration mildly to moderately prolonged, no wheeze.  CV: Regular rate and rhythm, non-tachycardic.  Normal S1 S2, no murmur or extra sound.  No lower extremity edema.  ABDOMEN: Soft, non-tender, no guarding.  Liver and spleen not palpable, no masses palpable.  Bowel sounds positive.  MS: There are curvilinear, healed incisions on both the medial and lateral malleolus of the right ankle.  The right ankle is mildly swollen as compared to the left, mildly warm, but not erythematous.  I cannot palpate any fluctuance in the joint or in the foot  dorsum.  SKIN: Warm and dry, no rashes.  NEURO: Alert, oriented, conversant.  Cranial nerves III - XII grossly intact.  No gross motor or sensory deficits.    PSYCH: Calm, alert, conversant.  Able to articulate logical thoughts, no tangential thoughts, no hallucinations or delusions.  Affect normal.        Data   Recent Labs   Lab 07/27/19  0538 07/26/19  1632   WBC 8.2 8.9   HGB 14.0 15.1   MCV 88 90    270    140   POTASSIUM 3.7 3.2*   CHLORIDE 111* 108   CO2 28 24   BUN 9 11   CR 0.58 0.66   ANIONGAP 4 8   ANNE MARIE 8.4* 9.2   GLC 99 93   ALBUMIN 3.3* 4.0   PROTTOTAL 6.6* 8.2   BILITOTAL 0.3 0.4   ALKPHOS 100 118   ALT 16 19   AST 11 13       Recent Labs   Lab 07/27/19  0538 07/26/19  1632   GLC 99 93        Unresulted Labs Ordered in the Past 30 Days of this Admission     Date and Time Order Name Status Description    7/26/2019 2125 Blood culture Preliminary     7/26/2019 2125 Blood culture Preliminary            Imaging  No results found for this or any previous visit (from the past 24 hour(s)).     I reviewed all new labs and imaging results over the last 24 hours. I personally reviewed no images or EKG's today.    Medications       amLODIPine  10 mg Oral QAM     cefTRIAXone  1 g Intravenous Q24H     fish oil-omega-3 fatty acids  1 g Oral QPM     glucosamine  500 mg Oral BID     imipramine  150 mg Oral At Bedtime     levothyroxine  112 mcg Oral QAM AC     oxyCODONE  10 mg Oral At Bedtime     potassium chloride ER  30 mEq Oral Daily     pravastatin  40 mg Oral At Bedtime     pregabalin  100 mg Oral TID     tiZANidine  4 mg Oral At Bedtime     vancomycin (VANCOCIN) IV  1,750 mg Intravenous Q12H     vitamin D3  2,000 Units Oral QPM       Sean Combs MD

## 2019-07-28 NOTE — PHARMACY-VANCOMYCIN DOSING SERVICE
Pharmacy Vancomycin Note  Date of Service 2019  Patient's  1960   59 year old, female    Indication: Osteomyelitis  Goal Trough Level: 15-20 mg/L  Day of Therapy: 2  Current Vancomycin regimen:  1500 mg IV q12h    Current estimated CrCl = Estimated Creatinine Clearance: 108.5 mL/min (based on SCr of 0.58 mg/dL).    Creatinine for last 3 days  2019:  4:32 PM Creatinine 0.66 mg/dL  2019:  5:38 AM Creatinine 0.58 mg/dL    Recent Vancomycin Levels (past 3 days)  2019:  9:44 AM Vancomycin Level 13.1 mg/L    Vancomycin IV Administrations (past 72 hours)                   vancomycin (VANCOCIN) 1,500 mg in sodium chloride 0.9 % 250 mL intermittent infusion (mg) 1,500 mg New Bag 19 2312     1,500 mg New Bag  1017     1,500 mg New Bag 19 2151                Nephrotoxins and other renal medications (From now, onward)    Start     Dose/Rate Route Frequency Ordered Stop    19 1100  vancomycin (VANCOCIN) 1,750 mg in sodium chloride 0.9 % 500 mL intermittent infusion      1,750 mg  over 2 Hours Intravenous EVERY 12 HOURS 19 1050               Contrast Orders - past 72 hours (72h ago, onward)    Start     Dose/Rate Route Frequency Ordered Stop    19 194  gadobutrol (GADAVIST) injection 8 mL      8 mL Intravenous ONCE 19          Interpretation of levels and current regimen:  Trough level is  Subtherapeutic    Has serum creatinine changed > 50% in last 72 hours: unknown but has normal renal function    Urine output:  unable to determine    Renal Function: assume stable    Plan:  1.  Increase Dose to 1750mg q12h  2.  Pharmacy will check trough levels as appropriate in 1-3 Days.    3. Serum creatinine levels will be ordered daily for the first week of therapy and at least twice weekly for subsequent weeks.      Rubina Davis        .

## 2019-07-29 ENCOUNTER — HOME INFUSION (PRE-WILLOW HOME INFUSION) (OUTPATIENT)
Dept: PHARMACY | Facility: CLINIC | Age: 59
End: 2019-07-29

## 2019-07-29 VITALS
HEART RATE: 82 BPM | TEMPERATURE: 98.1 F | WEIGHT: 192.46 LBS | SYSTOLIC BLOOD PRESSURE: 129 MMHG | DIASTOLIC BLOOD PRESSURE: 55 MMHG | RESPIRATION RATE: 18 BRPM | BODY MASS INDEX: 35.42 KG/M2 | OXYGEN SATURATION: 94 % | HEIGHT: 62 IN

## 2019-07-29 PROBLEM — G89.4 CHRONIC PAIN SYNDROME: Chronic | Status: ACTIVE | Noted: 2019-07-29

## 2019-07-29 LAB
ANION GAP SERPL CALCULATED.3IONS-SCNC: 3 MMOL/L (ref 3–14)
BUN SERPL-MCNC: 8 MG/DL (ref 7–30)
CALCIUM SERPL-MCNC: 7.9 MG/DL (ref 8.5–10.1)
CHLORIDE SERPL-SCNC: 111 MMOL/L (ref 94–109)
CO2 SERPL-SCNC: 29 MMOL/L (ref 20–32)
CREAT SERPL-MCNC: 0.62 MG/DL (ref 0.52–1.04)
ERYTHROCYTE [DISTWIDTH] IN BLOOD BY AUTOMATED COUNT: 13.3 % (ref 10–15)
GFR SERPL CREATININE-BSD FRML MDRD: >90 ML/MIN/{1.73_M2}
GLUCOSE SERPL-MCNC: 128 MG/DL (ref 70–99)
HCT VFR BLD AUTO: 38.9 % (ref 35–47)
HGB BLD-MCNC: 12.4 G/DL (ref 11.7–15.7)
MCH RBC QN AUTO: 28.8 PG (ref 26.5–33)
MCHC RBC AUTO-ENTMCNC: 31.9 G/DL (ref 31.5–36.5)
MCV RBC AUTO: 90 FL (ref 78–100)
PLATELET # BLD AUTO: 219 10E9/L (ref 150–450)
POTASSIUM SERPL-SCNC: 3.6 MMOL/L (ref 3.4–5.3)
RBC # BLD AUTO: 4.31 10E12/L (ref 3.8–5.2)
SODIUM SERPL-SCNC: 143 MMOL/L (ref 133–144)
WBC # BLD AUTO: 6.2 10E9/L (ref 4–11)

## 2019-07-29 PROCEDURE — 99239 HOSP IP/OBS DSCHRG MGMT >30: CPT

## 2019-07-29 PROCEDURE — 25000128 H RX IP 250 OP 636

## 2019-07-29 PROCEDURE — 25000132 ZZH RX MED GY IP 250 OP 250 PS 637: Performed by: FAMILY MEDICINE

## 2019-07-29 PROCEDURE — 85027 COMPLETE CBC AUTOMATED: CPT

## 2019-07-29 PROCEDURE — 25800030 ZZH RX IP 258 OP 636

## 2019-07-29 PROCEDURE — 80048 BASIC METABOLIC PNL TOTAL CA: CPT

## 2019-07-29 PROCEDURE — 36415 COLL VENOUS BLD VENIPUNCTURE: CPT

## 2019-07-29 RX ORDER — OXYCODONE AND ACETAMINOPHEN 5; 325 MG/1; MG/1
1-2 TABLET ORAL EVERY 6 HOURS PRN
Qty: 30 TABLET | Refills: 0 | Status: SHIPPED | OUTPATIENT
Start: 2019-07-29

## 2019-07-29 RX ORDER — POTASSIUM CHLORIDE 750 MG/1
30 TABLET, EXTENDED RELEASE ORAL DAILY
Qty: 90 TABLET | Refills: 0 | Status: SHIPPED | OUTPATIENT
Start: 2019-07-30

## 2019-07-29 RX ADMIN — LEVOTHYROXINE SODIUM 112 MCG: 0.11 TABLET ORAL at 06:33

## 2019-07-29 RX ADMIN — PREGABALIN 100 MG: 100 CAPSULE ORAL at 08:10

## 2019-07-29 RX ADMIN — OXYCODONE HYDROCHLORIDE 10 MG: 5 TABLET ORAL at 08:10

## 2019-07-29 RX ADMIN — Medication 500 MG: at 08:13

## 2019-07-29 RX ADMIN — OXYCODONE HYDROCHLORIDE 10 MG: 5 TABLET ORAL at 11:21

## 2019-07-29 RX ADMIN — VANCOMYCIN HYDROCHLORIDE 1750 MG: 1 INJECTION, POWDER, LYOPHILIZED, FOR SOLUTION INTRAVENOUS at 11:47

## 2019-07-29 RX ADMIN — AMLODIPINE BESYLATE 10 MG: 10 TABLET ORAL at 08:10

## 2019-07-29 RX ADMIN — POTASSIUM CHLORIDE 30 MEQ: 750 TABLET, FILM COATED, EXTENDED RELEASE ORAL at 08:10

## 2019-07-29 ASSESSMENT — ACTIVITIES OF DAILY LIVING (ADL)
ADLS_ACUITY_SCORE: 12

## 2019-07-29 NOTE — PROGRESS NOTES
Therapy: IV ABX  Insurance: [employer]I  Ded: $2000  Met: $2000    Co-Insurance:20%  Max Out of Pocket: $3500  Met: $3500    Please contact Intake with any questions, [phone] or In Basket pool, [facility] ([phone]).  [facility]-In reference to admission on 07/26/2019 to checkon IV ABX coverage

## 2019-07-29 NOTE — PLAN OF CARE
[facility] [facility] DISCHARGE NOTE    Patient discharged to home at 2:34 PM via ambulation. Accompanied by mother and staff. Discharge instructions reviewed with patient, opportunity offered to ask questions. Prescriptions sent to patients preferred pharmacy. All belongings sent with patient.    Celi Kemp

## 2019-07-29 NOTE — CONSULTS
[facility] Consultation    Consultation - [facility]  Level of consult: Consult, follow and place orders    Britni Tavarez,  1960, MRN [id number]     Admitting Dx: Osteomyelitis of right ankle, unspecified type (H) [M86.9]     PCP: Sanchez, [facility], [phone]     Code status:  No Order     Extended Emergency Contact Information  Primary Emergency Contact: Earnestine Tavarez   [facility]  Home Phone: [phone]  Relation: Daughter  Secondary Emergency Contact: KIM NINA  Home Phone: [phone]  Mobile Phone: [phone]  Relation: Relative     Assessment & Plan:  1.  Posttraumatic right lower extremity  2.  Post right ankle arthrodesis, performed circa   3.  Post recent right ankle deep hardware removal, bone culture and antibacterial treatment  4.  Recurrent infection right lower extremity with associated cellulitis  5.  Right ankle osteomyelitis given MRI findings, chronicity, recurrence and chronology        I reviewed the condition, lab findings, most recent revealing MRI implications with the patient.  I do believe this to be explained by an indwelling osteomyelitis right ankle that was likely compressed with the oral antibiotics she came off she had more swelling, aching and flare of her right extremity with MRI confirming the suspicion.  Do not recommend surgical care at this time.  Recommend infectious disease consultation and/or input on the treatment here in regard to history, lack of identified organism directed treatment.  Would appreciate their input in regard to antibiotic, delivery route and duration setting.  Additional bone cultures can be obtained.  However, patient has remained on antibiotics prior cultures have been without active growth.  Reviewed timeframes implications with the patient  Is likely being at this point IV, parenteral care for 6 to 8 weeks to more definitively treat the infection versus ongoing oral suppression  therapy.  This will be further explored pending infectious disease input.    Activity: Continue weightbearing as tolerated right lower extremity with assistive devices as needed.  Physical therapy can evaluate and treat.  Pain management: Patient has been continued on outpatient higher dose p.o. narcotics with additional IV needs to be covered.  Position: Recommend continued elevation when supine.  Recommend smoking cessation again with the patient she was counseled on the limitation of bone healing with continued tobacco usage.  Case care reviewed today with patient, family member, bedside nurse as well as physician Dr. Combs.    I will continue to follow the patient's progress peripherally.  Please contact me with questions or concerns and coordinate care.  Eventually, patient can also follow-up outpatient once more definitive plan has been established as well.      Principal Problem:    Osteomyelitis (H)  Active Problems:    Benign essential hypertension    Fibromyalgia    Depression    Hypothyroidism (acquired)    Migraine headache    Hypokalemia       Chief Complaint  Right Ankle Pain - Recurrent Infection Concern     HPI  We have been requested by Hospital Physician to evaluate Britni Tavarez who is a 59 year old year old female for a recurrent right ankle infection concern.  Patient is known to me from prior clinical and surgical care.  At recent clinical appointment approximately 2 weeks ago, patient had been doing reasonably well.  Prior to presenting to the emergency department day before last, she had been having increasing pain, redness and swelling.  This progressed after being off of oral antibiotics for 5 days making infection relevant.  Patient does not report any other preceding events other than day-to-day activities.  She did get to the point where she felt like she was able to do some more prior to this most recent flare.  She is very concerned about her ability, mobility and ability to  work in the near future with short-term relief running out in approximately 4 weeks.  She would like this infection dealt with as quickly and safely as possible.  Historically, patient did have a posttraumatic right lower extremity with ankle arthrodesis performed in the mid 90s.  More recently, the fiber from the surgery was removed due to infection concern nipple cultures were obtained.  Gram stain was positive for gram-negative tisha but no cultures grew out any other times significantly guide therapy.  Lately, patient has had no active drainage from the site externally.  She has been able to be ambulatory.  Additionally, she does continue to smoke on a regular basis against recommendations.  She states that she is feeling better since admission after several doses of IV antibiotics.  Patient is also on a chronic pain agreement and takes large oral doses of narcotics.  No additional concerns present at this time upon review with patient and daughter present in the room.     History is obtained from the patient     Past Medical History  Past Medical History:   Diagnosis Date     Back pain      Cellulitis      Chronic pain      Depressive disorder      Elevated cholesterol      Fibromyalgia      Hypertension      Hypothyroid      Migraines      Osteopenia      Recurrent sinus infections      Spinal enthesopathy (H)        Surgical History  Past Surgical History:   Procedure Laterality Date     HYSTERECTOMY       REMOVE HARDWARE ANKLE Right 6/13/2019    Procedure: RIGHT ANKLE: DEEP HARDWARE REMOVAL; SUBTALAR AND MIDTARSAL JOINT DEBRIDEMENTS;  Surgeon: Nikita Escobedo DPM;  Location: [facility] OR     TYMPANOPLASTY          Social History  Social History     Socioeconomic History     Marital status:      Spouse name: Not on file     Number of children: Not on file     Years of education: Not on file     Highest education level: Not on file   Occupational History     Not on file   Social Needs     Financial resource  strain: Not on file     Food insecurity:     Worry: Not on file     Inability: Not on file     Transportation needs:     Medical: Not on file     Non-medical: Not on file   Tobacco Use     Smoking status: Current Every Day Smoker     Packs/day: 1.00     Smokeless tobacco: Never Used   Substance and Sexual Activity     Alcohol use: Not Currently     Frequency: Never     Drug use: Never     Sexual activity: Not Currently     Partners: Male   Lifestyle     Physical activity:     Days per week: Not on file     Minutes per session: Not on file     Stress: Not on file   Relationships     Social connections:     Talks on phone: Not on file     Gets together: Not on file     Attends Mandaeism service: Not on file     Active member of club or organization: Not on file     Attends meetings of clubs or organizations: Not on file     Relationship status: Not on file     Intimate partner violence:     Fear of current or ex partner: Not on file     Emotionally abused: Not on file     Physically abused: Not on file     Forced sexual activity: Not on file   Other Topics Concern     Parent/sibling w/ CABG, MI or angioplasty before 65F 55M? Not Asked   Social History Narrative     Not on file       Family History  Family History   Problem Relation Age of Onset     Cancer Father         Lung         Allergies:  Atorvastatin; Augmentin; Cortisone; Flagyl [metronidazole]; Ibuprofen; Lasix [furosemide]; Morphine; Propranolol; Simvastatin; Sulfa drugs; Valtrex [valacyclovir]; Penicillins; and Prednisone      Current Medications:  Current Facility-Administered Medications   Medication     acetaminophen (TYLENOL) tablet 650 mg     amLODIPine (NORVASC) tablet 10 mg     cefTRIAXone in d5w (ROCEPHIN) intermittent infusion 1 g     diclofenac (VOLTAREN) 1 % topical gel 2 g     fish oil-omega-3 fatty acids capsule 1 g     glucosamine capsule 500 mg     HYDROmorphone (PF) (DILAUDID) injection 0.3-0.5 mg     imipramine (TOFRANIL) tablet 150 mg      levothyroxine (SYNTHROID/LEVOTHROID) tablet 112 mcg     naloxone (NARCAN) injection 0.1-0.4 mg     oxyCODONE (oxyCONTIN) 12 hr tablet 10 mg     oxyCODONE (ROXICODONE) tablet 5-10 mg     potassium chloride (KLOR-CON) Packet 20-40 mEq     potassium chloride 10 mEq in 100 mL intermittent infusion with 10 mg lidocaine     potassium chloride 10 mEq in 100 mL sterile water intermittent infusion (premix)     potassium chloride ER (K-DUR/KLOR-CON M) CR tablet 20-40 mEq     potassium chloride ER (K-TAB/KLOR-CON) CR tablet 30 mEq     pravastatin (PRAVACHOL) tablet 40 mg     pregabalin (LYRICA) capsule 100 mg     tiZANidine (ZANAFLEX) tablet 4 mg     vancomycin (VANCOCIN) 1,750 mg in sodium chloride 0.9 % 500 mL intermittent infusion     vitamin D3 (CHOLECALCIFEROL) 1000 units (25 mcg) tablet 2,000 Units       Review of Systems:  A comprehensive review of systems was performed and found to be negative except as described in this note    Physical Exam:  Temp:  [97.9  F (36.6  C)-98.4  F (36.9  C)] 97.9  F (36.6  C)  Pulse:  [71-78] 71  Heart Rate:  [62-78] 72  Resp:  [16-18] 18  BP: (113-134)/(52-66) 134/54  SpO2:  [91 %-94 %] 91 %    General: Patient alert, oriented, interactive, into the situation with her daughter present  Neurological: Increased sensation laterally and anteriorly about the ankle at the site of prior ankle hardware removal.  Light touch sensation otherwise intact.  Vascular: Capillary filling time is within normal limits.  Pulses are somewhat obscured by the 1-2+ edema and areas about the ankle.  Dermatology: Margins remain healed aside from well adhered eschar of lateral incision.  There is no active drainage.  No evidence of underlying abscess or fluctuance and presence of more chronically indurated tissues.  MSK: No appreciable motion about the ankle.  Pain appreciated about the subtalar joint and talonavicular joint with attempted range of motion.  Palpatory pain primarily about the ankle arthrodesis  site and site of prior deep hardware removal from this preformed arthrodesis procedure.  Gait analysis: Observed patient going for possible break.  Slight antalgic gait as relates to the right lower extremity and ankle otherwise patient to ambulate at a reasonable pace with some guarding of the right lower extremity.      Pertinent Labs  Lab Results: personally reviewed.  Lab Results   Component Value Date    WBC 8.2 07/27/2019    HGB 14.0 07/27/2019    HCT 43.6 07/27/2019    MCV 88 07/27/2019     07/27/2019     No results for input(s): INR in the last 168 hours.    Pertinent Radiology  Radiology Results: images and radiology report reviewed  MR ANKLE RIGHT WITHOUT AND WITH CONTRAST    7/26/2019 8:04 PM      HISTORY: Chronic right ankle pain. Explantation of hardware June 13.  Concern for osteomyelitis.     COMPARISON: Radiographs from 7/4/2019.     TECHNIQUE: Sagittal T1 and T2 axial T1 fat saturated pre and  postgadolinium, axial STIR, and coronal T1 fat-saturated pre and  postgadolinium.     FINDINGS:     Bones and joints: Postoperative changes from partial distal fibular  shaft resection and tibiotalar arthrodesis. The arthrodesis screws  have been removed. No hardware remains. There is solid bony union at  the arthrodesis. However, there is moderate to marked marrow edema and  enhancement in the distal tibia and throughout the talus, particularly  the talar dome and talar head.     Moderate degenerative changes in the posterior subtalar joint.  Mild-to-moderate degenerative changes in the middle facet of the  subtalar joint. Mild degenerative changes at the talonavicular joint  and calcaneocuboid joint. There is an effusion in the posterior  subtalar joint and there are intra-articular bodies. It is unclear  whether or not there is synovitis. There is also an effusion in the  talonavicular joint though it is unclear whether there is synovitis  here. (It is difficult to assess for synovitis because of  the  susceptibility artifact from micrometallic debris about the prior  surgical region).     Muscles and tendons: There is extensive tendinosis and discontinuity  in the distal aspect of the tibialis anterior tendon at the level of  the distal tibia. There is extensive tendinosis and partial tear in  the posterior tibial tendon in the region of prior screw tract is seen  on image 13 of the axial T1 fat-saturated postgadolinium images  located 2-3 cm cephalad to the tibiotalar arthrodesis site. There is  no evidence of tenosynovitis. Mild to moderate diffuse muscle atrophy.  Mild diffuse muscle edema.     Other soft tissues: Extensive superficial soft tissue edema diffusely.  No focal fluid collection is evident in the soft tissues. No hematoma,  cyst or mass. No evidence of devitalized tissue.                                                                      IMPRESSION:  1. There is a solid tibiotalar arthrodesis. There are tracts from  prior hardware removal. There is moderate to marked marrow edema  throughout the distal tibia and in the talus, particularly the talar  dome and talar head. These findings are concerning for osteomyelitis.  2. There is an effusion and there are intra-articular bodies in the  posterior subtalar joint and there is an effusion in the talonavicular  joint. It is unclear whether or not there is synovitis in these joints  (limited evaluation because of the micrometallic susceptibility  artifact from prior surgery/instrumentation).  3. There is mild muscle edema diffusely, so myositis cannot be ruled  out. There is no evidence of abscess or tenosynovitis.  4. Tendinosis and partial tear of the posterior tibial tendon proximal  to the arthrodesis in the region of prior screw tract.  5. Tendinosis and discontinuity of the distal aspect of the tibialis  anterior tendon at the level of the distal tibia.  6. Mild to moderate diffuse muscle atrophy.  7. There is extensive superficial soft  tissue edema diffusely.     SINAN BENOIT MD      US LOWER EXTREMITY VENOUS DUPLEX RIGHT   7/26/2019 9:16 PM      HISTORY: Right lower extremity edema.     COMPARISON: None.     TECHNIQUE: Spectral doppler and waveform analysis were performed on  the right lower extremity veins.     FINDINGS: The right common femoral, femoral, and popliteal veins are  patent, compressible, and negative for deep venous thrombosis. Calf  veins are segmentally seen but appear negative for DVT where  visualized.                                                                      IMPRESSION:  No evidence for deep venous thrombosis in the right lower  extremity veins.     MD Nikita LEWIS DPM, FACFAS  Foot & Ankle Surgeon/Specialist  [facility]

## 2019-07-29 NOTE — DISCHARGE SUMMARY
[facility]    Discharge Summary  Hospital Medicine    Date of Admission:  7/26/2019  Date of Discharge:  7/29/2019   Discharging Provider: Sean Combs  Date of Service: 7/29/2019      Primary Care     Clinic, [facility]  [address] [facility] [address]      Identification and Chief Compaint:  Britni Tavarez is a 59 year old female who presented on 7/26/2019 with left ankle pain.    Discharge Diagnoses       Subacute osteomyelitis (H) right ankle associated with previous ankle hardware    Benign essential hypertension    Fibromyalgia    Depression    Hypothyroidism (acquired)    Migraine headache    Hypokalemia    Chronic pain syndrome      Discharge Disposition   Discharged to home    Discharge Orders       Reason for your hospital stay    You are admitted to evaluate and treat increasing pain from your right ankle, despite hardware removal from that ankle about a month ago.  The MRI scan done of that ankle on admission shows osteomyelitis, a bone infection.  You were seen in consultation by Dr. Escobedo, your podiatry physician.  You, Dr. Escobedo, and I decided we should involve infectious disease, who I contacted today by telephone.  You and I reviewed the recommendations from that discussion, and I also discussed the infectious disease recommendations with Dr. Escobedo today by phone.     We will let you go home today, and we will send you home off of antibiotics.  Allowing you to have an antibiotic free interval before tissue and bone specimens are obtained we will increase the likelihood that one or more of those cultures will turn up positive.  Dr. Escobedo has called his office, and is trying to arrange OR time for Monday, 8/5/2019, at which time he will do an operation to obtain some tissue and some bone from your right ankle to send for culture.  Dr. Escobedo tells me that his office will contact you directly to set up these details.  However, if you do not hear  from his office in the next day or 2, by all means give them a call.     I understand that you will need additional oxycodone beyond your Percocet dose of 1 tablet every 8 hours which you were taking prior to this admission.  I would suggest that you increase this to 2 tablets at a time if you need to, and use it up to every 6 hours as needed.  This will still leave you within a safe range for acetaminophen dosing.  To account for this increased dosing, I have written you a prescription for an additional 30 tablets which you can take to the pharmacy to fill if you come up short.  You will also continue your OxyContin and pregabalin as you have been taking prior to admission.     It was a pleasure meeting you.  Good luck with your operation on Monday.          Your medications have changed     START taking:    potassium chloride ER (K-TAB/KLOR-CON)    Start taking on: 7/30/2019   This replaces a similar medication. See the full medication list for instructions.   CHANGE how you take:    oxyCODONE-acetaminophen (PERCOCET)    STOP taking:    aspirin 325 MG EC tablet (ASA)     potassium chloride ER 20 MEQ CR tablet (K-DUR/KLOR-CON M)    Replaced by a similar medication.   Review your updated medication list below.   Your Next Steps     Do         these medications from [facility] [address] [place] [address]     potassium chloride ER       these medications from any pharmacy with your printed prescription     oxyCODONE-acetaminophen      Activity instructions     Your activity upon discharge: activity as tolerated.      Diet instructions     Follow this diet upon discharge: Orders Placed This Encounter      Regular Diet Adult   Follow-up Appointments     Follow-up Appointments   Follow-up and recommended labs and tests     Follow up with Dr. Escobedo at your operation which in the process of being scheduled, tentatively for Monday, 8/5/19.                Discharge Medications   Current  Discharge Medication List      START taking these medications    Details   potassium chloride ER (K-TAB/KLOR-CON) 10 MEQ CR tablet Take 3 tablets (30 mEq) by mouth daily  Qty: 90 tablet, Refills: 0    Associated Diagnoses: Hypokalemia         CONTINUE these medications which have CHANGED    Details   oxyCODONE-acetaminophen (PERCOCET) 5-325 MG tablet Take 1-2 tablets by mouth every 6 hours as needed for moderate to severe pain  Qty: 30 tablet, Refills: 0    Associated Diagnoses: Acute hematogenous osteomyelitis of right ankle (H)         CONTINUE these medications which have NOT CHANGED    Details   amLODIPine (NORVASC) 10 MG tablet Take 1 tablet (10 mg) by mouth daily  Qty: 90 tablet, Refills: 1    Associated Diagnoses: Benign essential hypertension      COCONUT OIL PO Take 500 mg by mouth 2 times daily      CRANBERRY PO Take 1 capsule by mouth 3 times daily       diclofenac (VOLTAREN) 1 % topical gel Place onto the skin every 4 hours As needed      fish oil-omega-3 fatty acids 1000 MG capsule Take 1 g by mouth every evening       glucosamine-chondroitin 500-400 MG CAPS per capsule Take 1 capsule by mouth 2 times daily       imipramine (TOFRANIL) 50 MG tablet Take 3 tablets (150 mg) by mouth At Bedtime  Qty: 90 tablet, Refills: 3    Associated Diagnoses: Migraine without status migrainosus, not intractable, unspecified migraine type      levothyroxine (SYNTHROID/LEVOTHROID) 112 MCG tablet Take 1 tablet (112 mcg) by mouth every evening  Qty: 90 tablet, Refills: 1    Associated Diagnoses: Hypothyroidism (acquired)      Multiple Vitamins-Minerals (MULTIVITAMIN ADULT PO) Take 1 tablet by mouth daily       oxyCODONE (OXYCONTIN) 10 MG 12 hr tablet Take 10 mg by mouth At Bedtime       pravastatin (PRAVACHOL) 40 MG tablet Take 1 tablet (40 mg) by mouth daily  Qty: 90 tablet, Refills: 1    Associated Diagnoses: Benign essential hypertension      pregabalin (LYRICA) 100 MG capsule Take 100 mg by mouth 3 times daily 0700, 1  pm, 7 pm      tiZANidine (ZANAFLEX) 4 MG tablet Take 4 mg by mouth At Bedtime May repeat once if unable to sleep      Turmeric, Curcuma Longa, (TURMERIC ROOT) POWD Take 1 capsule by mouth every evening       VITAMIN D, CHOLECALCIFEROL, PO Take 2,000 Units by mouth every evening          STOP taking these medications       aspirin (ASA) 325 MG EC tablet Comments:   Reason for Stopping:         potassium chloride ER (K-DUR/KLOR-CON M) 20 MEQ CR tablet Comments:   Reason for Stopping:             Allergies   Allergies   Allergen Reactions     Atorvastatin GI Disturbance     Augmentin Hives     Cortisone Other (See Comments)     IM only     Flagyl [Metronidazole] Nausea     Ibuprofen Other (See Comments)     Increased bleeding     Lasix [Furosemide] Other (See Comments)     Morphine Hives     Propranolol Other (See Comments)     Low blood pressure and fainting          Simvastatin Muscle Pain (Myalgia)     Sulfa Drugs Hives     Valtrex [Valacyclovir] Hives     Penicillins Rash     Prednisone Rash       Consultations This Hospital Stay   Consultation during this admission received from:    ORTHOPEDIC SURGERY IP CONSULT  PHARMACY TO DOSE [facility] TRANSITION RN/SW IP CONSULT    Significant Results and Procedures   Procedures    None.    Data   Results for orders placed or performed during the hospital encounter of 07/26/19   US Lower Extremity Venous Duplex Right    Narrative    US LOWER EXTREMITY VENOUS DUPLEX RIGHT   7/26/2019 9:16 PM     HISTORY: Right lower extremity edema.    COMPARISON: None.    TECHNIQUE: Spectral doppler and waveform analysis were performed on  the right lower extremity veins.    FINDINGS: The right common femoral, femoral, and popliteal veins are  patent, compressible, and negative for deep venous thrombosis. Calf  veins are segmentally seen but appear negative for DVT where  visualized.      Impression    IMPRESSION:  No evidence for deep venous thrombosis in the right lower  extremity  veins.    CHHAYA BARAHONA MD   MR Ankle Right w/o & w Contrast    Narrative    MR ANKLE RIGHT WITHOUT AND WITH CONTRAST    7/26/2019 8:04 PM     HISTORY: Chronic right ankle pain. Explantation of hardware June 13.  Concern for osteomyelitis.    COMPARISON: Radiographs from 7/4/2019.    TECHNIQUE: Sagittal T1 and T2 axial T1 fat saturated pre and  postgadolinium, axial STIR, and coronal T1 fat-saturated pre and  postgadolinium.    FINDINGS:    Bones and joints: Postoperative changes from partial distal fibular  shaft resection and tibiotalar arthrodesis. The arthrodesis screws  have been removed. No hardware remains. There is solid bony union at  the arthrodesis. However, there is moderate to marked marrow edema and  enhancement in the distal tibia and throughout the talus, particularly  the talar dome and talar head.    Moderate degenerative changes in the posterior subtalar joint.  Mild-to-moderate degenerative changes in the middle facet of the  subtalar joint. Mild degenerative changes at the talonavicular joint  and calcaneocuboid joint. There is an effusion in the posterior  subtalar joint and there are intra-articular bodies. It is unclear  whether or not there is synovitis. There is also an effusion in the  talonavicular joint though it is unclear whether there is synovitis  here. (It is difficult to assess for synovitis because of the  susceptibility artifact from micrometallic debris about the prior  surgical region).    Muscles and tendons: There is extensive tendinosis and discontinuity  in the distal aspect of the tibialis anterior tendon at the level of  the distal tibia. There is extensive tendinosis and partial tear in  the posterior tibial tendon in the region of prior screw tract is seen  on image 13 of the axial T1 fat-saturated postgadolinium images  located 2-3 cm cephalad to the tibiotalar arthrodesis site. There is  no evidence of tenosynovitis. Mild to moderate diffuse muscle atrophy.  Mild  diffuse muscle edema.    Other soft tissues: Extensive superficial soft tissue edema diffusely.  No focal fluid collection is evident in the soft tissues. No hematoma,  cyst or mass. No evidence of devitalized tissue.      Impression    IMPRESSION:  1. There is a solid tibiotalar arthrodesis. There are tracts from  prior hardware removal. There is moderate to marked marrow edema  throughout the distal tibia and in the talus, particularly the talar  dome and talar head. These findings are concerning for osteomyelitis.  2. There is an effusion and there are intra-articular bodies in the  posterior subtalar joint and there is an effusion in the talonavicular  joint. It is unclear whether or not there is synovitis in these joints  (limited evaluation because of the micrometallic susceptibility  artifact from prior surgery/instrumentation).  3. There is mild muscle edema diffusely, so myositis cannot be ruled  out. There is no evidence of abscess or tenosynovitis.  4. Tendinosis and partial tear of the posterior tibial tendon proximal  to the arthrodesis in the region of prior screw tract.  5. Tendinosis and discontinuity of the distal aspect of the tibialis  anterior tendon at the level of the distal tibia.  6. Mild to moderate diffuse muscle atrophy.  7. There is extensive superficial soft tissue edema diffusely.    SINAN BENOIT MD       History of Present Illness   (From H&P) This patient is a 59 year old  female with a significant past medical history of fibromyalgia, depression, hypertension and hypothyroidism who presents with increasing ankle pain and concern for infection.  She has a remote history of ankle fusion many years ago and then on 6/13/19 she had explanation of the 4 screws still left due to chronic pain and concern of possible infected hardware.  She had wound dehiscence for which she was seen in clinic on 7/3/19 and was started on levaquin at that time.  She was admitted for hypokalemia  the following day - had low suspicion for infection, but levaquin was continued - completed 6 days of this.   Has now been having increasing pain past few days although says the pain's always been there since the surgery - has also had increasing trouble ambulating due to the pain.  Has swelling in the ankle, particularly when it hasn't been elevated for a while.  Says swelling currently pretty good since her leg's been up mostly today.  No fever or chills.  No other new concerns or symptoms.      Smokes 3/4 ppd, no alcohol or illicit drug use.      Hospital Course   Britni Tavarez was admitted on 7/26/2019.  The following problems were addressed during her hospitalization:    Subacute osteomyelitis (H) right ankle associated with previous ankle hardware  History of remote surgery/hardware placement, with hardware removal 6/13/19 due to chronic pain and and concern for chronic infection.  Has had ongoing trouble with pain, swelling, and difficulty walking since that time, particularly worse over thefew days prior to this admission.  MRI 7/26/19 showed multiple abnormalities:     IMPRESSION:  1. There is a solid tibiotalar arthrodesis. There are tracts from  prior hardware removal. There is moderate to marked marrow edema  throughout the distal tibia and in the talus, particularly the talar  dome and talar head. These findings are concerning for osteomyelitis.  2. There is an effusion and there are intra-articular bodies in the  posterior subtalar joint and there is an effusion in the talonavicular  joint. It is unclear whether or not there is synovitis in these joints  (limited evaluation because of the micrometallic susceptibility  artifact from prior surgery/instrumentation).  3. There is mild muscle edema diffusely, so myositis cannot be ruled  out. There is no evidence of abscess or tenosynovitis.  4. Tendinosis and partial tear of the posterior tibial tendon proximal  to the arthrodesis in the region of  "prior screw tract.  5. Tendinosis and discontinuity of the distal aspect of the tibialis  anterior tendon at the level of the distal tibia.  6. Mild to moderate diffuse muscle atrophy.  7. There is extensive superficial soft tissue edema diffusely.     These changes suggest osteomyelitis.  Blood cultures 7/26/19 remain negative to date.  Started empirically on CTAX and vanco, currently day #4.  She has been afebrile, feeling well aside from ankle pain.      Dr. Escobedo, Podiatry, was consulted.  He and I discussed her care on multiple occasions, most recently today.  Dr. Escobedo is confident that the patient does indeed have osteomyelitis.  How to treat it is less clear.  Specifically, we have no culture data to guide antibiotic therapy.  The surgical specimens from 6/13/2019 have shown no growth, either in the aerobic or anaerobic bottles.  Cultures obtained from the \"right ankle wound\" from 7/8/2019 are no growth.  Blood cultures from 7/26/2019 are no growth to date.  Therefore, although antibiotic therapy is certainly necessary for the treatment of this osteomyelitis, we do not know whether oral versus IV therapy is necessary, which drug or drugs are needed, and how long IV therapy should be continued.    I obtained a phone consultation today from infectious disease at [facility].  I discussed the patient's case with that ID physician, and post my questions above.   Summary of those recommendations is as follows:    1.  Tissue and bone biopsies are favored to target therapy.  2.  If tissue and bone cultures cannot be obtained, either because the surgeon advises the biopsy should not be performed, or the patient refuses, empiric therapy, oral, for 8 weeks is recommended with a combination of levofloxacin or ciprofloxacin, doxycycline, and metronidazole.  The ID physician advised that the risk of GI side effects is significant with this combination, and that some patients are intolerant.  3.  Should the " patient have GI intolerance to that regimen, a PICC line could be placed, to deliver Zosyn as a continuous IV infusion for 6 to 8 weeks, along with doxycycline orally.  4.  If tissue and bone cultures are going to be obtained, an antibiotic free period of at least 72 hours, and preferably 96 hours, is recommended, to increase the likelihood of yield.    I discussed these recommendations by phone today with Dr. Escobedo.  He agrees that obtaining tissue and bone biopsies for culture is warranted.  He thinks that he can get this scheduled as an outpatient procedure on Monday, 8/5/2019.  He will ask his office staff to coordinate scheduling the procedure with the patient.    I also discussed these recommendations with the patient today.  She expresses agreement with the plan both for outpatient surgery to obtain tissue and bone culture, as well as for the antibiotic free interval prior to that procedure.    -The patient feels well for discharge today.  We will send her home on no antibiotics.  -Current analgesia oxycodone 5-10 mg every 4 hours PRN (which is an increase from home regimen of oxycodone/acetaminophen 5/325 mg 3 times daily); she is receiving 10 mg doses, but is not needing it consistently at every 3 hours, receiving it more along the lines of every 6 hours as needed.  She is also on her preadmission medications Oxycontin 20 mg at HS and pregabalin 100 mg TID.  I will ask her to increase her home oxycodone/acetaminophen dose to 10 mg at a time, and advised her that she can use it up to every 6 hours as needed.  She will continue OxyContin and pregabalin unchanged.  -Hold ASA in anticipation of surgery.       Fibromyalgia/chronic pain   Underlying chronic pain and chronic narcotic use: preadmission regimen oxycodone/acetaminophen 5/325 mg PO Q8H PRN, Oxycontin 20 mg at bedtime, pregabalin 100 mg TID, and tizanidine 4 mg at bedtime.  Aside from increased right ankle pain, remaining pain complaints are at  baseline.     -Continue home regimen above, though oxycodone dose and frequency increased as above.       Hypokalemia  Has a history of extremely severe hypokalemia in the past.  Admission K 3.2 --> 3.6 today after PO replacement.    -KCl 30 mEQ every day at discharge.  This is an increase from preadmission dose of 20 mEq  QD.       Benign essential hypertension  BP control here has been good.  -Continue home amlodpine.        Depression  Mood and affect stable.    -Continue preadmission imipramine and pregabalin.       Hypothyroidism (acquired)  Clinically euthyroid, TSH normal 2 months ago.  -Continue preadmission levothyroxine.       Migraine headache  Asymptomatic recently as well as since admission.      Tobacco abuse  Does not want nicotine supplements or assistance with quitting.  Advised that quitting would help with wound healing.  Has been going outside to smoke.     Pending Results   Unresulted Labs Ordered in the Past 30 Days of this Admission     Date and Time Order Name Status Description    7/26/2019 2125 Blood culture Preliminary     7/26/2019 2125 Blood culture Preliminary           Physical Exam   Temp:  [97.9  F (36.6  C)-98.5  F (36.9  C)] 98.1  F (36.7  C)  Pulse:  [68-82] 82  Heart Rate:  [70-73] 73  Resp:  [16-18] 18  BP: (119-134)/(51-63) 129/55  SpO2:  [91 %-94 %] 94 %  Vitals:    07/26/19 1732 07/26/19 2259   Weight: 86 kg (189 lb 9.5 oz) 87.3 kg (192 lb 7.4 oz)       GENERAL: Pleasant woman, sitting up in bed, looks comfortable.  EYES: Eyes grossly normal to inspection, extraocular movements intact  HENT: Nares patent bilaterally.  Nasal mucosa normal, no discharge.    NECK: Trachea midline, no stridor.    RESP: No accessory muscle use.  Symmetrically distant breath sounds to a moderate degree.  Lungs clear throughout on inspiration.  Expiration mildly to moderately prolonged, no wheeze.  CV: Regular rate and rhythm, non-tachycardic.  Normal S1 S2, no murmur or extra sound.  No lower  extremity edema.  ABDOMEN: Soft, non-tender, no guarding.  Liver and spleen not palpable, no masses palpable.  Bowel sounds positive.  MS: There are curvilinear, healed incisions on both the medial and lateral malleolus of the right ankle.  The right ankle is mildly swollen as compared to the left, mildly warm, but not erythematous.  I cannot palpate any fluctuance in the joint or in the foot dorsum.  SKIN: Warm and dry, no rashes.  NEURO: Alert, oriented, conversant.  Cranial nerves III - XII grossly intact.  No gross motor or sensory deficits.    PSYCH: Calm, alert, conversant.  Able to articulate logical thoughts, no tangential thoughts, no hallucinations or delusions.  Affect normal.    The discharge plan was discussed with the patient, who expresses agreement.    Total time on this discharge was 40 minutes.    Sean Combs MD

## 2019-07-29 NOTE — CONSULTS
Care Transitions:    The patient may require outpatient IV antibiotics upon discharge.  Referral placed for [facility] [phone].  Per [facility] the patient has the following health insurance benefits through [employer]:  $2000 deductible, which has been met; $3500 out of pocket max, which has been met; the patient will have 100% coverage for home infusion therapy.    Discussed the possibility of outpatient IV therapy and benefits with the patient.  The patient would like to receive home IV therapy upon discharge if necessary.    Care Transitions will continue to monitor and assist with discharge planning.      Kristin Paz RN, Care Coordinator [phone]

## 2019-07-29 NOTE — PLAN OF CARE
AOx4, up independent in room, does leave floor to go outside to smoke. PRN dilaudid x 1, VSS, rested comfortably throughout shift.

## 2019-07-30 ENCOUNTER — TELEPHONE (OUTPATIENT)
Dept: FAMILY MEDICINE | Facility: CLINIC | Age: 59
End: 2019-07-30

## 2019-07-30 ENCOUNTER — HOSPITAL ENCOUNTER (OUTPATIENT)
Facility: CLINIC | Age: 59
End: 2019-07-30
Attending: PODIATRIST | Admitting: PODIATRIST
Payer: COMMERCIAL

## 2019-07-30 DIAGNOSIS — Z12.39 SCREENING FOR BREAST CANCER: Primary | ICD-10-CM

## 2019-07-30 PROBLEM — M86.271: Status: ACTIVE | Noted: 2019-07-26

## 2019-07-30 NOTE — LETTER
[facility]  [address]  [facility] [address]  Phone: [phone]      7/30/2019     Britni Tavarez  [address] [address]      Dear Britni:    Here at [facility], we care about your health and have reviewed your chart in order to best serve your health care needs. Based on this review, it is recommended that you complete the following:    You are due for a screening MAMMOGRAM. Please contact the mammogram scheduling desk at [phone]. Recommended every 1-2 years for women age 50 and older. Mammograms help detect breast cancer, which is the most common cancer among women in the [place]. You may need to start having mammograms earlier and more often if you have had breast cancer, breast problems, or a family history of breast cancer.     If you have any additional questions or concerns, or if you have had testing done elsewhere, please notify your clinic. Thank you for trusting [facility] and we appreciate the opportunity to serve you. We look forward to supporting your healthcare needs soon.    If you have a My-Chart Account, you also can schedule this appointment through there.      Sincerely,      Ella GARCIA CNP/Sarah Barnes MA

## 2019-07-30 NOTE — TELEPHONE ENCOUNTER
Panel Management Review      Patient has the following on her problem list:   Patient Active Problem List   Diagnosis     SNHL (sensorineural hearing loss)     Tinnitus, bilateral     Benign essential hypertension     Hypercholesteremia     Fibromyalgia     History of tympanoplasty of right ear     Depression     Hypothyroidism (acquired)     Midline low back pain without sciatica     Migraine headache     Pain medication agreement     Spinal enthesopathy (H)     Osteopenia     Recurrent sinus infections     Hypokalemia     Osteomyelitis (H)     Chronic pain syndrome       Composite cancer screening  Chart review shows that this patient is due/due soon for the following   Health Maintenance   Topic Date Due     PREVENTIVE CARE VISIT  1960     URINE DRUG SCREEN  1960     HEPATITIS C SCREENING  1961     ADVANCE CARE PLANNING  1961     DEPRESSION ACTION PLAN  1961     MAMMO SCREENING  1960     PAP  1960     COLONOSCOPY  06/01/1971     HIV SCREENING  06/01/1976     DTAP/TDAP/TD IMMUNIZATION (1 - Tdap) 06/01/1986     LIPID  06/01/2006     ZOSTER IMMUNIZATION (2 of 3) 09/01/2016     INFLUENZA VACCINE (1) 09/01/2019     PHQ-9  12/06/2019     IPV IMMUNIZATION  Aged Out     MENINGITIS IMMUNIZATION  Aged Out     Summary:    Patient is due/failing the following:   MAMMOGRAM and pap -noted in chart pt had hysterectomy.    Action needed:   Patient needs referral/order: mammogram    Type of outreach:    Sent letter. mammogram ordered    Questions for provider review:    None                                                                                                                                    Sarah Barnes MA     Chart routed to none .

## 2019-08-02 LAB
BACTERIA SPEC CULT: NO GROWTH
BACTERIA SPEC CULT: NO GROWTH
Lab: NORMAL
Lab: NORMAL
SPECIMEN SOURCE: NORMAL
SPECIMEN SOURCE: NORMAL

## 2019-09-09 ENCOUNTER — OFFICE VISIT (OUTPATIENT)
Dept: FAMILY MEDICINE | Facility: CLINIC | Age: 59
End: 2019-09-09
Payer: COMMERCIAL

## 2019-09-09 VITALS
TEMPERATURE: 97.6 F | OXYGEN SATURATION: 99 % | HEART RATE: 84 BPM | SYSTOLIC BLOOD PRESSURE: 122 MMHG | WEIGHT: 183 LBS | BODY MASS INDEX: 33.68 KG/M2 | HEIGHT: 62 IN | DIASTOLIC BLOOD PRESSURE: 78 MMHG | RESPIRATION RATE: 16 BRPM

## 2019-09-09 DIAGNOSIS — I10 BENIGN ESSENTIAL HYPERTENSION: ICD-10-CM

## 2019-09-09 DIAGNOSIS — E78.00 HYPERCHOLESTEREMIA: ICD-10-CM

## 2019-09-09 DIAGNOSIS — E03.9 HYPOTHYROIDISM (ACQUIRED): ICD-10-CM

## 2019-09-09 DIAGNOSIS — F11.90 CHRONIC, CONTINUOUS USE OF OPIOIDS: ICD-10-CM

## 2019-09-09 DIAGNOSIS — Z72.0 TOBACCO ABUSE: ICD-10-CM

## 2019-09-09 DIAGNOSIS — M86.271 SUBACUTE OSTEOMYELITIS OF RIGHT ANKLE (H): ICD-10-CM

## 2019-09-09 DIAGNOSIS — Z01.818 PREOP GENERAL PHYSICAL EXAM: Primary | ICD-10-CM

## 2019-09-09 LAB
ANION GAP SERPL CALCULATED.3IONS-SCNC: 7 MMOL/L (ref 3–14)
BUN SERPL-MCNC: 9 MG/DL (ref 7–30)
CALCIUM SERPL-MCNC: 9.4 MG/DL (ref 8.5–10.1)
CHLORIDE SERPL-SCNC: 103 MMOL/L (ref 94–109)
CO2 SERPL-SCNC: 26 MMOL/L (ref 20–32)
CREAT SERPL-MCNC: 0.69 MG/DL (ref 0.52–1.04)
ERYTHROCYTE [DISTWIDTH] IN BLOOD BY AUTOMATED COUNT: 14.9 % (ref 10–15)
GFR SERPL CREATININE-BSD FRML MDRD: >90 ML/MIN/{1.73_M2}
GLUCOSE SERPL-MCNC: 145 MG/DL (ref 70–99)
HCT VFR BLD AUTO: 47.6 % (ref 35–47)
HGB BLD-MCNC: 16 G/DL (ref 11.7–15.7)
MCH RBC QN AUTO: 29.5 PG (ref 26.5–33)
MCHC RBC AUTO-ENTMCNC: 33.6 G/DL (ref 31.5–36.5)
MCV RBC AUTO: 88 FL (ref 78–100)
PLATELET # BLD AUTO: 279 10E9/L (ref 150–450)
POTASSIUM SERPL-SCNC: 3 MMOL/L (ref 3.4–5.3)
RBC # BLD AUTO: 5.43 10E12/L (ref 3.8–5.2)
SODIUM SERPL-SCNC: 136 MMOL/L (ref 133–144)
WBC # BLD AUTO: 9.6 10E9/L (ref 4–11)

## 2019-09-09 PROCEDURE — 93000 ELECTROCARDIOGRAM COMPLETE: CPT | Performed by: NURSE PRACTITIONER

## 2019-09-09 PROCEDURE — 99214 OFFICE O/P EST MOD 30 MIN: CPT | Performed by: NURSE PRACTITIONER

## 2019-09-09 PROCEDURE — 85027 COMPLETE CBC AUTOMATED: CPT | Performed by: NURSE PRACTITIONER

## 2019-09-09 PROCEDURE — 36415 COLL VENOUS BLD VENIPUNCTURE: CPT | Performed by: NURSE PRACTITIONER

## 2019-09-09 PROCEDURE — 80048 BASIC METABOLIC PNL TOTAL CA: CPT | Performed by: NURSE PRACTITIONER

## 2019-09-09 ASSESSMENT — MIFFLIN-ST. JEOR: SCORE: 1363.33

## 2019-09-09 NOTE — PROGRESS NOTES
[facility]  [address] [address]  [phone]  Dept: [phone]    PRE-OP EVALUATION:  Today's date: 2019    Britni Tavarez (: 1960) presents for pre-operative evaluation assessment as requested by Dr. Sean Navarrete.  She requires evaluation and anesthesia risk assessment prior to undergoing surgery/procedure for treatment of osteomyelitis of right ankle.    Proposed Surgery/ Procedure: irrigation and debridement right ankle, bone biopsy right ankle  Date of Surgery/ Procedure: 9/10/2019  Time of Surgery/ Procedure: [facility]/Surgical Facility: [facility]  Fax number for surgical facility: [phone]  Primary Physician: [facility] [place]  Type of Anesthesia Anticipated: to be determined    Patient has a Health Care Directive or Living Will:  YES not scanned    1. NO - Do you have a history of heart attack, stroke, stent, bypass or surgery on an artery in the head, neck, heart or legs?  2. NO - Do you ever have any pain or discomfort in your chest?  3. NO - Do you have a history of  Heart Failure?  4. NO - Are you troubled by shortness of breath when: walking on the level, up a slight hill or at night?  5. NO - Do you currently have a cold, bronchitis or other respiratory infection?  6. NO - Do you have a cough, shortness of breath or wheezing?  7. YES - DO YOU SOMETIMES GET PAINS IN THE CALVES OF YOUR LEGS WHEN YOU WALK?   8. NO - Do you or anyone in your family have previous history of blood clots?  9. NO - Do you or does anyone in your family have a serious bleeding problem such as prolonged bleeding following surgeries or cuts?  10. NO - Have you ever had problems with anemia or been told to take iron pills?  11. NO - Have you had any abnormal blood loss such as black, tarry or bloody stools, or abnormal vaginal bleeding?  12. NO - Have you ever had a blood transfusion?  13. NO - Have you or any of your relatives ever had problems  with anesthesia?  14. NO - Do you have sleep apnea, excessive snoring or daytime drowsiness?  15. NO - Do you have any prosthetic heart valves?  16. NO - Do you have prosthetic joints?  17. NO - Is there any chance that you may be pregnant?      HPI:     HPI related to upcoming procedure:   Initial surgery 6/13/2019 - had ongoing pain post op.  Osteomyelitis found on advanced imaging.      HYPERLIPIDEMIA - Patient has a long history of significant Hyperlipidemia requiring medication for treatment with recent unable to assess control. Patient reports no problems or side effects with the medication.     HYPERTENSION - Patient has longstanding history of HTN , currently denies any symptoms referable to elevated blood pressure. Specifically denies chest pain, palpitations, dyspnea, orthopnea, PND or peripheral edema. Blood pressure readings have been in normal range. Current medication regimen is as listed below. Patient denies any side effects of medication.     HYPOTHYROIDISM - Patient has a longstanding history of chronic Hypothyroidism. Patient has been doing well, noting no tremor, insomnia, hair loss or changes in skin texture. Continues to take medications as directed, without adverse reactions or side effects. Last TSH   Lab Results   Component Value Date    TSH 1.00 06/06/2019   .        MEDICAL HISTORY:     Patient Active Problem List    Diagnosis Date Noted     Tobacco abuse 09/09/2019     Priority: Medium     Chronic pain syndrome 07/29/2019     Priority: Medium     Subacute osteomyelitis of right ankle (H) associated with previous ankle hardware 07/26/2019     Priority: Medium     Hypokalemia 07/04/2019     Priority: Medium     Recurrent sinus infections 06/06/2019     Priority: Medium     SNHL (sensorineural hearing loss) 04/17/2017     Priority: Medium     Tinnitus, bilateral 04/17/2017     Priority: Medium     Benign essential hypertension 04/17/2017     Priority: Medium     Hypercholesteremia 04/17/2017      Priority: Medium     Fibromyalgia 04/17/2017     Priority: Medium     History of tympanoplasty of right ear 04/17/2017     Priority: Medium     Depression 10/13/2016     Priority: Medium     Midline low back pain without sciatica 06/09/2015     Priority: Medium     Spinal enthesopathy (H) 01/20/2015     Priority: Medium     Pain medication agreement 12/06/2011     Priority: Medium     Overview:   Signed 12/6/11 - OK for #75 Norco (Hydrocodone/Acetaminophen 5/325 mg) per month.  Referral to Pain Clinic made in July 2016, patient says she has an appointment October 2016. In 12/2016 Patient says she went to a pain clinic and they wanted her to come off her blood pressure meds and continue Norco (no record of that visit have been received).  her  checked 3/10/2017 showing compliance with her agreement.  Last urine drug screen 9/2016 shows no unexpected findings.       Hypothyroidism (acquired) 12/13/2005     Priority: Medium     Migraine headache 12/13/2005     Priority: Medium     Overview:   Was on propranolol for prevention but caused falls  Also takes imipramine.  100 mg each night, consider decreasing as HA less frequent as of 12/2016  Neurology referral as of September 2016-didn't go because HA less frequent.       Osteopenia 12/13/2005     Priority: Medium     Overview:   Last DEXA in 2012 (stable), repeat in 2014--Declined in 2014.  Normal vitamin D level x 2.        Past Medical History:   Diagnosis Date     Back pain      Cellulitis      Chronic pain      Depressive disorder      Elevated cholesterol      Fibromyalgia      Hypertension      Hypothyroid      Migraines      Osteopenia      Recurrent sinus infections      Spinal enthesopathy (H)      Past Surgical History:   Procedure Laterality Date     HERNIA REPAIR, UMBILICAL  1961     HYSTERECTOMY       REMOVE HARDWARE ANKLE Right 6/13/2019    Procedure: RIGHT ANKLE: DEEP HARDWARE REMOVAL; SUBTALAR AND MIDTARSAL JOINT DEBRIDEMENTS;  Surgeon: Fanny  Nikita Means DPM;  Location: [facility] OR     TYMPANOPLASTY       Current Outpatient Medications   Medication Sig Dispense Refill     amLODIPine (NORVASC) 10 MG tablet Take 1 tablet (10 mg) by mouth daily (Patient taking differently: Take 10 mg by mouth every morning ) 90 tablet 1     COCONUT OIL PO Take 500 mg by mouth 2 times daily       CRANBERRY PO Take 1 capsule by mouth 3 times daily        diclofenac (VOLTAREN) 1 % topical gel Place onto the skin every 4 hours As needed       glucosamine-chondroitin 500-400 MG CAPS per capsule Take 1 capsule by mouth 2 times daily        imipramine (TOFRANIL) 50 MG tablet Take 3 tablets (150 mg) by mouth At Bedtime 90 tablet 3     levothyroxine (SYNTHROID/LEVOTHROID) 112 MCG tablet Take 1 tablet (112 mcg) by mouth every evening (Patient taking differently: Take 112 mcg by mouth every morning ) 90 tablet 1     Multiple Vitamins-Minerals (MULTIVITAMIN ADULT PO) Take 1 tablet by mouth daily        oxyCODONE (OXYCONTIN) 10 MG 12 hr tablet Take 10 mg by mouth 2 times daily        oxyCODONE-acetaminophen (PERCOCET) 5-325 MG tablet Take 1-2 tablets by mouth every 6 hours as needed for moderate to severe pain (Patient taking differently: Take 1-2 tablets by mouth every 6 hours as needed for moderate to severe pain ) 30 tablet 0     potassium chloride ER (K-TAB/KLOR-CON) 10 MEQ CR tablet Take 3 tablets (30 mEq) by mouth daily 90 tablet 0     pravastatin (PRAVACHOL) 40 MG tablet Take 1 tablet (40 mg) by mouth daily (Patient taking differently: Take 40 mg by mouth At Bedtime ) 90 tablet 1     pregabalin (LYRICA) 100 MG capsule Take 100 mg by mouth 3 times daily 0700, 1 pm, 7 pm       tiZANidine (ZANAFLEX) 4 MG tablet Take 4 mg by mouth 2 times daily May repeat once if unable to sleep       Turmeric, Curcuma Longa, (TURMERIC ROOT) POWD Take 1 capsule by mouth every evening        VITAMIN D, CHOLECALCIFEROL, PO Take 2,000 Units by mouth every evening        fish oil-omega-3 fatty acids 1000 MG  "capsule Take 1 g by mouth every evening        OTC products: None, except as noted above    Allergies   Allergen Reactions     Atorvastatin GI Disturbance     Augmentin Hives     Cortisone Other (See Comments)     IM only     Flagyl [Metronidazole] Nausea     Ibuprofen Other (See Comments)     Increased bleeding     Lasix [Furosemide] Other (See Comments)     Morphine Hives     Propranolol Other (See Comments)     Low blood pressure and fainting          Simvastatin Muscle Pain (Myalgia)     Sulfa Drugs Hives     Valtrex [Valacyclovir] Hives     Penicillins Rash     Prednisone Rash      Latex Allergy: NO    Social History     Tobacco Use     Smoking status: Current Every Day Smoker     Packs/day: 1.00     Smokeless tobacco: Never Used     Tobacco comment: 10 cigarettes per day   Substance Use Topics     Alcohol use: Not Currently     Frequency: Never     History   Drug Use Unknown       REVIEW OF SYSTEMS:   Constitutional, neuro, ENT, endocrine, pulmonary, cardiac, gastrointestinal, genitourinary, musculoskeletal, integument and psychiatric systems are negative, except as otherwise noted.    EXAM:   /78 (BP Location: Right arm)   Pulse 84   Temp 97.6  F (36.4  C) (Tympanic)   Resp 16   Ht 1.575 m (5' 2\")   Wt 83 kg (183 lb)   SpO2 99%   BMI 33.47 kg/m      GENERAL APPEARANCE: healthy, alert and no distress     EYES: EOMI, PERRL     HENT: ear canals and TM's normal and nose and mouth without ulcers or lesions     NECK: no adenopathy, no asymmetry, masses, or scars and thyroid normal to palpation     RESP: lungs clear to auscultation - no rales, rhonchi or wheezes     CV: regular rates and rhythm, normal S1 S2, no S3 or S4 and no murmur, click or rub     ABDOMEN:  soft, nontender, no HSM or masses and bowel sounds normal     SKIN: no suspicious lesions or rashes     NEURO: Normal strength and tone, sensory exam grossly normal, mentation intact and speech normal     PSYCH: mentation appears normal. and " affect normal/bright     LYMPHATICS: No cervical adenopathy    DIAGNOSTICS:   EKG: appears normal, NSR, normal axis, normal intervals, no acute ST/T changes c/w ischemia, no LVH by voltage criteria      Results for orders placed or performed in visit on 09/09/19   CBC with platelets   Result Value Ref Range    WBC 9.6 4.0 - 11.0 10e9/L    RBC Count 5.43 (H) 3.8 - 5.2 10e12/L    Hemoglobin 16.0 (H) 11.7 - 15.7 g/dL    Hematocrit 47.6 (H) 35.0 - 47.0 %    MCV 88 78 - 100 fl    MCH 29.5 26.5 - 33.0 pg    MCHC 33.6 31.5 - 36.5 g/dL    RDW 14.9 10.0 - 15.0 %    Platelet Count 279 150 - 450 10e9/L   Basic metabolic panel   Result Value Ref Range    Sodium 136 133 - 144 mmol/L    Potassium 3.0 (L) 3.4 - 5.3 mmol/L    Chloride 103 94 - 109 mmol/L    Carbon Dioxide 26 20 - 32 mmol/L    Anion Gap 7 3 - 14 mmol/L    Glucose 145 (H) 70 - 99 mg/dL    Urea Nitrogen 9 7 - 30 mg/dL    Creatinine 0.69 0.52 - 1.04 mg/dL    GFR Estimate >90 >60 mL/min/[1.73_m2]    GFR Estimate If Black >90 >60 mL/min/[1.73_m2]    Calcium 9.4 8.5 - 10.1 mg/dL       Recent Labs   Lab Test 07/29/19  0512 07/27/19  0538   HGB 12.4 14.0    270    143   POTASSIUM 3.6 3.7   CR 0.62 0.58     IMPRESSION:   Reason for surgery/procedure: osteomyelitis  Diagnosis/reason for consult: pre-op evaluation    The proposed surgical procedure is considered INTERMEDIATE risk.    REVISED CARDIAC RISK INDEX  The patient has the following serious cardiovascular risks for perioperative complications such as (MI, PE, VFib and 3  AV Block):  No serious cardiac risks  INTERPRETATION: 0 risks: Class I (very low risk - 0.4% complication rate)    The patient has the following additional risks for perioperative complications:  High tolerance to opioid analgesics due to chronic use,  HTN, hyperlipidemia, hypothyroidism      ICD-10-CM    1. Preop general physical exam Z01.818    2. Subacute osteomyelitis of right ankle (H) associated with previous ankle hardware M86.271     3. Hypercholesteremia E78.00    4. Hypothyroidism (acquired) E03.9    5. Benign essential hypertension I10    6. Chronic, continuous use of opioids F11.90    7. Tobacco abuse Z72.0        RECOMMENDATIONS:       Cardiovascular Risk  Performs 4 METs exercise without symptoms (Light housework (dusting, washing dishes), Climb a flight of stairs, Walk on level ground at 15 minutes per mile (4 miles/hour) and Bicycling at 8.5 minutes per mile (7 miles/hour)) .       --Patient is to take all scheduled medications on the day of surgery EXCEPT for modifications listed below.    Anticoagulant or Antiplatelet Medication Use  Hold diclofenac gel  Hold all NSAIDs  Hold fish oil        APPROVAL GIVEN to proceed with proposed procedure, without further diagnostic evaluation       Signed Electronically by: JOSE Richter CNP    Copy of this evaluation report is provided to requesting physician.    [facility] Preop Guidelines    Revised Cardiac Risk Index

## 2019-09-11 ENCOUNTER — HOME INFUSION (PRE-WILLOW HOME INFUSION) (OUTPATIENT)
Dept: PHARMACY | Facility: CLINIC | Age: 59
End: 2019-09-11

## 2019-09-12 ENCOUNTER — HOME INFUSION (PRE-WILLOW HOME INFUSION) (OUTPATIENT)
Dept: PHARMACY | Facility: CLINIC | Age: 59
End: 2019-09-12

## 2019-09-12 NOTE — PROGRESS NOTES
This is a recent snapshot of the patient's [facility] medical record.  For current drug dose and complete information and questions, call [phone] or In Basket pool, fv home infusion ([id number])  CSN Number:  [id number]

## 2019-09-13 ENCOUNTER — HOME INFUSION (PRE-WILLOW HOME INFUSION) (OUTPATIENT)
Dept: PHARMACY | Facility: CLINIC | Age: 59
End: 2019-09-13

## 2019-09-13 NOTE — PROGRESS NOTES
This is a recent snapshot of the patient's [facility] medical record.  For current drug dose and complete information and questions, call [phone] or In Basket pool, fv home infusion ([id number])  CSN Number:  [id number]

## 2019-09-16 ENCOUNTER — HOME INFUSION (PRE-WILLOW HOME INFUSION) (OUTPATIENT)
Dept: PHARMACY | Facility: CLINIC | Age: 59
End: 2019-09-16

## 2019-09-16 NOTE — PROGRESS NOTES
This is a recent snapshot of the patient's [facility] medical record.  For current drug dose and complete information and questions, call [phone] or In Basket pool, fv home infusion ([id number])  CSN Number:  [id number]

## 2019-09-17 ENCOUNTER — HOME INFUSION (PRE-WILLOW HOME INFUSION) (OUTPATIENT)
Dept: PHARMACY | Facility: CLINIC | Age: 59
End: 2019-09-17

## 2019-09-17 NOTE — PROGRESS NOTES
This is a recent snapshot of the patient's [facility] medical record.  For current drug dose and complete information and questions, call [phone] or In [facility] pool, fv home infusion ([id number])  CSN Number:  [id number]

## 2019-09-18 ENCOUNTER — HOME INFUSION (PRE-WILLOW HOME INFUSION) (OUTPATIENT)
Dept: PHARMACY | Facility: CLINIC | Age: 59
End: 2019-09-18

## 2019-09-18 NOTE — PROGRESS NOTES
This is a recent snapshot of the patient's [facility] medical record.  For current drug dose and complete information and questions, call [phone] or In Basket pool, fv home infusion ([id number])  CSN Number:  [id number]

## 2019-09-19 ENCOUNTER — HOME INFUSION (PRE-WILLOW HOME INFUSION) (OUTPATIENT)
Dept: PHARMACY | Facility: CLINIC | Age: 59
End: 2019-09-19

## 2019-09-19 NOTE — PROGRESS NOTES
This is a recent snapshot of the patient's [facility] medical record.  For current drug dose and complete information and questions, call [phone] or In Basket pool, fv home infusion ([id number])  CSN Number:  [id number]

## 2019-09-20 ENCOUNTER — HOME INFUSION (PRE-WILLOW HOME INFUSION) (OUTPATIENT)
Dept: PHARMACY | Facility: CLINIC | Age: 59
End: 2019-09-20

## 2019-09-20 NOTE — PROGRESS NOTES
This is a recent snapshot of the patient's [facility] medical record.  For current drug dose and complete information and questions, call [phone] or In Basket pool, fv home infusion ([id number])  CSN Number:  [id number]

## 2019-09-23 ENCOUNTER — HOME INFUSION (PRE-WILLOW HOME INFUSION) (OUTPATIENT)
Dept: PHARMACY | Facility: CLINIC | Age: 59
End: 2019-09-23

## 2019-09-23 NOTE — PROGRESS NOTES
This is a recent snapshot of the patient's [facility] medical record.  For current drug dose and complete information and questions, call [phone] or In Basket pool, fv home infusion ([id number])  CSN Number:  [id number]

## 2019-09-24 NOTE — PROGRESS NOTES
This is a recent snapshot of the patient's [facility] medical record.  For current drug dose and complete information and questions, call [phone] or In Basket pool, fv home infusion ([id number])  CSN Number:  [id number]

## 2019-09-26 ENCOUNTER — HOME INFUSION (PRE-WILLOW HOME INFUSION) (OUTPATIENT)
Dept: PHARMACY | Facility: CLINIC | Age: 59
End: 2019-09-26

## 2019-09-27 ENCOUNTER — HOME INFUSION (PRE-WILLOW HOME INFUSION) (OUTPATIENT)
Dept: PHARMACY | Facility: CLINIC | Age: 59
End: 2019-09-27

## 2019-09-30 ENCOUNTER — HOME INFUSION (PRE-WILLOW HOME INFUSION) (OUTPATIENT)
Dept: PHARMACY | Facility: CLINIC | Age: 59
End: 2019-09-30

## 2019-09-30 NOTE — PROGRESS NOTES
This is a recent snapshot of the patient's [facility] medical record.  For current drug dose and complete information and questions, call [phone] or In Basket pool, fv home infusion ([id number])  CSN Number:  [id number]

## 2019-10-01 NOTE — PROGRESS NOTES
This is a recent snapshot of the patient's [facility] medical record.  For current drug dose and complete information and questions, call [phone] or In Basket pool, fv home infusion ([id number])  CSN Number:  [id number]

## 2019-10-03 ENCOUNTER — HOME INFUSION (PRE-WILLOW HOME INFUSION) (OUTPATIENT)
Dept: PHARMACY | Facility: CLINIC | Age: 59
End: 2019-10-03

## 2019-10-04 ENCOUNTER — HOME INFUSION (PRE-WILLOW HOME INFUSION) (OUTPATIENT)
Dept: PHARMACY | Facility: CLINIC | Age: 59
End: 2019-10-04

## 2019-10-04 NOTE — PROGRESS NOTES
This is a recent snapshot of the patient's [facility] medical record.  For current drug dose and complete information and questions, call [phone] or In Basket pool, fv home infusion ([id number])  CSN Number:  [id number]

## 2019-10-07 ENCOUNTER — HOME INFUSION (PRE-WILLOW HOME INFUSION) (OUTPATIENT)
Dept: PHARMACY | Facility: CLINIC | Age: 59
End: 2019-10-07

## 2019-10-07 NOTE — PROGRESS NOTES
This is a recent snapshot of the patient's [facility] medical record.  For current drug dose and complete information and questions, call [phone] or In Basket pool, fv home infusion ([id number])  CSN Number:  [id number]

## 2019-10-08 ENCOUNTER — HOME INFUSION (PRE-WILLOW HOME INFUSION) (OUTPATIENT)
Dept: PHARMACY | Facility: CLINIC | Age: 59
End: 2019-10-08

## 2019-10-08 NOTE — PROGRESS NOTES
This is a recent snapshot of the patient's [facility] medical record.  For current drug dose and complete information and questions, call [phone] or In Basket pool, fv home infusion ([id number])  CSN Number:  [id number]

## 2019-10-09 NOTE — PROGRESS NOTES
This is a recent snapshot of the patient's [facility] medical record.  For current drug dose and complete information and questions, call [phone] or In Basket pool, fv home infusion ([id number])  CSN Number:  [id number]

## 2019-10-10 ENCOUNTER — HOME INFUSION (PRE-WILLOW HOME INFUSION) (OUTPATIENT)
Dept: PHARMACY | Facility: CLINIC | Age: 59
End: 2019-10-10

## 2019-10-11 ENCOUNTER — HOME INFUSION (PRE-WILLOW HOME INFUSION) (OUTPATIENT)
Dept: PHARMACY | Facility: CLINIC | Age: 59
End: 2019-10-11

## 2019-10-11 NOTE — PROGRESS NOTES
This is a recent snapshot of the patient's [facility] medical record.  For current drug dose and complete information and questions, call [phone] or In Basket pool, fv home infusion ([id number])  CSN Number:  [id number]

## 2019-10-14 ENCOUNTER — HOME INFUSION (PRE-WILLOW HOME INFUSION) (OUTPATIENT)
Dept: PHARMACY | Facility: CLINIC | Age: 59
End: 2019-10-14

## 2019-10-14 NOTE — PROGRESS NOTES
This is a recent snapshot of the patient's [facility] medical record.  For current drug dose and complete information and questions, call [phone] or In Basket pool, fv home infusion ([id number])  CSN Number:  [id number]

## 2019-10-15 ENCOUNTER — HOME INFUSION (PRE-WILLOW HOME INFUSION) (OUTPATIENT)
Dept: PHARMACY | Facility: CLINIC | Age: 59
End: 2019-10-15

## 2019-10-15 NOTE — PROGRESS NOTES
This is a recent snapshot of the patient's [facility] medical record.  For current drug dose and complete information and questions, call [phone] or In [facility] pool, fv home infusion ([id number])  CSN Number:  [id number]

## 2019-10-16 NOTE — PROGRESS NOTES
This is a recent snapshot of the patient's [facility] medical record.  For current drug dose and complete information and questions, call [phone] or In Basket pool, fv home infusion ([id number])  CSN Number:  [id number]

## 2019-10-17 ENCOUNTER — HOME INFUSION (PRE-WILLOW HOME INFUSION) (OUTPATIENT)
Dept: PHARMACY | Facility: CLINIC | Age: 59
End: 2019-10-17

## 2019-10-18 ENCOUNTER — HOME INFUSION (PRE-WILLOW HOME INFUSION) (OUTPATIENT)
Dept: PHARMACY | Facility: CLINIC | Age: 59
End: 2019-10-18

## 2019-10-18 NOTE — PROGRESS NOTES
This is a recent snapshot of the patient's [facility] medical record.  For current drug dose and complete information and questions, call [phone] or In Basket pool, fv home infusion ([id number])  CSN Number:  [id number]

## 2019-10-21 ENCOUNTER — HOME INFUSION (PRE-WILLOW HOME INFUSION) (OUTPATIENT)
Dept: PHARMACY | Facility: CLINIC | Age: 59
End: 2019-10-21

## 2019-10-21 NOTE — PROGRESS NOTES
This is a recent snapshot of the patient's [facility] medical record.  For current drug dose and complete information and questions, call [phone] or In Basket pool, fv home infusion ([id number])  CSN Number:  [id number]

## 2019-10-22 NOTE — PROGRESS NOTES
This is a recent snapshot of the patient's [facility] medical record.  For current drug dose and complete information and questions, call [phone] or In Basket pool, fv home infusion ([id number])  CSN Number:  [id number]

## 2019-10-23 ENCOUNTER — HOME INFUSION (PRE-WILLOW HOME INFUSION) (OUTPATIENT)
Dept: PHARMACY | Facility: CLINIC | Age: 59
End: 2019-10-23

## 2019-10-24 NOTE — PROGRESS NOTES
This is a recent snapshot of the patient's [facility] medical record.  For current drug dose and complete information and questions, call [phone] or In Basket pool, fv home infusion ([id number])  CSN Number:  [id number]

## 2019-10-25 ENCOUNTER — HOME INFUSION (PRE-WILLOW HOME INFUSION) (OUTPATIENT)
Dept: PHARMACY | Facility: CLINIC | Age: 59
End: 2019-10-25

## 2019-10-28 NOTE — PROGRESS NOTES
This is a recent snapshot of the patient's [facility] medical record.  For current drug dose and complete information and questions, call [phone] or In Basket pool, fv home infusion ([id number])  CSN Number:  [id number]

## 2020-02-13 ENCOUNTER — TELEPHONE (OUTPATIENT)
Dept: FAMILY MEDICINE | Facility: CLINIC | Age: 60
End: 2020-02-13

## 2020-02-13 DIAGNOSIS — E03.9 HYPOTHYROIDISM (ACQUIRED): ICD-10-CM

## 2020-02-13 DIAGNOSIS — I10 BENIGN ESSENTIAL HYPERTENSION: ICD-10-CM

## 2020-02-13 NOTE — TELEPHONE ENCOUNTER
Panel Management Review    Composite cancer screening  Chart review shows that this patient is due/due soon for the following Pap Smear, Mammogram, Colonoscopy and Fecal Colorectal (FIT)  Summary:    Patient is due/failing the following:   COLONOSCOPY, FIT, MAMMOGRAM and PAP    Action needed:   Patient needs referral/order: fit test vs colonoscopy     Type of outreach:    Sent letter.    Questions for provider review:    None                                                                                                                                    Minerva Tadeo CMA

## 2020-02-13 NOTE — TELEPHONE ENCOUNTER
"Requested Prescriptions   Pending Prescriptions Disp Refills     EUTHYROX 112 MCG tablet [Pharmacy Med Name: Euthyrox 112 MCG Oral Tablet]  0     Sig: TAKE 1 TABLET BY MOUTH ONCE DAILY IN THE EVENING       Thyroid Protocol Passed - 2/13/2020  6:53 AM        Passed - Patient is 12 years or older        Passed - Recent (12 mo) or future (30 days) visit within the authorizing provider's specialty     Patient has had an office visit with the authorizing provider or a provider within the authorizing providers department within the previous 12 mos or has a future within next 30 days. See \"Patient Info\" tab in inbasket, or \"Choose Columns\" in Meds & Orders section of the refill encounter.              Passed - Medication is active on med list        Passed - Normal TSH on file in past 12 months     Recent Labs   Lab Test 06/06/19  0816   TSH 1.00              Passed - No active pregnancy on record     If patient is pregnant or has had a positive pregnancy test, please check TSH.          Passed - No positive pregnancy test in past 12 months     If patient is pregnant or has had a positive pregnancy test, please check TSH.     Last Written Prescription Date:  7/3/19  Last Fill Quantity: 90,  # refills: 1   Last office visit: 9/9/2019 with prescribing provider:  Vicente   Future Office Visit:         pravastatin (PRAVACHOL) 40 MG tablet [Pharmacy Med Name: Pravastatin Sodium 40 MG Oral Tablet]  0     Sig: TAKE 1 TABLET BY MOUTH ONCE DAILY       Statins Protocol Failed - 2/13/2020  6:53 AM        Failed - LDL on file in past 12 months     No lab results found.          Passed - No abnormal creatine kinase in past 12 months     No lab results found.             Passed - Recent (12 mo) or future (30 days) visit within the authorizing provider's specialty     Patient has had an office visit with the authorizing provider or a provider within the authorizing providers department within the previous 12 mos or has a future within " "next 30 days. See \"Patient Info\" tab in inbasket, or \"Choose Columns\" in Meds & Orders section of the refill encounter.              Passed - Medication is active on med list        Passed - Patient is age 18 or older        Passed - No active pregnancy on record        Passed - No positive pregnancy test in past 12 months   Last Written Prescription Date:  7/3/19  Last Fill Quantity: 90,  # refills: 1   Last office visit: 9/9/2019 with prescribing provider:  Formogey   Future Office Visit:         amLODIPine (NORVASC) 10 MG tablet [Pharmacy Med Name: amLODIPine Besylate 10 MG Oral Tablet]  0     Sig: TAKE 1 TABLET BY MOUTH ONCE DAILY       Calcium Channel Blockers Protocol  Passed - 2/13/2020  6:53 AM        Passed - Blood pressure under 140/90 in past 12 months     BP Readings from Last 3 Encounters:   09/09/19 122/78   07/29/19 129/55   07/08/19 130/72                 Passed - Recent (12 mo) or future (30 days) visit within the authorizing provider's specialty     Patient has had an office visit with the authorizing provider or a provider within the authorizing providers department within the previous 12 mos or has a future within next 30 days. See \"Patient Info\" tab in inbasket, or \"Choose Columns\" in Meds & Orders section of the refill encounter.              Passed - Medication is active on med list        Passed - Patient is age 18 or older        Passed - No active pregnancy on record        Passed - Normal serum creatinine on file in past 12 months     Recent Labs   Lab Test 09/09/19  1103   CR 0.69             Passed - No positive pregnancy test in past 12 months        Last Written Prescription Date:  7/3/19  Last Fill Quantity: 90,  # refills: 1   Last office visit: 9/9/2019 with prescribing provider:  Formogey   Future Office Visit:      "

## 2020-02-13 NOTE — LETTER
February 13, 2020      Britni Tavarez  [address] [facility] [address]          Dear Britni Tavarez, [id number]    At [facility] we care about your health and are committed to providing quality patient care, which includes staying current on preventative cancer screenings.  You can increase your chances of finding and treating cancers through regular screenings.      Our records show that you are due for the following screening(s):      Colonoscopy for colon cancer - Call [phone] to schedule   Recommended every ten years for everyone age 50 and older  Please take a moment to read over the enclosed information packet about colon cancer screening.   We strongly urge our patient's to consider having a colonoscopy done, which is the best screening test available and only needs to be done every 10 years if normal.      Mammogram for breast cancer - [phone] to schedule  Recommended every 1-2 years for women age 50 and older  Mammograms help detect breast cancer, which is the most common cancer among women in the [place].  You may need to start having mammograms earlier and more often if you have had breast cancer, breast problems, or a family history of breast cancer.     Pap Smear for cervical cancer - [phone] to schedule  Recommended every three years for women 21 and older  A Pap test is used to detect cervical cancer.  The test should be taken at least once every three years but women who are at a greater risk for cervical cancer may need to have the test more often.      You are at a greater risk for cervical cancer if:   - You have had a sexually transmitted disease   - You have had more than one sex partner   - You have had an abnormal pap test in the past    If you have a My-Chart Account, you also can schedule this appointment through there.    If you have already had one or all of the above screening tests at another facility, please call us so that we may  update your chart.      Your partners in health,      Quality Committee   [facility]

## 2020-02-14 RX ORDER — AMLODIPINE BESYLATE 10 MG/1
TABLET ORAL
Qty: 30 TABLET | Refills: 0 | Status: SHIPPED | OUTPATIENT
Start: 2020-02-14

## 2020-02-14 RX ORDER — LEVOTHYROXINE SODIUM 112 UG/1
TABLET ORAL
Qty: 30 TABLET | Refills: 0 | Status: SHIPPED | OUTPATIENT
Start: 2020-02-14

## 2020-02-14 RX ORDER — PRAVASTATIN SODIUM 40 MG
TABLET ORAL
Qty: 30 TABLET | Refills: 0 | Status: SHIPPED | OUTPATIENT
Start: 2020-02-14

## 2020-02-14 NOTE — TELEPHONE ENCOUNTER
Medications (amlodipine and euthyrox) are being filled for 1 time refill only due to:  Patient needs to be seen because :. Last OV 9/9/19 with NICHOLE GARCIA: 'Return in about 6 months (around 3/9/2020) for Physical Exam'  Medication (pravastatin) is being filled for 1 time refill only due to:  Patient needs labs Lipids.  Patient needs to be seen because :. Last OV 9/9/19 with NICHOLE GARCIA: 'Return in about 6 months (around 3/9/2020) for Physical Exam'     Ana CHILDS RN

## 2021-05-25 ENCOUNTER — RECORDS - HEALTHEAST (OUTPATIENT)
Dept: ADMINISTRATIVE | Facility: CLINIC | Age: 61
End: 2021-05-25

## 2021-05-26 ENCOUNTER — RECORDS - HEALTHEAST (OUTPATIENT)
Dept: ADMINISTRATIVE | Facility: CLINIC | Age: 61
End: 2021-05-26

## 2023-01-20 ENCOUNTER — TRANSFERRED RECORDS (OUTPATIENT)
Dept: HEALTH INFORMATION MANAGEMENT | Facility: CLINIC | Age: 63
End: 2023-01-20
Payer: COMMERCIAL

## 2023-09-27 ENCOUNTER — TRANSFERRED RECORDS (OUTPATIENT)
Dept: HEALTH INFORMATION MANAGEMENT | Facility: CLINIC | Age: 63
End: 2023-09-27
Payer: COMMERCIAL

## 2023-09-27 ENCOUNTER — TRANSCRIBE ORDERS (OUTPATIENT)
Dept: PHYSICAL MEDICINE AND REHAB | Facility: CLINIC | Age: 63
End: 2023-09-27
Payer: COMMERCIAL

## 2023-09-27 DIAGNOSIS — R29.898 RIGHT HAND WEAKNESS: ICD-10-CM

## 2023-09-27 DIAGNOSIS — M79.641 RIGHT HAND PAIN: Primary | ICD-10-CM

## 2023-09-27 DIAGNOSIS — R20.2 PARESTHESIA OF FOOT, BILATERAL: ICD-10-CM

## 2023-10-30 ENCOUNTER — OFFICE VISIT (OUTPATIENT)
Dept: PHYSICAL MEDICINE AND REHAB | Facility: CLINIC | Age: 63
End: 2023-10-30
Attending: PHYSICAL MEDICINE & REHABILITATION
Payer: COMMERCIAL

## 2023-10-30 DIAGNOSIS — R20.2 PARESTHESIA OF FOOT, BILATERAL: ICD-10-CM

## 2023-10-30 DIAGNOSIS — R29.898 RIGHT HAND WEAKNESS: ICD-10-CM

## 2023-10-30 DIAGNOSIS — M79.641 RIGHT HAND PAIN: ICD-10-CM

## 2023-10-30 PROCEDURE — 95911 NRV CNDJ TEST 9-10 STUDIES: CPT | Performed by: PHYSICAL MEDICINE & REHABILITATION

## 2023-10-30 PROCEDURE — 95886 MUSC TEST DONE W/N TEST COMP: CPT | Performed by: PHYSICAL MEDICINE & REHABILITATION

## 2023-10-30 ASSESSMENT — PAIN SCALES - GENERAL: PAINLEVEL: EXTREME PAIN (8)

## 2023-10-30 NOTE — PATIENT INSTRUCTIONS
Thank you for choosing the [facility] for your EMG testing.    The ordering provider will receive your final EMG results within the next few days.  Please follow up with your provider for the results and further treatment recommendations.

## 2023-10-30 NOTE — LETTER
10/30/2023         RE: Britni Tavarez  [address]  [facility] [address]        Dear Colleague,    Thank you for referring your patient, Britni Tavarez, to the [facility]. Please see a copy of my visit note below.    [facility]  [address] [place] [address] [address]  [place] [address]  Office: [phone] Fax: [phone]    Electromyography and Nerve Conduction Study Report        Indication: Patient presents at the request of Dr. Rubina Tong for a upper and lower extremity EMG.  She has right upper extremity numbness and tingling digit 3 with a sense of weakness in the hand hand locks up.  She has wrist pain.  She is right-handed.  She also has bilateral foot numbness and tingling in the toes to the mid tibias right is typically worse than left.  Sense of weakness.  On exam, she has normal sensation to light touch to the upper and lower extremities bilaterally, 1+ patellar 0 Achilles reflexes bilaterally normal reflexes through the upper extremities negative Sejal's, normal muscle strength throughout the major muscle groups of the bilateral upper and lower extremities.      Pt Exam Discussion (Communication Barriers):  Electromyography and nerve conduction testing, including associated discomfort, risks, benefits, and alternatives was discussed with the patient prior to the procedure.  No learning/ communication barriers; patient verbalized understanding of procedure.  Informed consent was obtained.           Pt Assessment:  Testing was successfully completed; patient tolerated testing well.       Blood Thinners: None Skin Temperature: Warmed 32.7 right lower extremity                   EMG/NCS  results:     Nerve Conduction Studies  Motor Sites      Segment Distal Latency Neg. Amp CV F-Latency F-Estimate Comment   Site  (ms) mV m/s ms ms    Right Fibular (EDB) Motor   Ankle Ankle-EDB 5.1 2.7       Knee Knee-Ankle  12.5 2.3 47      Right Median (APB) Motor   Wrist Wrist-APB 4.0 5.6       Elbow Elbow-Wrist 8.2 5.0 50      Left Tibial (AH) Motor   Ankle Ankle-AH 2.6 9.6       Knee Knee-Ankle 10.7 5.9 49      Right Tibial (AH) Motor   Ankle Ankle-AH 2.6 10.3       Knee Knee-Ankle 10.6 4.2 49      Right Ulnar (ADM) Motor   Wrist  2.4 14.2       Bel Elbow Bel Elbow-Wrist 5.6 13.9 56      Ab Elbow Ab Elbow-Wrist 7.2 13.6 57        Sensory Sites      Onset Lat Peak Lat Amp CV Comment   Site (ms) (ms)  V m/s    Right Median Anti Sensory   Wrist-Dig II 3.1 *4.0 *10 42    Right Median-Ulnar Palmar Sensory        Median   Palm-Wrist 2.0 *2.6 34 40         Ulnar   Palm-Wrist 0.90 1.55 *15 89    Left Sural Sensory   B-Ankle 3.3 3.9 4 42    Right Sural Sensory   B-Ankle 3.0 3.7 5 47    Right Ulnar Anti Sensory   Wrist-Dig V 2.0 2.7 15 55        NCS Waveforms:    Motor                  Sensory                    Electromyography     Side Muscle Nerve Root Ins Act Fibs Psw Fasc Recrt Dur Amp Poly Comment   Right Deltoid Axillary C5-6 Nml Nml Nml Nml Nml Nml Nml 0    Right Triceps Radial C6-7-8 Nml Nml Nml Nml Nml Nml Nml 0    Right PronatorTeres Median C6-7 Nml Nml Nml Nml Nml Nml Nml 0    Right 1stDorInt Ulnar C8-T1 Nml Nml Nml Nml Nml Nml Nml 0    Right Abd Poll Brev Median C8-T1 Nml Nml Nml Nml Nml Nml Nml 0    Right AntTibialis Dp Br Fibular L4-5 Nml Nml Nml Nml Nml Nml Nml 0    Right Gastroc Tibial S1-2 Nml Nml Nml Nml Nml Nml Nml 0    Right Fibularis Long Sup Br Fibular L5-S1 Nml Nml Nml Nml Nml Nml Nml 0    Right VastusLat Femoral L2-4 Nml Nml Nml Nml Nml Nml Nml 0    Right RectFemoris Femoral L2-4 Nml Nml Nml Nml Nml Nml Nml 0          Comment NCS: Abnormal study:    1.  Prolonged latency with reduced amplitude right median SNAP and transcarpal study..  2.  Normal right ulnar SNAP, ulnar transcarpal study, median and ulnar CMAP.  3.  Borderline/mildly reduced amplitude bilateral sural SNAPs.  4.  Normal right peroneal and tibial  CMAP's and left tibial CMAP.       Comment EMG: Normal study.  1.  Normal needle EMG right upper extremity.  2.  Normal needle EMG right lower extremity.  3.  Given normal right upper and lower extremity needle EMG left lower extremity was deferred at this time.    Interpretation: Abnormal study: There is electrodiagnostic evidence of:    1.  Right median neuropathy at the wrist consistent with entrapment in the carpal tunnel, mild in severity.    2.  Borderline/mildly reduced amplitude bilateral sural SNAPs.  This finding can be normal for the patient's age or may represent a very mild sensory or sensorimotor peripheral polyneuropathy.      3.  There is no electrodiagnostic evidence of cervical radiculopathy, brachial plexopathy, or ulnar neuropathy in the right upper extremity.    4.  There is no electrodiagnostic evidence of lumbosacral radiculopathy, lumbosacral plexopathy, or focal neuropathy in the right lower extremity.    The testing was completed in its entirety by the physician.      It was our pleasure caring for your patient today, if there any questions or concerns please do not hesitate to contact us.    Again, thank you for allowing me to participate in the care of your patient.        Sincerely,        Ken Patterson DO

## 2023-10-30 NOTE — PROGRESS NOTES
[facility]  [address] [place] [address] [address]  [place] [address]  Office: [phone] Fax: [phone]    Electromyography and Nerve Conduction Study Report        Indication: Patient presents at the request of Dr. Rubina Tong for a upper and lower extremity EMG.  She has right upper extremity numbness and tingling digit 3 with a sense of weakness in the hand hand locks up.  She has wrist pain.  She is right-handed.  She also has bilateral foot numbness and tingling in the toes to the mid tibias right is typically worse than left.  Sense of weakness.  On exam, she has normal sensation to light touch to the upper and lower extremities bilaterally, 1+ patellar 0 Achilles reflexes bilaterally normal reflexes through the upper extremities negative Sejal's, normal muscle strength throughout the major muscle groups of the bilateral upper and lower extremities.      Pt Exam Discussion (Communication Barriers):  Electromyography and nerve conduction testing, including associated discomfort, risks, benefits, and alternatives was discussed with the patient prior to the procedure.  No learning/ communication barriers; patient verbalized understanding of procedure.  Informed consent was obtained.           Pt Assessment:  Testing was successfully completed; patient tolerated testing well.       Blood Thinners: None Skin Temperature: Warmed 32.7 right lower extremity                   EMG/NCS  results:     Nerve Conduction Studies  Motor Sites      Segment Distal Latency Neg. Amp CV F-Latency F-Estimate Comment   Site  (ms) mV m/s ms ms    Right Fibular (EDB) Motor   Ankle Ankle-EDB 5.1 2.7       Knee Knee-Ankle 12.5 2.3 47      Right Median (APB) Motor   Wrist Wrist-APB 4.0 5.6       Elbow Elbow-Wrist 8.2 5.0 50      Left Tibial (AH) Motor   Ankle Ankle-AH 2.6 9.6       Knee Knee-Ankle 10.7 5.9 49      Right Tibial (AH) Motor   Ankle Ankle-AH 2.6 10.3       Knee Knee-Ankle 10.6 4.2  49      Right Ulnar (ADM) Motor   Wrist  2.4 14.2       Bel Elbow Bel Elbow-Wrist 5.6 13.9 56      Ab Elbow Ab Elbow-Wrist 7.2 13.6 57        Sensory Sites      Onset Lat Peak Lat Amp CV Comment   Site (ms) (ms)  V m/s    Right Median Anti Sensory   Wrist-Dig II 3.1 *4.0 *10 42    Right Median-Ulnar Palmar Sensory        Median   Palm-Wrist 2.0 *2.6 34 40         Ulnar   Palm-Wrist 0.90 1.55 *15 89    Left Sural Sensory   B-Ankle 3.3 3.9 4 42    Right Sural Sensory   B-Ankle 3.0 3.7 5 47    Right Ulnar Anti Sensory   Wrist-Dig V 2.0 2.7 15 55        NCS Waveforms:    Motor                  Sensory                    Electromyography     Side Muscle Nerve Root Ins Act Fibs Psw Fasc Recrt Dur Amp Poly Comment   Right Deltoid Axillary C5-6 Nml Nml Nml Nml Nml Nml Nml 0    Right Triceps Radial C6-7-8 Nml Nml Nml Nml Nml Nml Nml 0    Right PronatorTeres Median C6-7 Nml Nml Nml Nml Nml Nml Nml 0    Right 1stDorInt Ulnar C8-T1 Nml Nml Nml Nml Nml Nml Nml 0    Right Abd Poll Brev Median C8-T1 Nml Nml Nml Nml Nml Nml Nml 0    Right AntTibialis Dp Br Fibular L4-5 Nml Nml Nml Nml Nml Nml Nml 0    Right Gastroc Tibial S1-2 Nml Nml Nml Nml Nml Nml Nml 0    Right Fibularis Long Sup Br Fibular L5-S1 Nml Nml Nml Nml Nml Nml Nml 0    Right VastusLat Femoral L2-4 Nml Nml Nml Nml Nml Nml Nml 0    Right RectFemoris Femoral L2-4 Nml Nml Nml Nml Nml Nml Nml 0          Comment NCS: Abnormal study:    1.  Prolonged latency with reduced amplitude right median SNAP and transcarpal study..  2.  Normal right ulnar SNAP, ulnar transcarpal study, median and ulnar CMAP.  3.  Borderline/mildly reduced amplitude bilateral sural SNAPs.  4.  Normal right peroneal and tibial CMAP's and left tibial CMAP.       Comment EMG: Normal study.  1.  Normal needle EMG right upper extremity.  2.  Normal needle EMG right lower extremity.  3.  Given normal right upper and lower extremity needle EMG left lower extremity was deferred at this time.    Interpretation:  Abnormal study: There is electrodiagnostic evidence of:    1.  Right median neuropathy at the wrist consistent with entrapment in the carpal tunnel, mild in severity.    2.  Borderline/mildly reduced amplitude bilateral sural SNAPs.  This finding can be normal for the patient's age or may represent a very mild sensory or sensorimotor peripheral polyneuropathy.      3.  There is no electrodiagnostic evidence of cervical radiculopathy, brachial plexopathy, or ulnar neuropathy in the right upper extremity.    4.  There is no electrodiagnostic evidence of lumbosacral radiculopathy, lumbosacral plexopathy, or focal neuropathy in the right lower extremity.    The testing was completed in its entirety by the physician.      It was our pleasure caring for your patient today, if there any questions or concerns please do not hesitate to contact us.

## 2025-07-21 ENCOUNTER — PATIENT OUTREACH (OUTPATIENT)
Dept: CARE COORDINATION | Facility: CLINIC | Age: 65
End: 2025-07-21
Payer: COMMERCIAL

## (undated) DEVICE — NDL 22GA 1.5"

## (undated) DEVICE — SU VICRYL 2-0 SH 27" UND J417H

## (undated) DEVICE — CAST PADDING 4" STERILE 9044S

## (undated) DEVICE — BNDG ELASTIC 6" DBL LENGTH UNSTERILE 6611-16

## (undated) DEVICE — DRAPE SHEET REV FOLD 3/4 9349

## (undated) DEVICE — SOL NACL 0.9% IRRIG 1000ML BOTTLE 07138-09

## (undated) DEVICE — PACK EXTREMITY LATEX FREE SOP32HFFCS

## (undated) DEVICE — PREP CHLORAPREP 26ML TINTED ORANGE  260815

## (undated) DEVICE — DRAPE EXTREMITY W/ARMBOARD 29405

## (undated) DEVICE — GLOVE PROTEXIS POWDER FREE 8.0 ORTHOPEDIC 2D73ET80

## (undated) DEVICE — DRSG JUMPSTART ANTIMICROBIAL 4X4" ABS-4004

## (undated) DEVICE — DRAPE C-ARM 60X42" 1013

## (undated) DEVICE — GOWN LG DISP 9515

## (undated) DEVICE — DRSG GAUZE 4X4" TRAY

## (undated) DEVICE — GLOVE PROTEXIS POWDER FREE 7.5 ORTHOPEDIC 2D73ET75

## (undated) DEVICE — SOL WATER IRRIG 1000ML BOTTLE 07139-09

## (undated) DEVICE — SU ETHILON 3-0 FS-1 18" 669H

## (undated) RX ORDER — PROPOFOL 10 MG/ML
INJECTION, EMULSION INTRAVENOUS
Status: DISPENSED
Start: 2019-06-13

## (undated) RX ORDER — FENTANYL CITRATE 50 UG/ML
INJECTION, SOLUTION INTRAMUSCULAR; INTRAVENOUS
Status: DISPENSED
Start: 2019-06-13

## (undated) RX ORDER — ONDANSETRON 2 MG/ML
INJECTION INTRAMUSCULAR; INTRAVENOUS
Status: DISPENSED
Start: 2019-06-13

## (undated) RX ORDER — ROPIVACAINE HYDROCHLORIDE 5 MG/ML
INJECTION, SOLUTION EPIDURAL; INFILTRATION; PERINEURAL
Status: DISPENSED
Start: 2019-06-13

## (undated) RX ORDER — BUPIVACAINE HYDROCHLORIDE 5 MG/ML
INJECTION, SOLUTION PERINEURAL
Status: DISPENSED
Start: 2019-06-13

## (undated) RX ORDER — GABAPENTIN 300 MG/1
CAPSULE ORAL
Status: DISPENSED
Start: 2019-06-13

## (undated) RX ORDER — LIDOCAINE HYDROCHLORIDE 20 MG/ML
JELLY TOPICAL
Status: DISPENSED
Start: 2019-06-13

## (undated) RX ORDER — ACETAMINOPHEN 325 MG/1
TABLET ORAL
Status: DISPENSED
Start: 2019-06-13

## (undated) RX ORDER — LIDOCAINE HCL/EPINEPHRINE/PF 2%-1:200K
VIAL (ML) INJECTION
Status: DISPENSED
Start: 2019-06-13

## (undated) RX ORDER — DEXAMETHASONE SODIUM PHOSPHATE 4 MG/ML
INJECTION, SOLUTION INTRA-ARTICULAR; INTRALESIONAL; INTRAMUSCULAR; INTRAVENOUS; SOFT TISSUE
Status: DISPENSED
Start: 2019-06-13